# Patient Record
Sex: FEMALE | Race: WHITE | NOT HISPANIC OR LATINO | Employment: OTHER | URBAN - METROPOLITAN AREA
[De-identification: names, ages, dates, MRNs, and addresses within clinical notes are randomized per-mention and may not be internally consistent; named-entity substitution may affect disease eponyms.]

---

## 2017-01-13 ENCOUNTER — HOSPITAL ENCOUNTER (OUTPATIENT)
Dept: PHYSICAL THERAPY | Facility: HOSPITAL | Age: 67
Setting detail: RECURRING SERIES
Discharge: STILL ADMITTED OR ADMINISTRATIVE | End: 2017-01-31
Attending: ORTHOPAEDIC SURGERY | Admitting: ORTHOPAEDIC SURGERY

## 2017-02-01 ENCOUNTER — HOSPITAL ENCOUNTER (OUTPATIENT)
Dept: PHYSICAL THERAPY | Facility: HOSPITAL | Age: 67
Setting detail: RECURRING SERIES
Discharge: HOME | End: 2017-03-30
Attending: ORTHOPAEDIC SURGERY | Admitting: ORTHOPAEDIC SURGERY

## 2018-05-17 ENCOUNTER — OFFICE VISIT (OUTPATIENT)
Dept: CARDIOLOGY | Facility: CLINIC | Age: 68
End: 2018-05-17

## 2018-05-17 VITALS
HEART RATE: 77 BPM | SYSTOLIC BLOOD PRESSURE: 134 MMHG | BODY MASS INDEX: 26.12 KG/M2 | WEIGHT: 153 LBS | DIASTOLIC BLOOD PRESSURE: 81 MMHG | HEIGHT: 64 IN

## 2018-05-17 DIAGNOSIS — R94.31 ABNORMAL EKG: Primary | ICD-10-CM

## 2018-05-17 DIAGNOSIS — R06.02 SOB (SHORTNESS OF BREATH): ICD-10-CM

## 2018-05-17 PROCEDURE — 93000 ELECTROCARDIOGRAM COMPLETE: CPT | Performed by: INTERNAL MEDICINE

## 2018-05-17 PROCEDURE — 99203 OFFICE O/P NEW LOW 30 MIN: CPT | Performed by: INTERNAL MEDICINE

## 2018-05-17 RX ORDER — ROSUVASTATIN CALCIUM 10 MG/1
TABLET, COATED ORAL
COMMUNITY
Start: 2018-04-13

## 2018-05-17 NOTE — PROGRESS NOTES
" Subjective:        CC  Clearance for shoulder surg    Abnormal ekg    No, cp    Sob outside in heat     Yaneli Hernandez is a 68 y.o. female who Is here for the cardiac evaluation because of the abnormal EKG found as a preop for the clearance for the shoulder surgery has been complaining of shortness of breath went outside in the heat mild-to-moderate in intensity    Abnormal EKG was found as a preop evaluation by the primary-care physician recently for the clearance for the shoulder surgery     Cardiac risk factors: advanced age (older than 55 for men, 65 for women) and family history of premature cardiovascular disease.    Past Medical History:   Diagnosis Date   • Anxiety    • Depression    • Elevated cholesterol    • Headache     reports that she has never smoked. She has never used smokeless tobacco. She reports that she does not drink alcohol or use drugs.  Family History   Problem Relation Age of Onset   • Heart failure Mother         Review of Systems  Constitutional: No wt loss, fever   Gastrointestinal: No nausea , abdominal pain  Behavioral/Psych: No insomnia or anxiety   Cardiovascular ----positive for Shortness of breath no chest pain. All other systems reviewed and are negative    Objective:       Physical Exam           Physical Exam  /81 (BP Location: Left arm, Patient Position: Sitting)   Pulse 77   Ht 162.6 cm (64\")   Wt 69.4 kg (153 lb)   BMI 26.26 kg/m²     General appearance: NAD, conversant   Eyes: anicteric sclerae, moist conjunctivae; no lid-lag; PERRLA   HENT: Atraumatic; oropharynx clear with moist mucous membranes and no mucosal ulcerations;  normal hard and soft palate   Neck: Trachea midline; FROM, supple, no thyromegaly or lymphadenopathy   Lungs: CTA, with normal respiratory effort and no intercostal retractions   CV: S1-S2 regular, no murmurs, no rub, no gallop   Abdomen: Soft, non-tender; no masses or HSM   Extremities: No peripheral edema or extremity " lymphadenopathy  Skin: Normal temperature, turgor and texture; no rash, ulcers or subcutaneous nodules   Psych: Appropriate affect, alert and oriented to person, place and time           Cardiographics  @  ECG 12 Lead  Date/Time: 5/17/2018 1:01 PM  Performed by: JENNA NAM  Authorized by: JENNA NAM   Comparison: compared with previous ECG   Similar to previous ECG  Rhythm: sinus rhythm  ST Flattening: all  Clinical impression: non-specific ECG            Echocardiogram:     Imaging  Chest x-ray: not done     Lab Review   not applicable       Current Outpatient Prescriptions:   •  ALPRAZolam (XANAX) 2 MG tablet, Take 2 mg by mouth 3 (three) times a day as needed., Disp: , Rfl:   •  lidocaine (LIDODERM) 5 %, Place 1 patch on the skin every 12 (twelve) hours. Remove & Discard patch within 12 hours or as directed by MD, Disp: , Rfl:   •  rosuvastatin (CRESTOR) 10 MG tablet, , Disp: , Rfl:         Assessment:      1. Abnormal EKG    2. SOB (shortness of breath)        There is no problem list on file for this patient.        Plan:          1. Abnormal EKG  Considering the patient's symptoms as well as clinical situation and  EKG findings, along with cardiac risk factors, ischemic workup is necessary to rule out ischemic cardiomyopathy, stress induced arrhythmias, and functional capacity for diagnosis as well as prognostic consideration    - Treadmill Stress Test  - Adult Transthoracic Echo Complete W/ Cont if Necessary Per Protocol    2. SOB (shortness of breath)  Considering patient's medical condition as well as the risk factors, patient will require echocardiogram for further evaluation for the LV function, four-chamber evaluation, including the pressures, valvular function and  pericardial disease and pericardial effusion    - Treadmill Stress Test  - Adult Transthoracic Echo Complete W/ Cont if Necessary Per Protocol    Ett, echo for shoulder surgery          Counseling was given to Yaneli YU  Mary for the following topics:  risk factor reductions, prognosis and risks and benefits of treatment options .       SMOKING COUNSELING:    Counseling given: Not Answered      .  EMR Dragon/Transcription disclaimer:   Much of this encounter note is an electronic transcription/translation of spoken language to printed text. The electronic translation of spoken language may permit erroneous, or at times, nonsensical words or phrases to be inadvertently transcribed; Although I have reviewed the note for such errors, some may still exist.

## 2018-05-21 ENCOUNTER — HOSPITAL ENCOUNTER (OUTPATIENT)
Dept: CARDIOLOGY | Facility: HOSPITAL | Age: 68
Discharge: HOME | End: 2018-05-21
Attending: INTERNAL MEDICINE

## 2018-05-21 ENCOUNTER — HOSPITAL ENCOUNTER (OUTPATIENT)
Dept: CARDIOLOGY | Facility: HOSPITAL | Age: 68
Discharge: HOME | End: 2018-05-21
Attending: INTERNAL MEDICINE | Admitting: INTERNAL MEDICINE

## 2018-05-21 VITALS
DIASTOLIC BLOOD PRESSURE: 72 MMHG | HEART RATE: 80 BPM | HEIGHT: 64 IN | WEIGHT: 153 LBS | SYSTOLIC BLOOD PRESSURE: 142 MMHG | BODY MASS INDEX: 26.12 KG/M2

## 2018-05-21 VITALS
DIASTOLIC BLOOD PRESSURE: 68 MMHG | HEIGHT: 64 IN | SYSTOLIC BLOOD PRESSURE: 139 MMHG | BODY MASS INDEX: 26.12 KG/M2 | WEIGHT: 153 LBS

## 2018-05-21 LAB
BH CV STRESS BP STAGE 1: NORMAL
BH CV STRESS BP STAGE 2: NORMAL
BH CV STRESS DURATION MIN STAGE 1: 3
BH CV STRESS DURATION MIN STAGE 2: 1
BH CV STRESS DURATION SEC STAGE 1: 0
BH CV STRESS DURATION SEC STAGE 2: 0
BH CV STRESS GRADE STAGE 1: 10
BH CV STRESS GRADE STAGE 2: 12
BH CV STRESS HR STAGE 1: 128
BH CV STRESS HR STAGE 2: 140
BH CV STRESS METS STAGE 1: 4.6
BH CV STRESS METS STAGE 2: 5
BH CV STRESS PROTOCOL 1: NORMAL
BH CV STRESS RECOVERY BP: NORMAL MMHG
BH CV STRESS RECOVERY HR: 98 BPM
BH CV STRESS SPEED STAGE 1: 1.7
BH CV STRESS SPEED STAGE 2: 2.3
BH CV STRESS STAGE 1: 1
BH CV STRESS STAGE 2: 2
MAXIMAL PREDICTED HEART RATE: 153 BPM
PERCENT MAX PREDICTED HR: 91.5 %
STRESS BASELINE BP: NORMAL MMHG
STRESS BASELINE HR: 79 BPM
STRESS PERCENT HR: 108 %
STRESS POST ESTIMATED WORKLOAD: 5.1 METS
STRESS POST EXERCISE DUR MIN: 4 MIN
STRESS POST EXERCISE DUR SEC: 0 SEC
STRESS POST PEAK BP: NORMAL MMHG
STRESS POST PEAK HR: 140 BPM
STRESS TARGET HR: 130 BPM

## 2018-05-21 PROCEDURE — 93016 CV STRESS TEST SUPVJ ONLY: CPT | Performed by: INTERNAL MEDICINE

## 2018-05-21 PROCEDURE — 93306 TTE W/DOPPLER COMPLETE: CPT

## 2018-05-21 PROCEDURE — 93306 TTE W/DOPPLER COMPLETE: CPT | Performed by: INTERNAL MEDICINE

## 2018-05-21 PROCEDURE — 93017 CV STRESS TEST TRACING ONLY: CPT

## 2018-05-21 PROCEDURE — 93018 CV STRESS TEST I&R ONLY: CPT | Performed by: INTERNAL MEDICINE

## 2018-05-22 LAB
ASCENDING AORTA: 2.3 CM
BH CV ECHO MEAS - ACS: 1.7 CM
BH CV ECHO MEAS - AO MAX PG (FULL): 2.4 MMHG
BH CV ECHO MEAS - AO MAX PG: 6 MMHG
BH CV ECHO MEAS - AO MEAN PG (FULL): 1 MMHG
BH CV ECHO MEAS - AO MEAN PG: 3 MMHG
BH CV ECHO MEAS - AO ROOT AREA (BSA CORRECTED): 1.3
BH CV ECHO MEAS - AO ROOT AREA: 3.8 CM^2
BH CV ECHO MEAS - AO ROOT DIAM: 2.2 CM
BH CV ECHO MEAS - AO V2 MAX: 122 CM/SEC
BH CV ECHO MEAS - AO V2 MEAN: 76.1 CM/SEC
BH CV ECHO MEAS - AO V2 VTI: 23.7 CM
BH CV ECHO MEAS - ASC AORTA: 2.3 CM
BH CV ECHO MEAS - AVA(I,A): 2 CM^2
BH CV ECHO MEAS - AVA(I,D): 2 CM^2
BH CV ECHO MEAS - AVA(V,A): 2 CM^2
BH CV ECHO MEAS - AVA(V,D): 2 CM^2
BH CV ECHO MEAS - BSA(HAYCOCK): 1.8 M^2
BH CV ECHO MEAS - BSA: 1.7 M^2
BH CV ECHO MEAS - BZI_BMI: 26.3 KILOGRAMS/M^2
BH CV ECHO MEAS - BZI_METRIC_HEIGHT: 162.6 CM
BH CV ECHO MEAS - BZI_METRIC_WEIGHT: 69.4 KG
BH CV ECHO MEAS - CONTRAST EF (2CH): 61.2 ML/M^2
BH CV ECHO MEAS - CONTRAST EF 4CH: 67.7 ML/M^2
BH CV ECHO MEAS - EDV(CUBED): 74.1 ML
BH CV ECHO MEAS - EDV(MOD-SP2): 67 ML
BH CV ECHO MEAS - EDV(MOD-SP4): 65 ML
BH CV ECHO MEAS - EDV(TEICH): 78.6 ML
BH CV ECHO MEAS - EF(CUBED): 63.6 %
BH CV ECHO MEAS - EF(MOD-BP): 65 %
BH CV ECHO MEAS - EF(MOD-SP2): 61.2 %
BH CV ECHO MEAS - EF(MOD-SP4): 67.7 %
BH CV ECHO MEAS - EF(TEICH): 55.5 %
BH CV ECHO MEAS - ESV(CUBED): 27 ML
BH CV ECHO MEAS - ESV(MOD-SP2): 26 ML
BH CV ECHO MEAS - ESV(MOD-SP4): 21 ML
BH CV ECHO MEAS - ESV(TEICH): 35 ML
BH CV ECHO MEAS - FS: 28.6 %
BH CV ECHO MEAS - IVS/LVPW: 1
BH CV ECHO MEAS - IVSD: 0.8 CM
BH CV ECHO MEAS - LAT PEAK E' VEL: 7 CM/SEC
BH CV ECHO MEAS - LV DIASTOLIC VOL/BSA (35-75): 37.2 ML/M^2
BH CV ECHO MEAS - LV MASS(C)D: 101.3 GRAMS
BH CV ECHO MEAS - LV MASS(C)DI: 58 GRAMS/M^2
BH CV ECHO MEAS - LV MAX PG: 3.5 MMHG
BH CV ECHO MEAS - LV MEAN PG: 2 MMHG
BH CV ECHO MEAS - LV SYSTOLIC VOL/BSA (12-30): 12 ML/M^2
BH CV ECHO MEAS - LV V1 MAX: 94.2 CM/SEC
BH CV ECHO MEAS - LV V1 MEAN: 56.2 CM/SEC
BH CV ECHO MEAS - LV V1 VTI: 18.3 CM
BH CV ECHO MEAS - LVIDD: 4.2 CM
BH CV ECHO MEAS - LVIDS: 3 CM
BH CV ECHO MEAS - LVLD AP2: 7.2 CM
BH CV ECHO MEAS - LVLD AP4: 7.2 CM
BH CV ECHO MEAS - LVLS AP2: 6.3 CM
BH CV ECHO MEAS - LVLS AP4: 6.1 CM
BH CV ECHO MEAS - LVOT AREA (M): 2.5 CM^2
BH CV ECHO MEAS - LVOT AREA: 2.5 CM^2
BH CV ECHO MEAS - LVOT DIAM: 1.8 CM
BH CV ECHO MEAS - LVPWD: 0.8 CM
BH CV ECHO MEAS - MED PEAK E' VEL: 6 CM/SEC
BH CV ECHO MEAS - MV A DUR: 0.14 SEC
BH CV ECHO MEAS - MV A MAX VEL: 78.3 CM/SEC
BH CV ECHO MEAS - MV DEC SLOPE: 276 CM/SEC^2
BH CV ECHO MEAS - MV DEC TIME: 0.29 SEC
BH CV ECHO MEAS - MV E MAX VEL: 54.5 CM/SEC
BH CV ECHO MEAS - MV E/A: 0.7
BH CV ECHO MEAS - MV MAX PG: 2.8 MMHG
BH CV ECHO MEAS - MV MEAN PG: 1 MMHG
BH CV ECHO MEAS - MV P1/2T MAX VEL: 68.6 CM/SEC
BH CV ECHO MEAS - MV P1/2T: 72.8 MSEC
BH CV ECHO MEAS - MV V2 MAX: 83.6 CM/SEC
BH CV ECHO MEAS - MV V2 MEAN: 49.9 CM/SEC
BH CV ECHO MEAS - MV V2 VTI: 19 CM
BH CV ECHO MEAS - MVA P1/2T LCG: 3.2 CM^2
BH CV ECHO MEAS - MVA(P1/2T): 3 CM^2
BH CV ECHO MEAS - MVA(VTI): 2.5 CM^2
BH CV ECHO MEAS - PA ACC TIME: 0.1 SEC
BH CV ECHO MEAS - PA MAX PG (FULL): 1.8 MMHG
BH CV ECHO MEAS - PA MAX PG: 3.3 MMHG
BH CV ECHO MEAS - PA PR(ACCEL): 34 MMHG
BH CV ECHO MEAS - PA V2 MAX: 90.9 CM/SEC
BH CV ECHO MEAS - PULM A REVS DUR: 0.13 SEC
BH CV ECHO MEAS - PULM A REVS VEL: 37.1 CM/SEC
BH CV ECHO MEAS - PULM DIAS VEL: 38.8 CM/SEC
BH CV ECHO MEAS - PULM S/D: 1.6
BH CV ECHO MEAS - PULM SYS VEL: 61.1 CM/SEC
BH CV ECHO MEAS - PVA(V,A): 1.9 CM^2
BH CV ECHO MEAS - PVA(V,D): 1.9 CM^2
BH CV ECHO MEAS - QP/QS: 0.76
BH CV ECHO MEAS - RAP SYSTOLE: 3 MMHG
BH CV ECHO MEAS - RV MAX PG: 1.5 MMHG
BH CV ECHO MEAS - RV MEAN PG: 1 MMHG
BH CV ECHO MEAS - RV V1 MAX: 61.5 CM/SEC
BH CV ECHO MEAS - RV V1 MEAN: 39.4 CM/SEC
BH CV ECHO MEAS - RV V1 VTI: 12.5 CM
BH CV ECHO MEAS - RVOT AREA: 2.8 CM^2
BH CV ECHO MEAS - RVOT DIAM: 1.9 CM
BH CV ECHO MEAS - RVSP: 19.6 MMHG
BH CV ECHO MEAS - SI(AO): 51.6 ML/M^2
BH CV ECHO MEAS - SI(CUBED): 27 ML/M^2
BH CV ECHO MEAS - SI(LVOT): 26.7 ML/M^2
BH CV ECHO MEAS - SI(MOD-SP2): 23.5 ML/M^2
BH CV ECHO MEAS - SI(MOD-SP4): 25.2 ML/M^2
BH CV ECHO MEAS - SI(TEICH): 25 ML/M^2
BH CV ECHO MEAS - SUP REN AO DIAM: 1.9 CM
BH CV ECHO MEAS - SV(AO): 90.1 ML
BH CV ECHO MEAS - SV(CUBED): 47.1 ML
BH CV ECHO MEAS - SV(LVOT): 46.6 ML
BH CV ECHO MEAS - SV(MOD-SP2): 41 ML
BH CV ECHO MEAS - SV(MOD-SP4): 44 ML
BH CV ECHO MEAS - SV(RVOT): 35.4 ML
BH CV ECHO MEAS - SV(TEICH): 43.6 ML
BH CV ECHO MEAS - TAPSE (>1.6): 2 CM2
BH CV ECHO MEAS - TR MAX VEL: 204 CM/SEC
BH CV ECHO MEASUREMENTS AVERAGE E/E' RATIO: 8.38
BH CV VAS BP RIGHT ARM: NORMAL MMHG
BH CV XLRA - RV BASE: 2.6 CM
BH CV XLRA - TDI S': 10 CM/SEC
LEFT ATRIUM VOLUME INDEX: 15 ML/M2
MAXIMAL PREDICTED HEART RATE: 153 BPM
STRESS TARGET HR: 130 BPM

## 2021-02-24 ENCOUNTER — TRANSCRIBE ORDERS (OUTPATIENT)
Dept: ADMINISTRATIVE | Facility: HOSPITAL | Age: 71
End: 2021-02-24

## 2021-02-24 ENCOUNTER — LAB (OUTPATIENT)
Dept: LAB | Facility: HOSPITAL | Age: 71
End: 2021-02-24

## 2021-02-24 DIAGNOSIS — I13.10 MALIGNANT HYPERTENSIVE HEART AND CHRONIC KIDNEY DISEASE STAGE III (HCC): ICD-10-CM

## 2021-02-24 DIAGNOSIS — D63.1 ANEMIA IN END-STAGE RENAL DISEASE (HCC): Primary | ICD-10-CM

## 2021-02-24 DIAGNOSIS — I10 ESSENTIAL HYPERTENSION, MALIGNANT: ICD-10-CM

## 2021-02-24 DIAGNOSIS — N18.6 ANEMIA IN END-STAGE RENAL DISEASE (HCC): ICD-10-CM

## 2021-02-24 DIAGNOSIS — E55.9 VITAMIN D DEFICIENCY DISEASE: ICD-10-CM

## 2021-02-24 DIAGNOSIS — N18.30 MALIGNANT HYPERTENSIVE HEART AND CHRONIC KIDNEY DISEASE STAGE III (HCC): ICD-10-CM

## 2021-02-24 DIAGNOSIS — N18.6 ANEMIA IN END-STAGE RENAL DISEASE (HCC): Primary | ICD-10-CM

## 2021-02-24 DIAGNOSIS — N25.81 SECONDARY HYPERPARATHYROIDISM OF RENAL ORIGIN (HCC): ICD-10-CM

## 2021-02-24 DIAGNOSIS — D63.1 ANEMIA IN END-STAGE RENAL DISEASE (HCC): ICD-10-CM

## 2021-02-24 LAB
25(OH)D3 SERPL-MCNC: 82.6 NG/ML (ref 30–100)
ANION GAP SERPL CALCULATED.3IONS-SCNC: 12.9 MMOL/L (ref 5–15)
BACTERIA UR QL AUTO: ABNORMAL /HPF
BILIRUB UR QL STRIP: NEGATIVE
BUN SERPL-MCNC: 13 MG/DL (ref 8–23)
BUN/CREAT SERPL: 11.7 (ref 7–25)
CALCIUM SPEC-SCNC: 9.8 MG/DL (ref 8.6–10.5)
CHLORIDE SERPL-SCNC: 104 MMOL/L (ref 98–107)
CHOLEST SERPL-MCNC: 155 MG/DL (ref 0–200)
CLARITY UR: CLEAR
CO2 SERPL-SCNC: 28.1 MMOL/L (ref 22–29)
COD CRY URNS QL: ABNORMAL /HPF
COLOR UR: YELLOW
CREAT SERPL-MCNC: 1.11 MG/DL (ref 0.57–1)
CREAT UR-MCNC: 149.9 MG/DL
DEPRECATED RDW RBC AUTO: 42.2 FL (ref 37–54)
ERYTHROCYTE [DISTWIDTH] IN BLOOD BY AUTOMATED COUNT: 11.9 % (ref 12.3–15.4)
FERRITIN SERPL-MCNC: 219 NG/ML (ref 13–150)
GFR SERPL CREATININE-BSD FRML MDRD: 49 ML/MIN/1.73
GLUCOSE SERPL-MCNC: 106 MG/DL (ref 65–99)
GLUCOSE UR STRIP-MCNC: NEGATIVE MG/DL
HBA1C MFR BLD: 5.8 % (ref 3.5–5.6)
HCT VFR BLD AUTO: 46.4 % (ref 34–46.6)
HDLC SERPL-MCNC: 59 MG/DL (ref 40–60)
HGB BLD-MCNC: 15.8 G/DL (ref 12–15.9)
HGB UR QL STRIP.AUTO: NEGATIVE
HYALINE CASTS UR QL AUTO: ABNORMAL /LPF
IRON 24H UR-MRATE: 68 MCG/DL (ref 37–145)
IRON SATN MFR SERPL: 16 % (ref 20–50)
KETONES UR QL STRIP: NEGATIVE
LDLC SERPL CALC-MCNC: 76 MG/DL (ref 0–100)
LDLC/HDLC SERPL: 1.25 {RATIO}
LEUKOCYTE ESTERASE UR QL STRIP.AUTO: ABNORMAL
MCH RBC QN AUTO: 32.8 PG (ref 26.6–33)
MCHC RBC AUTO-ENTMCNC: 34.1 G/DL (ref 31.5–35.7)
MCV RBC AUTO: 96.3 FL (ref 79–97)
NITRITE UR QL STRIP: NEGATIVE
PH UR STRIP.AUTO: 5.5 [PH] (ref 5–8)
PHOSPHATE SERPL-MCNC: 4.2 MG/DL (ref 2.5–4.5)
PLATELET # BLD AUTO: 255 10*3/MM3 (ref 140–450)
PMV BLD AUTO: 12 FL (ref 6–12)
POTASSIUM SERPL-SCNC: 4 MMOL/L (ref 3.5–5.2)
PROT UR QL STRIP: NEGATIVE
PROT UR-MCNC: 9 MG/DL
PTH-INTACT SERPL-MCNC: 48.7 PG/ML (ref 15–65)
RBC # BLD AUTO: 4.82 10*6/MM3 (ref 3.77–5.28)
RBC # UR: ABNORMAL /HPF
REF LAB TEST METHOD: ABNORMAL
SODIUM SERPL-SCNC: 145 MMOL/L (ref 136–145)
SP GR UR STRIP: 1.02 (ref 1–1.03)
SQUAMOUS #/AREA URNS HPF: ABNORMAL /HPF
TIBC SERPL-MCNC: 431 MCG/DL (ref 298–536)
TRANSFERRIN SERPL-MCNC: 289 MG/DL (ref 200–360)
TRIGL SERPL-MCNC: 110 MG/DL (ref 0–150)
UROBILINOGEN UR QL STRIP: ABNORMAL
VLDLC SERPL-MCNC: 20 MG/DL (ref 5–40)
WBC # BLD AUTO: 8.6 10*3/MM3 (ref 3.4–10.8)
WBC UR QL AUTO: ABNORMAL /HPF

## 2021-02-24 PROCEDURE — 80048 BASIC METABOLIC PNL TOTAL CA: CPT

## 2021-02-24 PROCEDURE — 81001 URINALYSIS AUTO W/SCOPE: CPT

## 2021-02-24 PROCEDURE — 82306 VITAMIN D 25 HYDROXY: CPT

## 2021-02-24 PROCEDURE — 83883 ASSAY NEPHELOMETRY NOT SPEC: CPT

## 2021-02-24 PROCEDURE — 83036 HEMOGLOBIN GLYCOSYLATED A1C: CPT

## 2021-02-24 PROCEDURE — 36415 COLL VENOUS BLD VENIPUNCTURE: CPT

## 2021-02-24 PROCEDURE — 86335 IMMUNFIX E-PHORSIS/URINE/CSF: CPT

## 2021-02-24 PROCEDURE — 85027 COMPLETE CBC AUTOMATED: CPT

## 2021-02-24 PROCEDURE — 84100 ASSAY OF PHOSPHORUS: CPT

## 2021-02-24 PROCEDURE — 82570 ASSAY OF URINE CREATININE: CPT

## 2021-02-24 PROCEDURE — 82728 ASSAY OF FERRITIN: CPT

## 2021-02-24 PROCEDURE — 84466 ASSAY OF TRANSFERRIN: CPT

## 2021-02-24 PROCEDURE — 83540 ASSAY OF IRON: CPT

## 2021-02-24 PROCEDURE — 87086 URINE CULTURE/COLONY COUNT: CPT

## 2021-02-24 PROCEDURE — 83970 ASSAY OF PARATHORMONE: CPT

## 2021-02-24 PROCEDURE — 84155 ASSAY OF PROTEIN SERUM: CPT

## 2021-02-24 PROCEDURE — 80061 LIPID PANEL: CPT

## 2021-02-24 PROCEDURE — 84165 PROTEIN E-PHORESIS SERUM: CPT

## 2021-02-24 PROCEDURE — 84156 ASSAY OF PROTEIN URINE: CPT

## 2021-02-25 LAB
ALBUMIN SERPL ELPH-MCNC: 4.1 G/DL (ref 2.9–4.4)
ALBUMIN/GLOB SERPL: 1.4 {RATIO} (ref 0.7–1.7)
ALPHA1 GLOB SERPL ELPH-MCNC: 0.3 G/DL (ref 0–0.4)
ALPHA2 GLOB SERPL ELPH-MCNC: 0.8 G/DL (ref 0.4–1)
B-GLOBULIN SERPL ELPH-MCNC: 1.1 G/DL (ref 0.7–1.3)
BACTERIA SPEC AEROBE CULT: NO GROWTH
GAMMA GLOB SERPL ELPH-MCNC: 0.8 G/DL (ref 0.4–1.8)
GLOBULIN SER CALC-MCNC: 3 G/DL (ref 2.2–3.9)
KAPPA LC FREE SER-MCNC: 18.6 MG/L (ref 3.3–19.4)
KAPPA LC FREE/LAMBDA FREE SER: 1.81 {RATIO} (ref 0.26–1.65)
LABORATORY COMMENT REPORT: NORMAL
LAMBDA LC FREE SERPL-MCNC: 10.3 MG/L (ref 5.7–26.3)
M PROTEIN SERPL ELPH-MCNC: NORMAL G/DL
PROT PATTERN SERPL ELPH-IMP: NORMAL
PROT SERPL-MCNC: 7.1 G/DL (ref 6–8.5)

## 2021-02-26 LAB — INTERPRETATION UR IFE-IMP: NORMAL

## 2021-06-28 ENCOUNTER — LAB (OUTPATIENT)
Dept: LAB | Facility: HOSPITAL | Age: 71
End: 2021-06-28

## 2021-06-28 ENCOUNTER — TRANSCRIBE ORDERS (OUTPATIENT)
Dept: ADMINISTRATIVE | Facility: HOSPITAL | Age: 71
End: 2021-06-28

## 2021-06-28 DIAGNOSIS — I12.9 MALIGNANT HYPERTENSION WITH CHRONIC KIDNEY DISEASE STAGE III (HCC): ICD-10-CM

## 2021-06-28 DIAGNOSIS — N18.6 ANEMIA IN END-STAGE RENAL DISEASE (HCC): Primary | ICD-10-CM

## 2021-06-28 DIAGNOSIS — E55.9 VITAMIN D DEFICIENCY DISEASE: ICD-10-CM

## 2021-06-28 DIAGNOSIS — D63.1 ANEMIA IN END-STAGE RENAL DISEASE (HCC): Primary | ICD-10-CM

## 2021-06-28 DIAGNOSIS — N18.6 ANEMIA IN END-STAGE RENAL DISEASE (HCC): ICD-10-CM

## 2021-06-28 DIAGNOSIS — N25.81 SECONDARY HYPERPARATHYROIDISM OF RENAL ORIGIN (HCC): ICD-10-CM

## 2021-06-28 DIAGNOSIS — D63.1 ANEMIA IN END-STAGE RENAL DISEASE (HCC): ICD-10-CM

## 2021-06-28 DIAGNOSIS — N18.30 MALIGNANT HYPERTENSION WITH CHRONIC KIDNEY DISEASE STAGE III (HCC): ICD-10-CM

## 2021-06-28 DIAGNOSIS — I10 HYPERTENSION, UNSPECIFIED TYPE: ICD-10-CM

## 2021-06-28 LAB
25(OH)D3 SERPL-MCNC: 56.2 NG/ML
ANION GAP SERPL CALCULATED.3IONS-SCNC: 10.2 MMOL/L (ref 5–15)
BACTERIA UR QL AUTO: NORMAL /HPF
BILIRUB UR QL STRIP: NEGATIVE
BUN SERPL-MCNC: 19 MG/DL (ref 8–23)
BUN/CREAT SERPL: 19.6 (ref 7–25)
CALCIUM SPEC-SCNC: 10.8 MG/DL (ref 8.6–10.5)
CHLORIDE SERPL-SCNC: 105 MMOL/L (ref 98–107)
CLARITY UR: CLEAR
CO2 SERPL-SCNC: 27.8 MMOL/L (ref 22–29)
COLOR UR: YELLOW
CREAT SERPL-MCNC: 0.97 MG/DL (ref 0.57–1)
CREAT UR-MCNC: 17.9 MG/DL
DEPRECATED RDW RBC AUTO: 40.9 FL (ref 37–54)
ERYTHROCYTE [DISTWIDTH] IN BLOOD BY AUTOMATED COUNT: 12 % (ref 12.3–15.4)
FERRITIN SERPL-MCNC: 305 NG/ML (ref 13–150)
GFR SERPL CREATININE-BSD FRML MDRD: 57 ML/MIN/1.73
GLUCOSE SERPL-MCNC: 98 MG/DL (ref 65–99)
GLUCOSE UR STRIP-MCNC: NEGATIVE MG/DL
HCT VFR BLD AUTO: 44.3 % (ref 34–46.6)
HGB BLD-MCNC: 15.5 G/DL (ref 12–15.9)
HGB UR QL STRIP.AUTO: NEGATIVE
HYALINE CASTS UR QL AUTO: NORMAL /LPF
IRON 24H UR-MRATE: 63 MCG/DL (ref 37–145)
IRON SATN MFR SERPL: 15 % (ref 20–50)
KETONES UR QL STRIP: NEGATIVE
LEUKOCYTE ESTERASE UR QL STRIP.AUTO: ABNORMAL
MCH RBC QN AUTO: 32.9 PG (ref 26.6–33)
MCHC RBC AUTO-ENTMCNC: 35 G/DL (ref 31.5–35.7)
MCV RBC AUTO: 94.1 FL (ref 79–97)
NITRITE UR QL STRIP: NEGATIVE
PH UR STRIP.AUTO: 6.5 [PH] (ref 5–8)
PHOSPHATE SERPL-MCNC: 4 MG/DL (ref 2.5–4.5)
PLATELET # BLD AUTO: 269 10*3/MM3 (ref 140–450)
PMV BLD AUTO: 12.1 FL (ref 6–12)
POTASSIUM SERPL-SCNC: 4.9 MMOL/L (ref 3.5–5.2)
PROT UR QL STRIP: NEGATIVE
PROT UR-MCNC: <4 MG/DL
PROT/CREAT UR: NORMAL MG/G{CREAT}
PTH-INTACT SERPL-MCNC: 21.9 PG/ML (ref 15–65)
RBC # BLD AUTO: 4.71 10*6/MM3 (ref 3.77–5.28)
RBC # UR: NORMAL /HPF
REF LAB TEST METHOD: NORMAL
SODIUM SERPL-SCNC: 143 MMOL/L (ref 136–145)
SP GR UR STRIP: 1.01 (ref 1–1.03)
SQUAMOUS #/AREA URNS HPF: NORMAL /HPF
TIBC SERPL-MCNC: 423 MCG/DL (ref 298–536)
TRANSFERRIN SERPL-MCNC: 284 MG/DL (ref 200–360)
UROBILINOGEN UR QL STRIP: ABNORMAL
WBC # BLD AUTO: 7.46 10*3/MM3 (ref 3.4–10.8)
WBC UR QL AUTO: NORMAL /HPF

## 2021-06-28 PROCEDURE — 82570 ASSAY OF URINE CREATININE: CPT

## 2021-06-28 PROCEDURE — 81001 URINALYSIS AUTO W/SCOPE: CPT

## 2021-06-28 PROCEDURE — 82306 VITAMIN D 25 HYDROXY: CPT

## 2021-06-28 PROCEDURE — 84100 ASSAY OF PHOSPHORUS: CPT

## 2021-06-28 PROCEDURE — 84156 ASSAY OF PROTEIN URINE: CPT

## 2021-06-28 PROCEDURE — 83540 ASSAY OF IRON: CPT

## 2021-06-28 PROCEDURE — 36415 COLL VENOUS BLD VENIPUNCTURE: CPT

## 2021-06-28 PROCEDURE — 83970 ASSAY OF PARATHORMONE: CPT

## 2021-06-28 PROCEDURE — 85027 COMPLETE CBC AUTOMATED: CPT

## 2021-06-28 PROCEDURE — 80048 BASIC METABOLIC PNL TOTAL CA: CPT

## 2021-06-28 PROCEDURE — 82728 ASSAY OF FERRITIN: CPT

## 2021-06-28 PROCEDURE — 84466 ASSAY OF TRANSFERRIN: CPT

## 2021-09-21 ENCOUNTER — LAB (OUTPATIENT)
Dept: LAB | Facility: HOSPITAL | Age: 71
End: 2021-09-21

## 2021-09-21 ENCOUNTER — TRANSCRIBE ORDERS (OUTPATIENT)
Dept: ADMINISTRATIVE | Facility: HOSPITAL | Age: 71
End: 2021-09-21

## 2021-09-21 DIAGNOSIS — N18.9 ANEMIA DUE TO CHRONIC KIDNEY DISEASE, UNSPECIFIED CKD STAGE: ICD-10-CM

## 2021-09-21 DIAGNOSIS — N18.30: ICD-10-CM

## 2021-09-21 DIAGNOSIS — N25.81 SECONDARY HYPERPARATHYROIDISM (HCC): ICD-10-CM

## 2021-09-21 DIAGNOSIS — D63.1 ANEMIA DUE TO CHRONIC KIDNEY DISEASE, UNSPECIFIED CKD STAGE: ICD-10-CM

## 2021-09-21 DIAGNOSIS — E55.9 VITAMIN D DEFICIENCY: ICD-10-CM

## 2021-09-21 DIAGNOSIS — D63.1 ANEMIA DUE TO CHRONIC KIDNEY DISEASE, UNSPECIFIED CKD STAGE: Primary | ICD-10-CM

## 2021-09-21 DIAGNOSIS — N18.9 ANEMIA DUE TO CHRONIC KIDNEY DISEASE, UNSPECIFIED CKD STAGE: Primary | ICD-10-CM

## 2021-09-21 DIAGNOSIS — I12.9: ICD-10-CM

## 2021-09-21 LAB
25(OH)D3 SERPL-MCNC: 58.5 NG/ML
ANION GAP SERPL CALCULATED.3IONS-SCNC: 11 MMOL/L (ref 5–15)
BACTERIA UR QL AUTO: ABNORMAL /HPF
BILIRUB UR QL STRIP: NEGATIVE
BUN SERPL-MCNC: 9 MG/DL (ref 8–23)
BUN/CREAT SERPL: 6.3 (ref 7–25)
CALCIUM SPEC-SCNC: 10.6 MG/DL (ref 8.6–10.5)
CHLORIDE SERPL-SCNC: 105 MMOL/L (ref 98–107)
CLARITY UR: CLEAR
CO2 SERPL-SCNC: 27 MMOL/L (ref 22–29)
COLOR UR: YELLOW
CREAT SERPL-MCNC: 1.44 MG/DL (ref 0.57–1)
CREAT UR-MCNC: 78.1 MG/DL
DEPRECATED RDW RBC AUTO: 41.7 FL (ref 37–54)
ERYTHROCYTE [DISTWIDTH] IN BLOOD BY AUTOMATED COUNT: 11.9 % (ref 12.3–15.4)
FERRITIN SERPL-MCNC: 304 NG/ML (ref 13–150)
GFR SERPL CREATININE-BSD FRML MDRD: 36 ML/MIN/1.73
GLUCOSE SERPL-MCNC: 119 MG/DL (ref 65–99)
GLUCOSE UR STRIP-MCNC: NEGATIVE MG/DL
HCT VFR BLD AUTO: 47.9 % (ref 34–46.6)
HGB BLD-MCNC: 16.1 G/DL (ref 12–15.9)
HGB UR QL STRIP.AUTO: NEGATIVE
HYALINE CASTS UR QL AUTO: ABNORMAL /LPF
IRON 24H UR-MRATE: 73 MCG/DL (ref 37–145)
IRON SATN MFR SERPL: 17 % (ref 20–50)
KETONES UR QL STRIP: NEGATIVE
LEUKOCYTE ESTERASE UR QL STRIP.AUTO: ABNORMAL
MCH RBC QN AUTO: 32.1 PG (ref 26.6–33)
MCHC RBC AUTO-ENTMCNC: 33.6 G/DL (ref 31.5–35.7)
MCV RBC AUTO: 95.6 FL (ref 79–97)
NITRITE UR QL STRIP: NEGATIVE
PH UR STRIP.AUTO: 6 [PH] (ref 5–8)
PHOSPHATE SERPL-MCNC: 3.6 MG/DL (ref 2.5–4.5)
PLATELET # BLD AUTO: 267 10*3/MM3 (ref 140–450)
PMV BLD AUTO: 12.1 FL (ref 6–12)
POTASSIUM SERPL-SCNC: 5.4 MMOL/L (ref 3.5–5.2)
PROT UR QL STRIP: NEGATIVE
PROT UR-MCNC: 5 MG/DL
PROT/CREAT UR: 64 MG/G CREA (ref 0–200)
RBC # BLD AUTO: 5.01 10*6/MM3 (ref 3.77–5.28)
RBC # UR: ABNORMAL /HPF
REF LAB TEST METHOD: ABNORMAL
SODIUM SERPL-SCNC: 143 MMOL/L (ref 136–145)
SP GR UR STRIP: 1.01 (ref 1–1.03)
SQUAMOUS #/AREA URNS HPF: ABNORMAL /HPF
TIBC SERPL-MCNC: 426 MCG/DL (ref 298–536)
TRANSFERRIN SERPL-MCNC: 286 MG/DL (ref 200–360)
UROBILINOGEN UR QL STRIP: ABNORMAL
WBC # BLD AUTO: 7.78 10*3/MM3 (ref 3.4–10.8)
WBC UR QL AUTO: ABNORMAL /HPF

## 2021-09-21 PROCEDURE — 81001 URINALYSIS AUTO W/SCOPE: CPT

## 2021-09-21 PROCEDURE — 84100 ASSAY OF PHOSPHORUS: CPT

## 2021-09-21 PROCEDURE — 83540 ASSAY OF IRON: CPT

## 2021-09-21 PROCEDURE — 82728 ASSAY OF FERRITIN: CPT

## 2021-09-21 PROCEDURE — 85027 COMPLETE CBC AUTOMATED: CPT

## 2021-09-21 PROCEDURE — 36415 COLL VENOUS BLD VENIPUNCTURE: CPT

## 2021-09-21 PROCEDURE — 82306 VITAMIN D 25 HYDROXY: CPT

## 2021-09-21 PROCEDURE — 82570 ASSAY OF URINE CREATININE: CPT

## 2021-09-21 PROCEDURE — 80048 BASIC METABOLIC PNL TOTAL CA: CPT

## 2021-09-21 PROCEDURE — 84156 ASSAY OF PROTEIN URINE: CPT

## 2021-09-21 PROCEDURE — 84466 ASSAY OF TRANSFERRIN: CPT

## 2022-10-20 ENCOUNTER — OFFICE VISIT (OUTPATIENT)
Dept: CARDIOLOGY | Facility: CLINIC | Age: 72
End: 2022-10-20

## 2022-10-20 VITALS
RESPIRATION RATE: 18 BRPM | OXYGEN SATURATION: 97 % | HEART RATE: 74 BPM | DIASTOLIC BLOOD PRESSURE: 74 MMHG | SYSTOLIC BLOOD PRESSURE: 123 MMHG

## 2022-10-20 DIAGNOSIS — R07.9 CHEST PAIN, UNSPECIFIED TYPE: ICD-10-CM

## 2022-10-20 DIAGNOSIS — R55 SYNCOPE AND COLLAPSE: ICD-10-CM

## 2022-10-20 DIAGNOSIS — R00.2 PALPITATIONS: ICD-10-CM

## 2022-10-20 DIAGNOSIS — N18.31 CHRONIC KIDNEY DISEASE, STAGE 3A: Primary | ICD-10-CM

## 2022-10-20 DIAGNOSIS — E78.2 MIXED HYPERLIPIDEMIA: ICD-10-CM

## 2022-10-20 PROBLEM — E78.5 HYPERLIPIDEMIA: Status: ACTIVE | Noted: 2022-10-20

## 2022-10-20 PROCEDURE — 99203 OFFICE O/P NEW LOW 30 MIN: CPT | Performed by: INTERNAL MEDICINE

## 2022-10-20 PROCEDURE — 93000 ELECTROCARDIOGRAM COMPLETE: CPT | Performed by: INTERNAL MEDICINE

## 2022-10-20 RX ORDER — FUROSEMIDE 20 MG/1
TABLET ORAL
COMMUNITY
Start: 2022-10-07 | End: 2022-10-20

## 2022-10-20 RX ORDER — OXYBUTYNIN CHLORIDE 15 MG/1
TABLET, EXTENDED RELEASE ORAL
COMMUNITY
Start: 2022-10-05

## 2022-10-20 RX ORDER — ESCITALOPRAM OXALATE 10 MG/1
TABLET ORAL
COMMUNITY
Start: 2022-10-11

## 2022-10-20 NOTE — PROGRESS NOTES
Cardiology Office Visit      Encounter Date:  10/20/2022    Patient ID:   Yaneli Hernandez is a 72 y.o. female.    Reason For Followup:  Syncope presyncope    Brief Clinical History:  Dear Mina Florence PA    I had the pleasure of seeing Yaneli Hernandez today. As you are well aware, this is a 72 y.o. female past medical history of no prior coronary artery disease having multiple episodes of syncope presyncope loss of balance dizziness    Interval History:  No prior cardiac history  Complaining of multiple episodes of dizziness near loss of consciousness  Complaining of some palpitations  Intermittent nonspecific chest discomfort  No prior history of head injury  No orthopnea no PND no weight gain      Assessment & Plan    Impressions:  Syncope and presyncope  Palpitations  Chest discomfort  Chronic kidney disease  Hyperlipidemia    Recommendations:  Extended Holter monitor for 4 weeks to rule out any cardiac arrhythmia  Myocardial perfusion study for further risk stratification  Echocardiogram to rule out underlying structural heart disease or any significant valve pathology  Continue close monitoring  Prior work-up and evaluation reviewed and discussed patient  Follow-up in office in 2 months    Objective:    Vitals:  Vitals:    10/20/22 1423   BP: 123/74   BP Location: Left arm   Patient Position: Sitting   Cuff Size: Large Adult   Pulse: 74   Resp: 18   SpO2: 97%       Physical Exam:    General: Alert, cooperative, no distress, appears stated age  Head:  Normocephalic, atraumatic, mucous membranes moist  Eyes:  Conjunctiva/corneas clear, EOM's intact     Neck:  Supple,  no adenopathy;      Lungs: Clear to auscultation bilaterally, no wheezes rhonchi rales are noted  Chest wall: No tenderness  Heart::  Regular rate and rhythm, S1 and S2 normal, no murmur, rub or gallop  Abdomen: Soft, non-tender, nondistended bowel sounds active  Extremities: No cyanosis, clubbing, or edema  Pulses: 2+ and symmetric  all extremities  Skin:  No rashes or lesions  Neuro/psych: A&O x3. CN II through XII are grossly intact with appropriate affect      Allergies:  Allergies   Allergen Reactions   • Topamax [Topiramate] Hives and Other (See Comments)     CAUSED KIDNEY STONES   • Codeine Hives and Itching       Medication Review:     Current Outpatient Medications:   •  ALPRAZolam (XANAX) 2 MG tablet, Take 2 mg by mouth 3 (three) times a day as needed., Disp: , Rfl:   •  escitalopram (LEXAPRO) 10 MG tablet, , Disp: , Rfl:   •  oxybutynin XL (DITROPAN XL) 15 MG 24 hr tablet, , Disp: , Rfl:   •  rosuvastatin (CRESTOR) 10 MG tablet, , Disp: , Rfl:     Family History:  Family History   Problem Relation Age of Onset   • Heart failure Mother        Past Medical History:  Past Medical History:   Diagnosis Date   • Anxiety    • Depression    • Elevated cholesterol    • Headache        Past surgical History:  Past Surgical History:   Procedure Laterality Date   • SHOULDER ARTHROSCOPY Left     ROTATOR CUFF REPAIR   • SHOULDER ARTHROSCOPY W/ ROTATOR CUFF REPAIR Right 8/24/2016    Procedure: RT SHOULDER MANIPULATION, RT SHOULDER ARTHROSCOPY WITH ROTATOR CUFF REPAIR, BANKART REPAIR  AND ACROMIOPLASTY ;  Surgeon: Francesco Wall MD;  Location: [facility] OR [facility] [place];  Service:        Social History:  Social History     Socioeconomic History   • Marital status:    Tobacco Use   • Smoking status: Never   • Smokeless tobacco: Never   Vaping Use   • Vaping Use: Never used   Substance and Sexual Activity   • Alcohol use: No   • Drug use: No   • Sexual activity: Yes       Review of Systems:  The following systems were reviewed as they relate to the cardiovascular system: Constitutional, Eyes, ENT, Cardiovascular, Respiratory, Gastrointestinal, Integumentary, Neurological, Psychiatric, Hematologic, Endocrine, Musculoskeletal, and Genitourinary. The pertinent cardiovascular findings are reported above with all other cardiovascular points within those systems  being negative.    Diagnostic Study Review:     Current Electrocardiogram:    ECG 12 Lead    Date/Time: 10/20/2022 4:08 PM  Performed by: Hilaria Fajardo MD  Authorized by: Hilaria Fajardo MD   Comparison: compared with previous ECG   Similar to previous ECG  Rhythm: sinus rhythm  Rate: normal  BPM: 92  Conduction: conduction normal  QRS axis: normal  Other findings: non-specific ST-T wave changes    Clinical impression: abnormal EKG              NOTE: The following portions of the patient's history were reviewed and updated this visit as appropriate: allergies, current medications, past family history, past medical history, past social history, past surgical history and problem list.

## 2022-11-14 ENCOUNTER — APPOINTMENT (OUTPATIENT)
Dept: CARDIOLOGY | Facility: HOSPITAL | Age: 72
End: 2022-11-14

## 2022-11-14 ENCOUNTER — HOSPITAL ENCOUNTER (OUTPATIENT)
Dept: CARDIOLOGY | Facility: HOSPITAL | Age: 72
End: 2022-11-14

## 2022-12-14 ENCOUNTER — HOSPITAL ENCOUNTER (OUTPATIENT)
Dept: NUCLEAR MEDICINE | Facility: HOSPITAL | Age: 72
Discharge: HOME | End: 2022-12-14

## 2022-12-14 ENCOUNTER — APPOINTMENT (OUTPATIENT)
Dept: RESPIRATORY THERAPY | Facility: HOSPITAL | Age: 72
End: 2022-12-14

## 2022-12-14 ENCOUNTER — APPOINTMENT (OUTPATIENT)
Dept: CARDIOLOGY | Facility: HOSPITAL | Age: 72
End: 2022-12-14
Payer: MEDICARE

## 2022-12-14 ENCOUNTER — HOSPITAL ENCOUNTER (OUTPATIENT)
Dept: CARDIOLOGY | Facility: HOSPITAL | Age: 72
Discharge: HOME | End: 2022-12-14

## 2022-12-14 ENCOUNTER — APPOINTMENT (OUTPATIENT)
Dept: CARDIOLOGY | Facility: HOSPITAL | Age: 72
End: 2022-12-14

## 2022-12-14 ENCOUNTER — HOSPITAL ENCOUNTER (OUTPATIENT)
Dept: RESPIRATORY THERAPY | Facility: HOSPITAL | Age: 72
Discharge: HOME | End: 2022-12-14

## 2022-12-14 VITALS
BODY MASS INDEX: 26.12 KG/M2 | DIASTOLIC BLOOD PRESSURE: 55 MMHG | HEIGHT: 64 IN | SYSTOLIC BLOOD PRESSURE: 138 MMHG | HEART RATE: 70 BPM | WEIGHT: 153 LBS

## 2022-12-14 DIAGNOSIS — R00.2 PALPITATIONS: ICD-10-CM

## 2022-12-14 DIAGNOSIS — R07.9 CHEST PAIN, UNSPECIFIED TYPE: ICD-10-CM

## 2022-12-14 DIAGNOSIS — N18.31 CHRONIC KIDNEY DISEASE, STAGE 3A: ICD-10-CM

## 2022-12-14 DIAGNOSIS — R55 SYNCOPE AND COLLAPSE: ICD-10-CM

## 2022-12-14 LAB
ASCENDING AORTA: 2.6 CM
BH CV ECHO MEAS - ACS: 1.74 CM
BH CV ECHO MEAS - AI P1/2T: 1232 MSEC
BH CV ECHO MEAS - AO MAX PG: 8.1 MMHG
BH CV ECHO MEAS - AO MEAN PG: 4.3 MMHG
BH CV ECHO MEAS - AO ROOT DIAM: 2.6 CM
BH CV ECHO MEAS - AO V2 MAX: 142.1 CM/SEC
BH CV ECHO MEAS - AO V2 VTI: 33.1 CM
BH CV ECHO MEAS - AVA(I,D): 2.01 CM2
BH CV ECHO MEAS - EDV(CUBED): 114.1 ML
BH CV ECHO MEAS - EDV(MOD-SP2): 39.3 ML
BH CV ECHO MEAS - EDV(MOD-SP4): 40.5 ML
BH CV ECHO MEAS - EF(MOD-BP): 55 %
BH CV ECHO MEAS - EF(MOD-SP2): 66 %
BH CV ECHO MEAS - EF(MOD-SP4): 47.8 %
BH CV ECHO MEAS - ESV(CUBED): 48 ML
BH CV ECHO MEAS - ESV(MOD-SP2): 13.4 ML
BH CV ECHO MEAS - ESV(MOD-SP4): 21.1 ML
BH CV ECHO MEAS - FS: 25.1 %
BH CV ECHO MEAS - IVS/LVPW: 0.9 CM
BH CV ECHO MEAS - IVSD: 0.83 CM
BH CV ECHO MEAS - LA DIMENSION: 3.1 CM
BH CV ECHO MEAS - LV DIASTOLIC VOL/BSA (35-75): 23.2 CM2
BH CV ECHO MEAS - LV MASS(C)D: 144.5 GRAMS
BH CV ECHO MEAS - LV MAX PG: 3.4 MMHG
BH CV ECHO MEAS - LV MEAN PG: 2.13 MMHG
BH CV ECHO MEAS - LV SYSTOLIC VOL/BSA (12-30): 12.1 CM2
BH CV ECHO MEAS - LV V1 MAX: 91.5 CM/SEC
BH CV ECHO MEAS - LV V1 VTI: 21.7 CM
BH CV ECHO MEAS - LVIDD: 4.8 CM
BH CV ECHO MEAS - LVIDS: 3.6 CM
BH CV ECHO MEAS - LVOT AREA: 3.1 CM2
BH CV ECHO MEAS - LVOT DIAM: 1.97 CM
BH CV ECHO MEAS - LVPWD: 0.92 CM
BH CV ECHO MEAS - MR MAX PG: 79.8 MMHG
BH CV ECHO MEAS - MR MAX VEL: 446.6 CM/SEC
BH CV ECHO MEAS - MV A DUR: 0.15 SEC
BH CV ECHO MEAS - MV A MAX VEL: 89.6 CM/SEC
BH CV ECHO MEAS - MV DEC TIME: 0.18 MSEC
BH CV ECHO MEAS - MV E MAX VEL: 86.4 CM/SEC
BH CV ECHO MEAS - MV E/A: 0.96
BH CV ECHO MEAS - MV MAX PG: 3.5 MMHG
BH CV ECHO MEAS - MV MEAN PG: 1.61 MMHG
BH CV ECHO MEAS - MV V2 VTI: 29 CM
BH CV ECHO MEAS - MVA(VTI): 2.29 CM2
BH CV ECHO MEAS - PA V2 MAX: 89.1 CM/SEC
BH CV ECHO MEAS - PULM A REVS DUR: 0.13 SEC
BH CV ECHO MEAS - PULM A REVS VEL: 21.9 CM/SEC
BH CV ECHO MEAS - PULM DIAS VEL: 36.5 CM/SEC
BH CV ECHO MEAS - PULM S/D: 1.39
BH CV ECHO MEAS - PULM SYS VEL: 50.5 CM/SEC
BH CV ECHO MEAS - RAP SYSTOLE: 3 MMHG
BH CV ECHO MEAS - RV MAX PG: 1.4 MMHG
BH CV ECHO MEAS - RV V1 MAX: 59.2 CM/SEC
BH CV ECHO MEAS - RV V1 VTI: 11.9 CM
BH CV ECHO MEAS - RVSP: 24.9 MMHG
BH CV ECHO MEAS - SI(MOD-SP2): 14.9 ML/M2
BH CV ECHO MEAS - SI(MOD-SP4): 11.1 ML/M2
BH CV ECHO MEAS - SV(LVOT): 66.4 ML
BH CV ECHO MEAS - SV(MOD-SP2): 25.9 ML
BH CV ECHO MEAS - SV(MOD-SP4): 19.3 ML
BH CV ECHO MEAS - TAPSE (>1.6): 1.6 CM
BH CV ECHO MEAS - TR MAX PG: 21.9 MMHG
BH CV ECHO MEAS - TR MAX VEL: 233.6 CM/SEC
BH CV NUCLEAR PRIOR STUDY: 3
BH CV REST NUCLEAR ISOTOPE DOSE: 8.6 MCI
BH CV STRESS BP STAGE 1: NORMAL
BH CV STRESS BP STAGE 2: NORMAL
BH CV STRESS DURATION MIN STAGE 1: 3
BH CV STRESS DURATION MIN STAGE 2: 3
BH CV STRESS DURATION SEC STAGE 1: 0
BH CV STRESS DURATION SEC STAGE 2: 0
BH CV STRESS GRADE STAGE 1: 10
BH CV STRESS GRADE STAGE 2: 12
BH CV STRESS HR STAGE 1: 115
BH CV STRESS HR STAGE 2: 169
BH CV STRESS METS STAGE 1: 5
BH CV STRESS METS STAGE 2: 7.5
BH CV STRESS NUCLEAR ISOTOPE DOSE: 27 MCI
BH CV STRESS PROTOCOL 1: NORMAL
BH CV STRESS RECOVERY BP: NORMAL MMHG
BH CV STRESS RECOVERY HR: 87 BPM
BH CV STRESS SPEED STAGE 1: 1.7
BH CV STRESS SPEED STAGE 2: 2.5
BH CV STRESS STAGE 1: 1
BH CV STRESS STAGE 2: 2
LV EF 2D ECHO EST: 55 %
LV EF NUC BP: 65 %
MAXIMAL PREDICTED HEART RATE: 149 BPM
MAXIMAL PREDICTED HEART RATE: 149 BPM
PERCENT MAX PREDICTED HR: 113.42 %
SINUS: 2.2 CM
STJ: 1.9 CM
STRESS BASELINE BP: NORMAL MMHG
STRESS BASELINE HR: 73 BPM
STRESS PERCENT HR: 133 %
STRESS POST ESTIMATED WORKLOAD: 7 METS
STRESS POST EXERCISE DUR MIN: 4 MIN
STRESS POST EXERCISE DUR SEC: 37 SEC
STRESS POST PEAK BP: NORMAL MMHG
STRESS POST PEAK HR: 169 BPM
STRESS TARGET HR: 127 BPM
STRESS TARGET HR: 127 BPM

## 2022-12-14 PROCEDURE — 93018 CV STRESS TEST I&R ONLY: CPT | Performed by: INTERNAL MEDICINE

## 2022-12-14 PROCEDURE — 93306 TTE W/DOPPLER COMPLETE: CPT | Performed by: INTERNAL MEDICINE

## 2022-12-14 PROCEDURE — 0 TECHNETIUM TETROFOSMIN KIT: Performed by: INTERNAL MEDICINE

## 2022-12-14 PROCEDURE — A9502 TC99M TETROFOSMIN: HCPCS | Performed by: INTERNAL MEDICINE

## 2022-12-14 PROCEDURE — 93017 CV STRESS TEST TRACING ONLY: CPT

## 2022-12-14 PROCEDURE — 93306 TTE W/DOPPLER COMPLETE: CPT

## 2022-12-14 PROCEDURE — 78452 HT MUSCLE IMAGE SPECT MULT: CPT | Performed by: INTERNAL MEDICINE

## 2022-12-14 PROCEDURE — 78452 HT MUSCLE IMAGE SPECT MULT: CPT

## 2022-12-14 PROCEDURE — 93016 CV STRESS TEST SUPVJ ONLY: CPT | Performed by: NURSE PRACTITIONER

## 2022-12-14 RX ADMIN — TETROFOSMIN 1 DOSE: 1.38 INJECTION, POWDER, LYOPHILIZED, FOR SOLUTION INTRAVENOUS at 11:26

## 2022-12-14 RX ADMIN — TETROFOSMIN 1 DOSE: 1.38 INJECTION, POWDER, LYOPHILIZED, FOR SOLUTION INTRAVENOUS at 09:55

## 2022-12-15 ENCOUNTER — TELEPHONE (OUTPATIENT)
Dept: CARDIOLOGY | Facility: CLINIC | Age: 72
End: 2022-12-15

## 2022-12-15 NOTE — TELEPHONE ENCOUNTER
OK FOR THE HUB TO RELEASE    -----------------------------    Hilaria Fajardo MD Melissa, MA      Normal echocardiogram  Normal stress test

## 2023-01-12 ENCOUNTER — OFFICE VISIT (OUTPATIENT)
Dept: CARDIOLOGY | Facility: CLINIC | Age: 73
End: 2023-01-12
Payer: MEDICARE

## 2023-01-12 VITALS
RESPIRATION RATE: 18 BRPM | SYSTOLIC BLOOD PRESSURE: 148 MMHG | DIASTOLIC BLOOD PRESSURE: 82 MMHG | BODY MASS INDEX: 24.59 KG/M2 | WEIGHT: 144 LBS | OXYGEN SATURATION: 97 % | HEART RATE: 73 BPM | HEIGHT: 64 IN

## 2023-01-12 DIAGNOSIS — E78.2 MIXED HYPERLIPIDEMIA: ICD-10-CM

## 2023-01-12 DIAGNOSIS — N18.31 CHRONIC KIDNEY DISEASE, STAGE 3A: ICD-10-CM

## 2023-01-12 DIAGNOSIS — R55 SYNCOPE AND COLLAPSE: Primary | ICD-10-CM

## 2023-01-12 DIAGNOSIS — R00.2 PALPITATIONS: ICD-10-CM

## 2023-01-12 PROCEDURE — 93000 ELECTROCARDIOGRAM COMPLETE: CPT | Performed by: INTERNAL MEDICINE

## 2023-01-12 PROCEDURE — 99214 OFFICE O/P EST MOD 30 MIN: CPT | Performed by: INTERNAL MEDICINE

## 2023-01-12 NOTE — PROGRESS NOTES
"Cardiology Office Visit      Encounter Date:  01/12/2023    Patient ID:   Yaneli Hernandez is a 72 y.o. female.    Reason For Followup:  Syncope presyncope    Brief Clinical History:  Dear Mina Florence PA    I had the pleasure of seeing Yaneli Hernandez today. As you are well aware, this is a 72 y.o. female past medical history of no prior coronary artery disease having multiple episodes of syncope presyncope loss of balance dizziness    Interval History:  No prior cardiac history  Complaining of multiple episodes of dizziness near loss of consciousness  Complaining of some palpitations  No prior history of head injury  No orthopnea no PND no weight gain      Assessment & Plan    Impressions:  Syncope and presyncope  Palpitations  Chest discomfort  Chronic kidney disease  Hyperlipidemia  Normal echocardiogram with normal LV systolic function  Myocardial perfusion study with no significant reversible ischemia    Recommendations:  Extended Holter monitor for 4 weeks to rule out any cardiac arrhythmia  Results of the echocardiogram and stress test reviewed and discussed with patient  Obtain a copy of the labs from the primary care physician office  Continue close monitoring  Prior work-up and evaluation reviewed and discussed patient  Follow-up in office in 3 months  Objective:    Vitals:  Vitals:    01/12/23 1529   BP: 148/82   BP Location: Left arm   Patient Position: Sitting   Cuff Size: Large Adult   Pulse: 73   Resp: 18   SpO2: 97%   Weight: 65.3 kg (144 lb)   Height: 162.6 cm (64\")       Physical Exam:    General: Alert, cooperative, no distress, appears stated age  Head:  Normocephalic, atraumatic, mucous membranes moist  Eyes:  Conjunctiva/corneas clear, EOM's intact     Neck:  Supple,  no adenopathy;      Lungs: Clear to auscultation bilaterally, no wheezes rhonchi rales are noted  Chest wall: No tenderness  Heart::  Regular rate and rhythm, S1 and S2 normal, no murmur, rub or " gallop  Abdomen: Soft, non-tender, nondistended bowel sounds active  Extremities: No cyanosis, clubbing, or edema  Pulses: 2+ and symmetric all extremities  Skin:  No rashes or lesions  Neuro/psych: A&O x3. CN II through XII are grossly intact with appropriate affect      Allergies:  Allergies   Allergen Reactions   • Topamax [Topiramate] Hives and Other (See Comments)     CAUSED KIDNEY STONES   • Codeine Hives and Itching       Medication Review:     Current Outpatient Medications:   •  ALPRAZolam (XANAX) 2 MG tablet, Take 2 mg by mouth 3 (three) times a day as needed., Disp: , Rfl:   •  escitalopram (LEXAPRO) 10 MG tablet, , Disp: , Rfl:   •  oxybutynin XL (DITROPAN XL) 15 MG 24 hr tablet, , Disp: , Rfl:   •  rosuvastatin (CRESTOR) 10 MG tablet, , Disp: , Rfl:     Family History:  Family History   Problem Relation Age of Onset   • Heart failure Mother        Past Medical History:  Past Medical History:   Diagnosis Date   • Anxiety    • Depression    • Elevated cholesterol    • Headache        Past surgical History:  Past Surgical History:   Procedure Laterality Date   • SHOULDER ARTHROSCOPY Left     ROTATOR CUFF REPAIR   • SHOULDER ARTHROSCOPY W/ ROTATOR CUFF REPAIR Right 8/24/2016    Procedure: RT SHOULDER MANIPULATION, RT SHOULDER ARTHROSCOPY WITH ROTATOR CUFF REPAIR, BANKART REPAIR  AND ACROMIOPLASTY ;  Surgeon: Francesco Wall MD;  Location: [facility] OR [facility] [place];  Service:        Social History:  Social History     Socioeconomic History   • Marital status:    Tobacco Use   • Smoking status: Never   • Smokeless tobacco: Never   Vaping Use   • Vaping Use: Never used   Substance and Sexual Activity   • Alcohol use: No   • Drug use: No   • Sexual activity: Yes       Review of Systems:  The following systems were reviewed as they relate to the cardiovascular system: Constitutional, Eyes, ENT, Cardiovascular, Respiratory, Gastrointestinal, Integumentary, Neurological, Psychiatric, Hematologic, Endocrine,  Musculoskeletal, and Genitourinary. The pertinent cardiovascular findings are reported above with all other cardiovascular points within those systems being negative.    Diagnostic Study Review:     Current Electrocardiogram:    ECG 12 Lead    Date/Time: 1/12/2023 4:02 PM  Performed by: Hilaria Fajardo MD  Authorized by: Hilaria Fajardo MD   Comparison: compared with previous ECG   Similar to previous ECG  Rhythm: sinus rhythm  Rate: normal  BPM: 73  Conduction: conduction normal  QRS axis: normal  Other findings: non-specific ST-T wave changes    Clinical impression: abnormal EKG              NOTE: The following portions of the patient's history were reviewed and updated this visit as appropriate: allergies, current medications, past family history, past medical history, past social history, past surgical history and problem list.

## 2023-01-16 ENCOUNTER — HOSPITAL ENCOUNTER (OUTPATIENT)
Dept: CARDIOLOGY | Facility: HOSPITAL | Age: 73
Discharge: HOME | End: 2023-01-16
Payer: MEDICARE

## 2023-01-16 DIAGNOSIS — R55 SYNCOPE AND COLLAPSE: ICD-10-CM

## 2023-02-22 LAB
MAXIMAL PREDICTED HEART RATE: 149 BPM
STRESS TARGET HR: 127 BPM

## 2023-02-22 PROCEDURE — 93228 REMOTE 30 DAY ECG REV/REPORT: CPT | Performed by: INTERNAL MEDICINE

## 2023-02-24 ENCOUNTER — TELEPHONE (OUTPATIENT)
Dept: CARDIOLOGY | Facility: CLINIC | Age: 73
End: 2023-02-24
Payer: COMMERCIAL

## 2023-05-08 ENCOUNTER — HOSPITAL ENCOUNTER (EMERGENCY)
Facility: HOSPITAL | Age: 73
Discharge: HOME | End: 2023-05-08
Attending: EMERGENCY MEDICINE | Admitting: EMERGENCY MEDICINE
Payer: MEDICARE

## 2023-05-08 ENCOUNTER — APPOINTMENT (OUTPATIENT)
Dept: GENERAL RADIOLOGY | Facility: HOSPITAL | Age: 73
End: 2023-05-08
Payer: MEDICARE

## 2023-05-08 VITALS
RESPIRATION RATE: 18 BRPM | WEIGHT: 146.61 LBS | HEART RATE: 72 BPM | HEIGHT: 64 IN | OXYGEN SATURATION: 98 % | TEMPERATURE: 98.2 F | DIASTOLIC BLOOD PRESSURE: 82 MMHG | SYSTOLIC BLOOD PRESSURE: 138 MMHG | BODY MASS INDEX: 25.03 KG/M2

## 2023-05-08 DIAGNOSIS — M25.532 LEFT WRIST PAIN: Primary | ICD-10-CM

## 2023-05-08 PROCEDURE — 73110 X-RAY EXAM OF WRIST: CPT

## 2023-05-08 PROCEDURE — 99283 EMERGENCY DEPT VISIT LOW MDM: CPT

## 2023-05-08 NOTE — ED PROVIDER NOTES
Subjective   History of Present Illness  Patient is a 73-year-old female presents to the ED with complaints of left wrist pain for several weeks no known injury. Patient states the pain is all the way across to her wrist worse with movements better with rest.  Denies any numbness or weakness.  She states pain is nonradiating from her wrist.  No redness swelling or warmth.  No fever bodyaches or chills.  No prior surgeries to the area.  Rates her pain as moderate in severity.  Patient states she has taken Aleve at home with minimal improvement of her pain.    History provided by:  Patient      Review of Systems   Constitutional: Negative.    Respiratory: Negative.    Cardiovascular: Negative.    Musculoskeletal: Positive for arthralgias. Negative for joint swelling, neck pain and neck stiffness.   Skin: Negative.    Neurological: Negative.        Past Medical History:   Diagnosis Date   • Anxiety    • Depression    • Elevated cholesterol    • Headache        Allergies   Allergen Reactions   • Topamax [Topiramate] Hives and Other (See Comments)     CAUSED KIDNEY STONES   • Codeine Hives and Itching       Past Surgical History:   Procedure Laterality Date   • SHOULDER ARTHROSCOPY Left     ROTATOR CUFF REPAIR   • SHOULDER ARTHROSCOPY W/ ROTATOR CUFF REPAIR Right 8/24/2016    Procedure: RT SHOULDER MANIPULATION, RT SHOULDER ARTHROSCOPY WITH ROTATOR CUFF REPAIR, BANKART REPAIR  AND ACROMIOPLASTY ;  Surgeon: Francesco Wall MD;  Location: [facility] OR [facility] [place];  Service:        Family History   Problem Relation Age of Onset   • Heart failure Mother        Social History     Socioeconomic History   • Marital status:    Tobacco Use   • Smoking status: Never   • Smokeless tobacco: Never   Vaping Use   • Vaping Use: Never used   Substance and Sexual Activity   • Alcohol use: No   • Drug use: No   • Sexual activity: Yes           Objective   Physical Exam  Vitals and nursing note reviewed.   Constitutional:       General: She  is not in acute distress.     Appearance: She is well-developed. She is not ill-appearing, toxic-appearing or diaphoretic.   HENT:      Head: Normocephalic and atraumatic.      Mouth/Throat:      Mouth: Mucous membranes are moist.      Pharynx: Oropharynx is clear.   Eyes:      General: No scleral icterus.     Extraocular Movements: Extraocular movements intact.      Pupils: Pupils are equal, round, and reactive to light.   Cardiovascular:      Rate and Rhythm: Normal rate and regular rhythm.      Pulses: Normal pulses.      Heart sounds: No murmur heard.    No friction rub. No gallop.   Pulmonary:      Effort: Pulmonary effort is normal. No respiratory distress.      Breath sounds: Normal breath sounds. No stridor. No wheezing, rhonchi or rales.   Chest:      Chest wall: No tenderness.   Musculoskeletal:      Left elbow: No swelling. Normal range of motion. No tenderness.      Left forearm: Normal.      Right wrist: Normal.      Left wrist: Tenderness present. No swelling, deformity, effusion, lacerations or snuff box tenderness. Decreased range of motion. Normal pulse.      Left hand: Normal.      Comments: Peripheral pulses are intact compartments are soft of bilateral upper extremities.  Slight decreased range of motion with flexion-extension supination pronation of left wrist secondary to pain.  Tenderness along both the radial and ulnar aspect of the wrist without snuffbox tenderness.  Radial median ulnar nerve intact.  Normal sensation throughout.   strength equal bilaterally   Skin:     General: Skin is warm.      Capillary Refill: Capillary refill takes less than 2 seconds.      Coloration: Skin is not cyanotic, jaundiced or pale.      Findings: No rash.   Neurological:      General: No focal deficit present.      Mental Status: She is alert and oriented to person, place, and time.   Psychiatric:         Mood and Affect: Mood normal.         Behavior: Behavior normal.         Procedures           ED  "Course    /82   Pulse 72   Temp 98.2 °F (36.8 °C)   Resp 18   Ht 162.6 cm (64\")   Wt 66.5 kg (146 lb 9.7 oz)   SpO2 98%   BMI 25.16 kg/m²   Medications - No data to display  Labs Reviewed - No data to display  XR Wrist 3+ View Left    Result Date: 5/8/2023  Impression: Mild to moderate osteoarthritic change of the left wrist. No acute left wrist findings. Electronically Signed: Shala Martines  5/8/2023 4:08 PM EDT  Workstation ID: [id number]                                           Medical Decision Making      Comorbidity: As per past medical history  Differentials: Fracture, dislocation, contusion, sprain, strain     ;this list is not all inclusive and does not constitute the entirety of considered causes  Radiology: My interpretation of right wrist x-ray shows no obvious fracture correlated with radiologist interpretation as below  XR Wrist 3+ View Left   Final Result    Impression:    Mild to moderate osteoarthritic change of the left wrist. No acute left wrist findings.            Electronically Signed: Shala Martines      5/8/2023 4:08 PM EDT      Workstation ID: [id number]     Disposition/Treatment:  Appropriate PPE was worn during exam and throughout all encounters with the patient.  When the ED patient was afebrile and appeared nontoxic there are no signs of infection of the joint including cellulitis or septic arthritis.  There is no swelling redness or warmth.  Patient declined pain medicine while in the ED.  X-ray was obtained which showed no acute osseous abnormality.  Did show some arthritis.    Upon reassessment patient is resting quietly findings were discussed with the patient at bedside she was placed in a wrist splint and was distally neurovascular intact after placement with good capillary refill.  She will be is advised to follow-up with orthopedist if her pain continues.  Voiced understanding and discharge along with signs and symptoms to return.    This document is intended for medical " expert use only. Reading of this document by patients and/or patient's family without participating medical staff guidance may result in misinterpretation and unintended morbidity.  Any interpretation of such data is the responsibility of the patient and/or family member responsible for the patient in concert with their primary or specialist providers, not to be left for sources of online searches such as [employer], [employer] or similar queries. Relying on these approaches to knowledge may result in misinterpretation, misguided goals of care and even death should patients or family members try recommendations outside of the realm of professional medical care in a supervised inpatient environment.       Amount and/or Complexity of Data Reviewed  Radiology: ordered. Decision-making details documented in ED Course.          Final diagnoses:   Left wrist pain       ED Disposition  ED Disposition     ED Disposition   Discharge    Condition   Stable    Comment   --             Mina Padilla PA  [address] [facility] [address] [phone]    Schedule an appointment as soon as possible for a visit in 3 days      [facility] EMERGENCY DEPARTMENT  [facility] [facility] [address] [phone]  Go to   If symptoms worsen    Sreekanth Salazar MD  [facility] [place] [address] [phone]    Schedule an appointment as soon as possible for a visit            Medication List      No changes were made to your prescriptions during this visit.          Rekha Bear PA  05/08/23 3710

## 2023-05-08 NOTE — DISCHARGE INSTRUCTIONS
Wear wrist splint for the next 3 to 5 days as needed for pain.  You may apply ice and elevate your left wrist for 20 minutes at a time to help with pain.    Follow-up with your primary care provider in 3-5 days.  If you do not have a primary care provider call [phone] for help in finding one, or you may follow up with [facility] at [phone].    Return to ED for any any new or worsening symptoms    Over-the-counter pain medicine as needed

## 2023-09-04 ENCOUNTER — HOSPITAL ENCOUNTER (EMERGENCY)
Facility: HOSPITAL | Age: 73
Discharge: HOME | End: 2023-09-04
Attending: EMERGENCY MEDICINE | Admitting: EMERGENCY MEDICINE
Payer: MEDICARE

## 2023-09-04 VITALS
BODY MASS INDEX: 24.05 KG/M2 | TEMPERATURE: 97.4 F | DIASTOLIC BLOOD PRESSURE: 81 MMHG | WEIGHT: 140.87 LBS | SYSTOLIC BLOOD PRESSURE: 165 MMHG | HEIGHT: 64 IN | OXYGEN SATURATION: 95 % | RESPIRATION RATE: 18 BRPM | HEART RATE: 69 BPM
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
## 2023-09-04 PROCEDURE — 63710000001 PREDNISONE PER 5 MG: Performed by: NURSE PRACTITIONER

## 2023-09-04 PROCEDURE — 99283 EMERGENCY DEPT VISIT LOW MDM: CPT

## 2023-09-04 RX ORDER — HYDROXYZINE HYDROCHLORIDE 25 MG/1
50 TABLET, FILM COATED ORAL ONCE
Status: COMPLETED | OUTPATIENT
Start: 2023-09-04 | End: 2023-09-04

## 2023-09-04 RX ORDER — HYDROXYZINE HYDROCHLORIDE 25 MG/1
25 TABLET, FILM COATED ORAL 3 TIMES DAILY PRN
Qty: 12 TABLET | Refills: 0 | Status: SHIPPED | OUTPATIENT
Start: 2023-09-04

## 2023-09-04 RX ORDER — PREDNISONE 20 MG/1
20 TABLET ORAL DAILY
Qty: 5 TABLET | Refills: 0 | Status: SHIPPED | OUTPATIENT
Start: 2023-09-04

## 2023-09-04 RX ADMIN — HYDROXYZINE HYDROCHLORIDE 50 MG: 25 TABLET, FILM COATED ORAL at 20:47

## 2023-09-04 RX ADMIN — PREDNISONE 60 MG: 10 TABLET ORAL at 20:47

## 2023-09-05 NOTE — ED PROVIDER NOTES
Subjective   History of Present Illness  Patient is a 73-year-old male who states she recently went to a [place] party in the park and then 2 days later developed all these bumps with severe itching on her lower extremities and on her arms that she is fearful that she has measles though she has had no measles exposure.    Patient is alert oriented very anxious and states that she cannot find anything over the counter that would help her itching so she came for evaluation    Review of Systems   Constitutional:  Negative for chills, fatigue and fever.   HENT:  Negative for congestion, tinnitus and trouble swallowing.    Eyes:  Negative for photophobia, discharge and redness.   Respiratory:  Negative for cough and shortness of breath.    Cardiovascular:  Negative for chest pain and palpitations.   Gastrointestinal:  Negative for abdominal pain, diarrhea, nausea and vomiting.   Genitourinary:  Negative for dysuria, frequency and urgency.   Musculoskeletal:  Negative for back pain, joint swelling and myalgias.   Skin:  Positive for rash.   Neurological:  Negative for dizziness and headaches.   Psychiatric/Behavioral:  Negative for confusion. The patient is nervous/anxious.    All other systems reviewed and are negative.    Past Medical History:   Diagnosis Date    Anxiety     Depression     Elevated cholesterol     Headache        Allergies   Allergen Reactions    Topamax [Topiramate] Hives and Other (See Comments)     CAUSED KIDNEY STONES    Codeine Hives and Itching       Past Surgical History:   Procedure Laterality Date    SHOULDER ARTHROSCOPY Left     ROTATOR CUFF REPAIR    SHOULDER ARTHROSCOPY W/ ROTATOR CUFF REPAIR Right 8/24/2016    Procedure: RT SHOULDER MANIPULATION, RT SHOULDER ARTHROSCOPY WITH ROTATOR CUFF REPAIR, BANKART REPAIR  AND ACROMIOPLASTY ;  Surgeon: Francesco Wall MD;  [place] [facility] [place] [facility] [place]  Service:        Family History   Problem Relation Age of Onset    Heart failure Mother        Social  "History     Socioeconomic History    Marital status:    Tobacco Use    Smoking status: Never    Smokeless tobacco: Never   Vaping Use    Vaping Use: Never used   Substance and Sexual Activity    Alcohol use: No    Drug use: No    Sexual activity: Yes           Objective   Physical Exam  Vitals reviewed.   Constitutional:       Appearance: She is well-developed.   HENT:      Head: Normocephalic and atraumatic.   Eyes:      Conjunctiva/sclera: Conjunctivae normal.      Pupils: Pupils are equal, round, and reactive to light.   Cardiovascular:      Rate and Rhythm: Normal rate and regular rhythm.      Heart sounds: Normal heart sounds.   Pulmonary:      Effort: Pulmonary effort is normal. No respiratory distress.      Breath sounds: Normal breath sounds. No wheezing.   Abdominal:      General: Bowel sounds are normal. There is no distension.      Palpations: Abdomen is soft. There is no mass.      Tenderness: There is no abdominal tenderness. There is no guarding or rebound.   Musculoskeletal:         General: No deformity. Normal range of motion.      Cervical back: Normal range of motion and neck supple.   Skin:     General: Skin is warm and dry.      Capillary Refill: Capillary refill takes less than 2 seconds.      Findings: Rash present.      Comments: Multiple insect bites on bilateral lower legs and arms   Neurological:      General: No focal deficit present.      Mental Status: She is alert and oriented to person, place, and time.      GCS: GCS eye subscore is 4. GCS verbal subscore is 5. GCS motor subscore is 6.      Cranial Nerves: No cranial nerve deficit.      Sensory: No sensory deficit.      Deep Tendon Reflexes: Reflexes normal.   Psychiatric:         Mood and Affect: Mood normal.         Behavior: Behavior normal.       Procedures           ED Course      /81 (BP Location: Left arm, Patient Position: Sitting)   Pulse 69   Temp 97.4 °F (36.3 °C) (Oral)   Resp 18   Ht 162.6 cm (64\")   Wt " 63.9 kg (140 lb 14 oz)   SpO2 95%   BMI 24.18 kg/m²   Labs Reviewed - No data to display  Medications   predniSONE (DELTASONE) tablet 60 mg (60 mg Oral Given 9/4/23 2047)   hydrOXYzine (ATARAX) tablet 50 mg (50 mg Oral Given 9/4/23 2047)     No radiology results for the last day                                       Medical Decision Making  Patient is a 73-year-old presents with insect bites after having a birthday party for her grandchild in the park.  She states they are very itchy she is concerned for measles but there are no signs or symptoms of measles no fever no chills no lymphadenopathy.  The rash is clearly multiple insect bites will be treated with some Atarax for itching she was given some prednisone to help decrease the inflammation she was advised use of Benadryl or hydrocortisone cream over-the-counter and to see her primary care provider for recheck in 3 days if not improving or return if worse    Problems Addressed:  Insect bite, unspecified site, initial encounter: complicated acute illness or injury    Risk  Prescription drug management.        Final diagnoses:   Insect bite, unspecified site, initial encounter       ED Disposition  ED Disposition       ED Disposition   Discharge    Condition   Stable    Comment   --               Mina Padilla, PA  [address] [address]  [phone]    In 3 days  If symptoms worsen, As needed         Medication List        New Prescriptions      hydrOXYzine 25 MG tablet  Commonly known as: ATARAX  Take 1 tablet by mouth 3 (Three) Times a Day As Needed for Itching.     predniSONE 20 MG tablet  Commonly known as: DELTASONE  Take 1 tablet by mouth Daily.               Where to Get Your Medications        These medications were sent to [id number] - [place], [place] - [address] - [phone]  - [phone] FX  [address] [place] [address]      Phone: [phone]   hydrOXYzine 25 MG  tablet  predniSONE 20 MG tablet            Jessica Garcia, APRN  09/04/23 8013

## 2023-09-05 NOTE — DISCHARGE INSTRUCTIONS
Use hydrocortisone cream or Benadryl cream.    Use prednisone daily    Atarax as needed for itching.     Follow up with Primary care as needed-     Return if worse.

## 2024-11-26 ENCOUNTER — TELEPHONE (OUTPATIENT)
Dept: ONCOLOGY | Facility: CLINIC | Age: 74
End: 2024-11-26
Payer: COMMERCIAL

## 2024-12-05 ENCOUNTER — TELEPHONE (OUTPATIENT)
Dept: ONCOLOGY | Facility: CLINIC | Age: 74
End: 2024-12-05
Payer: COMMERCIAL

## 2025-06-21 ENCOUNTER — HOSPITAL ENCOUNTER (INPATIENT)
Facility: HOSPITAL | Age: 75
LOS: 1 days | Discharge: NURSING, REHAB OR LONG-TERM CARE | End: 2025-06-24
Attending: EMERGENCY MEDICINE | Admitting: INTERNAL MEDICINE
Payer: MEDICARE

## 2025-06-21 ENCOUNTER — APPOINTMENT (OUTPATIENT)
Dept: GENERAL RADIOLOGY | Facility: HOSPITAL | Age: 75
End: 2025-06-21
Payer: MEDICARE

## 2025-06-21 ENCOUNTER — APPOINTMENT (OUTPATIENT)
Dept: CT IMAGING | Facility: HOSPITAL | Age: 75
End: 2025-06-21
Payer: MEDICARE

## 2025-06-21 DIAGNOSIS — M54.50 LOW BACK PAIN RADIATING TO RIGHT LOWER EXTREMITY: ICD-10-CM

## 2025-06-21 DIAGNOSIS — M25.551 PAIN OF RIGHT HIP: ICD-10-CM
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
## 2025-06-21 DIAGNOSIS — M79.604 LOW BACK PAIN RADIATING TO RIGHT LOWER EXTREMITY: ICD-10-CM

## 2025-06-21 DIAGNOSIS — R10.2 PELVIC PAIN: ICD-10-CM

## 2025-06-21 LAB
ALBUMIN SERPL-MCNC: 3.9 G/DL (ref 3.5–5.2)
ALBUMIN/GLOB SERPL: 1.8 G/DL
ALP SERPL-CCNC: 67 U/L (ref 39–117)
ALT SERPL W P-5'-P-CCNC: 6 U/L (ref 1–33)
ANION GAP SERPL CALCULATED.3IONS-SCNC: 11.9 MMOL/L (ref 5–15)
ANISOCYTOSIS BLD QL: NORMAL
AST SERPL-CCNC: 19 U/L (ref 1–32)
BASOPHILS # BLD AUTO: 0.06 10*3/MM3 (ref 0–0.2)
BASOPHILS NFR BLD AUTO: 0.5 % (ref 0–1.5)
BILIRUB SERPL-MCNC: 0.7 MG/DL (ref 0–1.2)
BUN SERPL-MCNC: 14.8 MG/DL (ref 8–23)
BUN/CREAT SERPL: 17.2 (ref 7–25)
CALCIUM SPEC-SCNC: 8.8 MG/DL (ref 8.6–10.5)
CHLORIDE SERPL-SCNC: 109 MMOL/L (ref 98–107)
CO2 SERPL-SCNC: 24.1 MMOL/L (ref 22–29)
CREAT SERPL-MCNC: 0.86 MG/DL (ref 0.57–1)
DEPRECATED RDW RBC AUTO: 49.3 FL (ref 37–54)
EGFRCR SERPLBLD CKD-EPI 2021: 71 ML/MIN/1.73
EOSINOPHIL # BLD AUTO: 0.03 10*3/MM3 (ref 0–0.4)
EOSINOPHIL NFR BLD AUTO: 0.2 % (ref 0.3–6.2)
ERYTHROCYTE [DISTWIDTH] IN BLOOD BY AUTOMATED COUNT: 13.6 % (ref 12.3–15.4)
GIANT PLATELETS: NORMAL
GLOBULIN UR ELPH-MCNC: 2.2 GM/DL
GLUCOSE SERPL-MCNC: 98 MG/DL (ref 65–99)
HCT VFR BLD AUTO: 37 % (ref 34–46.6)
HGB BLD-MCNC: 11.6 G/DL (ref 12–15.9)
IMM GRANULOCYTES # BLD AUTO: 0.08 10*3/MM3 (ref 0–0.05)
IMM GRANULOCYTES NFR BLD AUTO: 0.6 % (ref 0–0.5)
LARGE PLATELETS: NORMAL
LYMPHOCYTES # BLD AUTO: 2.22 10*3/MM3 (ref 0.7–3.1)
LYMPHOCYTES NFR BLD AUTO: 17.3 % (ref 19.6–45.3)
MCH RBC QN AUTO: 31.3 PG (ref 26.6–33)
MCHC RBC AUTO-ENTMCNC: 31.4 G/DL (ref 31.5–35.7)
MCV RBC AUTO: 99.7 FL (ref 79–97)
MONOCYTES # BLD AUTO: 1.09 10*3/MM3 (ref 0.1–0.9)
MONOCYTES NFR BLD AUTO: 8.5 % (ref 5–12)
NEUTROPHILS NFR BLD AUTO: 72.9 % (ref 42.7–76)
NEUTROPHILS NFR BLD AUTO: 9.36 10*3/MM3 (ref 1.7–7)
NRBC BLD AUTO-RTO: 0 /100 WBC (ref 0–0.2)
PLATELET # BLD AUTO: 154 10*3/MM3 (ref 140–450)
PMV BLD AUTO: 11.5 FL (ref 6–12)
POLYCHROMASIA BLD QL SMEAR: NORMAL
POTASSIUM SERPL-SCNC: 3.4 MMOL/L (ref 3.5–5.2)
PROT SERPL-MCNC: 6.1 G/DL (ref 6–8.5)
RBC # BLD AUTO: 3.71 10*6/MM3 (ref 3.77–5.28)
SODIUM SERPL-SCNC: 145 MMOL/L (ref 136–145)
WBC MORPH BLD: NORMAL
WBC NRBC COR # BLD AUTO: 12.84 10*3/MM3 (ref 3.4–10.8)

## 2025-06-21 PROCEDURE — 80053 COMPREHEN METABOLIC PANEL: CPT

## 2025-06-21 PROCEDURE — 72125 CT NECK SPINE W/O DYE: CPT

## 2025-06-21 PROCEDURE — 36415 COLL VENOUS BLD VENIPUNCTURE: CPT

## 2025-06-21 PROCEDURE — 99285 EMERGENCY DEPT VISIT HI MDM: CPT

## 2025-06-21 PROCEDURE — 72170 X-RAY EXAM OF PELVIS: CPT

## 2025-06-21 PROCEDURE — 70450 CT HEAD/BRAIN W/O DYE: CPT

## 2025-06-21 PROCEDURE — 85007 BL SMEAR W/DIFF WBC COUNT: CPT

## 2025-06-21 PROCEDURE — 25810000003 SODIUM CHLORIDE 0.9 % SOLUTION

## 2025-06-21 PROCEDURE — 85025 COMPLETE CBC W/AUTO DIFF WBC: CPT

## 2025-06-21 PROCEDURE — 72192 CT PELVIS W/O DYE: CPT

## 2025-06-21 PROCEDURE — 25010000002 MORPHINE PER 10 MG

## 2025-06-21 PROCEDURE — 25010000002 ONDANSETRON PER 1 MG

## 2025-06-21 RX ORDER — BISACODYL 10 MG
10 SUPPOSITORY, RECTAL RECTAL DAILY PRN
Status: DISCONTINUED | OUTPATIENT
Start: 2025-06-21 | End: 2025-06-22

## 2025-06-21 RX ORDER — BISACODYL 5 MG/1
5 TABLET, DELAYED RELEASE ORAL DAILY PRN
Status: DISCONTINUED | OUTPATIENT
Start: 2025-06-21 | End: 2025-06-22

## 2025-06-21 RX ORDER — MORPHINE SULFATE 2 MG/ML
2 INJECTION, SOLUTION INTRAMUSCULAR; INTRAVENOUS EVERY 4 HOURS PRN
Status: DISPENSED | OUTPATIENT
Start: 2025-06-21 | End: 2025-06-22

## 2025-06-21 RX ORDER — ONDANSETRON 2 MG/ML
4 INJECTION INTRAMUSCULAR; INTRAVENOUS EVERY 6 HOURS PRN
Status: DISPENSED | OUTPATIENT
Start: 2025-06-21 | End: 2025-06-22

## 2025-06-21 RX ORDER — SODIUM CHLORIDE 0.9 % (FLUSH) 0.9 %
10 SYRINGE (ML) INJECTION EVERY 12 HOURS SCHEDULED
Status: DISCONTINUED | OUTPATIENT
Start: 2025-06-21 | End: 2025-06-24 | Stop reason: HOSPADM

## 2025-06-21 RX ORDER — ACETAMINOPHEN 325 MG/1
650 TABLET ORAL EVERY 4 HOURS PRN
Status: DISCONTINUED | OUTPATIENT
Start: 2025-06-21 | End: 2025-06-24 | Stop reason: HOSPADM

## 2025-06-21 RX ORDER — SODIUM CHLORIDE 9 MG/ML
40 INJECTION, SOLUTION INTRAVENOUS AS NEEDED
Status: DISCONTINUED | OUTPATIENT
Start: 2025-06-21 | End: 2025-06-24 | Stop reason: HOSPADM

## 2025-06-21 RX ORDER — ALUMINA, MAGNESIA, AND SIMETHICONE 2400; 2400; 240 MG/30ML; MG/30ML; MG/30ML
15 SUSPENSION ORAL EVERY 6 HOURS PRN
Status: DISCONTINUED | OUTPATIENT
Start: 2025-06-21 | End: 2025-06-24 | Stop reason: HOSPADM

## 2025-06-21 RX ORDER — SODIUM CHLORIDE 0.9 % (FLUSH) 0.9 %
10 SYRINGE (ML) INJECTION AS NEEDED
Status: DISCONTINUED | OUTPATIENT
Start: 2025-06-21 | End: 2025-06-24 | Stop reason: HOSPADM

## 2025-06-21 RX ORDER — ONDANSETRON 2 MG/ML
4 INJECTION INTRAMUSCULAR; INTRAVENOUS ONCE
Status: DISCONTINUED | OUTPATIENT
Start: 2025-06-21 | End: 2025-06-24 | Stop reason: HOSPADM

## 2025-06-21 RX ORDER — AMOXICILLIN 250 MG
2 CAPSULE ORAL 2 TIMES DAILY PRN
Status: DISCONTINUED | OUTPATIENT
Start: 2025-06-21 | End: 2025-06-22

## 2025-06-21 RX ORDER — POLYETHYLENE GLYCOL 3350 17 G/17G
17 POWDER, FOR SOLUTION ORAL DAILY PRN
Status: DISCONTINUED | OUTPATIENT
Start: 2025-06-21 | End: 2025-06-22

## 2025-06-21 RX ORDER — ACETAMINOPHEN 160 MG/5ML
650 SOLUTION ORAL EVERY 4 HOURS PRN
Status: DISCONTINUED | OUTPATIENT
Start: 2025-06-21 | End: 2025-06-24 | Stop reason: HOSPADM

## 2025-06-21 RX ORDER — ACETAMINOPHEN 650 MG/1
650 SUPPOSITORY RECTAL EVERY 4 HOURS PRN
Status: DISCONTINUED | OUTPATIENT
Start: 2025-06-21 | End: 2025-06-24 | Stop reason: HOSPADM

## 2025-06-21 RX ORDER — ONDANSETRON 2 MG/ML
4 INJECTION INTRAMUSCULAR; INTRAVENOUS ONCE
Status: COMPLETED | OUTPATIENT
Start: 2025-06-21 | End: 2025-06-21

## 2025-06-21 RX ADMIN — ONDANSETRON 4 MG: 2 INJECTION, SOLUTION INTRAMUSCULAR; INTRAVENOUS at 16:11

## 2025-06-21 RX ADMIN — MORPHINE SULFATE 4 MG: 4 INJECTION, SOLUTION INTRAMUSCULAR; INTRAVENOUS at 16:12

## 2025-06-21 RX ADMIN — MORPHINE SULFATE 2 MG: 2 INJECTION, SOLUTION INTRAMUSCULAR; INTRAVENOUS at 22:30

## 2025-06-21 RX ADMIN — SODIUM CHLORIDE 500 ML: 9 INJECTION, SOLUTION INTRAVENOUS at 19:56

## 2025-06-21 NOTE — ED PROVIDER NOTES
Subjective     Chief Complaint   Patient presents with    Fall     History of Present Illness  Chief complaint: Fall      Context: Patient is 75-year-old female with a history of hyperlipidemia and anxiety depression who presents the emergency department complaining of pain after a fall 3 to 4 days ago.  Patient states that she rolled down a hill, she does not know how long the heel was or how far she rolled.  Patient states likely LOC and positive head impact.  Patient currently complains of pain in her right gluteal area.  Patient denies any other new pain from fall and denies chest pain or shortness of breath at this time.        PCP: Mina Padilla PA    LNMP: Postmenopausal               Review of Systems   Musculoskeletal:  Positive for arthralgias.       Past Medical History:   Diagnosis Date    Anxiety     Depression     Elevated cholesterol     Headache        Allergies   Allergen Reactions    Topamax [Topiramate] Hives and Other (See Comments)     CAUSED KIDNEY STONES    Codeine Hives and Itching       Past Surgical History:   Procedure Laterality Date    SHOULDER ARTHROSCOPY Left     ROTATOR CUFF REPAIR    SHOULDER ARTHROSCOPY W/ ROTATOR CUFF REPAIR Right 8/24/2016    Procedure: RT SHOULDER MANIPULATION, RT SHOULDER ARTHROSCOPY WITH ROTATOR CUFF REPAIR, BANKART REPAIR  AND ACROMIOPLASTY ;  Surgeon: Francesco Wall MD;  [place] [facility] [place] [facility] [place]:        Family History   Problem Relation Age of Onset    Heart failure Mother        Social History     Socioeconomic History    Marital status:    Tobacco Use    Smoking status: Never    Smokeless tobacco: Never   Vaping Use    Vaping status: Never Used   Substance and Sexual Activity    Alcohol use: No    Drug use: No    Sexual activity: Yes           Objective   Physical Exam  Vitals and nursing note reviewed.   Constitutional:       General: She is not in acute distress.     Appearance: Normal appearance. She is normal weight. She is not  ill-appearing or toxic-appearing.   HENT:      Head: Normocephalic and atraumatic.      Nose: No congestion or rhinorrhea.      Mouth/Throat:      Mouth: Mucous membranes are moist.      Pharynx: Oropharynx is clear.   Eyes:      Extraocular Movements: Extraocular movements intact.      Pupils: Pupils are equal, round, and reactive to light.   Cardiovascular:      Rate and Rhythm: Normal rate and regular rhythm.      Pulses: Normal pulses.      Heart sounds: Normal heart sounds. No murmur heard.     No friction rub. No gallop.   Pulmonary:      Effort: Pulmonary effort is normal. No respiratory distress.      Breath sounds: No stridor. No wheezing, rhonchi or rales.   Abdominal:      General: Bowel sounds are normal.      Palpations: Abdomen is soft.      Tenderness: There is no abdominal tenderness.   Musculoskeletal:         General: Tenderness and signs of injury present. No swelling or deformity.      Cervical back: Rigidity and tenderness present.      Right hip: Tenderness present. Decreased range of motion.      Right lower leg: No edema.      Left lower leg: No edema.   Skin:     General: Skin is warm and dry.      Capillary Refill: Capillary refill takes less than 2 seconds.      Coloration: Skin is not jaundiced or pale.   Neurological:      General: No focal deficit present.      Mental Status: She is alert. Mental status is at baseline.         Procedures           ED Course  ED Course as of 06/21/25 2109   Sat Jun 21, 2025   1706 CT final results reviewed and discussed with patient.  C-collar was removed at this time. [RS]   1717 X-ray results reviewed and discussed with patient. [RS]   1804 Patient needs to successfully ambulate before dispo decision. [RS]   1851 Patient is not able to ambulate.  She will have to be admitted.  Will consult hospitalist after lab work and pelvic CT have final results. [RS]   2002 Waiting for CT pelvis final read. [RS]      ED Course User Index  [RS] Lamonte Bowman,  "PA-C      /82   Pulse 66   Temp 97.7 °F (36.5 °C)   Resp 19   Ht 162.6 cm (64\")   Wt 64 kg (141 lb 1.5 oz)   SpO2 95%   BMI 24.22 kg/m²   Labs Reviewed   COMPREHENSIVE METABOLIC PANEL - Abnormal; Notable for the following components:       Result Value    Potassium 3.4 (*)     Chloride 109 (*)     All other components within normal limits    Narrative:     GFR Categories in Chronic Kidney Disease (CKD)              GFR Category          GFR (mL/min/1.73)    Interpretation  G1                    90 or greater        Normal or high (1)  G2                    60-89                Mild decrease (1)  G3a                   45-59                Mild to moderate decrease  G3b                   30-44                Moderate to severe decrease  G4                    15-29                Severe decrease  G5                    14 or less           Kidney failure    (1)In the absence of evidence of kidney disease, neither GFR category G1 or G2 fulfill the criteria for CKD.    eGFR calculation 2021 CKD-EPI creatinine equation, which does not include race as a factor   CBC WITH AUTO DIFFERENTIAL - Abnormal; Notable for the following components:    WBC 12.84 (*)     RBC 3.71 (*)     Hemoglobin 11.6 (*)     MCV 99.7 (*)     MCHC 31.4 (*)     Lymphocyte % 17.3 (*)     Eosinophil % 0.2 (*)     Immature Grans % 0.6 (*)     Neutrophils, Absolute 9.36 (*)     Monocytes, Absolute 1.09 (*)     Immature Grans, Absolute 0.08 (*)     All other components within normal limits   SCAN SLIDE   URINALYSIS W/ CULTURE IF INDICATED   CBC AND DIFFERENTIAL    Narrative:     The following orders were created for panel order CBC & Differential.  Procedure                               Abnormality         Status                     ---------                               -----------         ------                     CBC Auto Differential[[id number]]        Abnormal            Final result               Scan Slide[[id number]]                    " "                   Final result                 Please view results for these tests on the individual orders.     Medications   ondansetron (ZOFRAN) injection 4 mg (4 mg Intravenous Not Given 6/21/25 1956)   morphine injection 4 mg (4 mg Intravenous Given 6/21/25 1612)   ondansetron (ZOFRAN) injection 4 mg (4 mg Intravenous Given 6/21/25 1611)   sodium chloride 0.9 % bolus 500 mL (0 mL Intravenous Stopped 6/21/25 2032)     CT Pelvis Without Contrast  Result Date: 6/21/2025  Impression: 1.No acute osseous abnormality. 2.Mild bilateral hip arthritis. 3.Mild right SI joint arthritis. 4.Partially visualized lumbar spondylosis. Electronically Signed: Shai Andrews MD  6/21/2025 8:24 PM EDT  Workstation ID: [id number]    XR Pelvis 1 or 2 View  Result Date: 6/21/2025  Impression: 1.No evidence of acute fracture. 2.Mild bilateral hip arthritis. Electronically Signed: Shai Andrews MD  6/21/2025 5:08 PM EDT  Workstation ID: [id number]    CT Head Without Contrast  Result Date: 6/21/2025  Impression: 1.No acute intracranial abnormality is identified. 2.Sphenoid sinus disease. 3.No acute cervical spine fracture is identified. 4.Minimal grade 1 anterolisthesis of C4 on C5. 5.Multilevel mild to moderate degenerative changes. Electronically Signed: Rebecca Zamora MD  6/21/2025 4:56 PM EDT  Workstation ID: [id number]    CT Cervical Spine Without Contrast  Result Date: 6/21/2025  Impression: 1.No acute intracranial abnormality is identified. 2.Sphenoid sinus disease. 3.No acute cervical spine fracture is identified. 4.Minimal grade 1 anterolisthesis of C4 on C5. 5.Multilevel mild to moderate degenerative changes. Electronically Signed: Rebecca Zamora MD  6/21/2025 4:56 PM EDT  Workstation ID: [id number]                                                     Medical Decision Making  /61   Pulse 70   Temp 97.7 °F (36.5 °C)   Resp 19   Ht 162.6 cm (64\")   Wt 64 kg (141 lb 1.5 oz)   SpO2 91%   BMI 24.22 kg/m²      Chart review: Patient " has no ED/UC visits in recent years for review.    Patient is 75-year-old female who presents emergency department via EMS after fall and rolled down a hill.  See full HPI with primary assessment and exam above.    Patient is placed into ED bed for assessment.  C-collar applied by EMS remains on patient until clearance is complete with imaging.    Comorbidities:  has a past medical history of Anxiety, Depression, Elevated cholesterol, and Headache.    Discussion with provider: Dr. Onel Donaldson    Radiology interpretation:  Imaging reviewed by ED Attending physician and myself and interpreted by radiologist.  CT Pelvis Without Contrast  Result Date: 6/21/2025  Impression: 1.No acute osseous abnormality. 2.Mild bilateral hip arthritis. 3.Mild right SI joint arthritis. 4.Partially visualized lumbar spondylosis. Electronically Signed: Shai Andrews MD  6/21/2025 8:24 PM EDT  Workstation ID: [id number]    XR Pelvis 1 or 2 View  Result Date: 6/21/2025  Impression: 1.No evidence of acute fracture. 2.Mild bilateral hip arthritis. Electronically Signed: Shai Andrews MD  6/21/2025 5:08 PM EDT  Workstation ID: [id number]    CT Head Without Contrast  Result Date: 6/21/2025  Impression: 1.No acute intracranial abnormality is identified. 2.Sphenoid sinus disease. 3.No acute cervical spine fracture is identified. 4.Minimal grade 1 anterolisthesis of C4 on C5. 5.Multilevel mild to moderate degenerative changes. Electronically Signed: Rebecca Zamora MD  6/21/2025 4:56 PM EDT  Workstation ID: [id number]    CT Cervical Spine Without Contrast  Result Date: 6/21/2025  Impression: 1.No acute intracranial abnormality is identified. 2.Sphenoid sinus disease. 3.No acute cervical spine fracture is identified. 4.Minimal grade 1 anterolisthesis of C4 on C5. 5.Multilevel mild to moderate degenerative changes. Electronically Signed: Rebecca Zamora MD  6/21/2025 4:56 PM EDT  Workstation ID: [id number]    Lab interpretation:  Labs viewed by me significant  for leukocytosis of 12.8 and minor anemia with minor electrolyte derangement on CMP.  No kidney injury noted on CMP.    Medications given in ED:  ondansetron (ZOFRAN) injection 4 mg (4 mg Intravenous Not Given 6/21/25 1956)  morphine injection 4 mg (4 mg Intravenous Given 6/21/25 1612)  ondansetron (ZOFRAN) injection 4 mg (4 mg Intravenous Given 6/21/25 1611)  sodium chloride 0.9 % bolus 500 mL (0 mL Intravenous Stopped 6/21/25 2032)    All results reviewed with patient and plan of care options discussed.  Repeat physical exam completed as well.  Exam is unchanged from previous; patient remains alert and oriented, nontoxic in appearance GCS 15.  Patient is ambulated to assess for abnormal gait, dizziness, and falls risk.  Patient is not able to ambulate independently and requires assistance x 2.  Due to this patient will need to be admitted to the hospital and evaluated for physical therapy and possible rehab placement.  Patient verbalizes understanding of this plan and is amenable to same.    Appropriate PPE worn during exam.    I discussed findings with patient who voiced understanding of discharge instructions, signs and symptoms requiring return to ED; discharged improved and in stable condition with follow up for re-evaluation.  This document is intended for medical expert use only. Reading of this document by patients and/or patient's family without participating medical staff guidance may result in misinterpretation and unintended morbidity.  Any interpretation of such data is the responsibility of the patient and/or family member responsible for the patient in concert with their primary or specialist providers, not to be left for sources of online searches such as [employer], [employer] or similar queries. Relying on these approaches to knowledge may result in misinterpretation, misguided goals of care and even death should patients or family members try recommendations outside of the realm of professional medical  care in a supervised inpatient environment.         Amount and/or Complexity of Data Reviewed  Labs: ordered.  Radiology: ordered.    Risk  Prescription drug management.        Final diagnoses:   Fall, initial encounter   Pain of right hip   Pelvic pain       ED Disposition  ED Disposition       ED Disposition   Intended Admit    Condition   --    Comment   --               Mina Padilla PA  [address] [address]  [phone]               Medication List      No changes were made to your prescriptions during this visit.            Lamonte Bowman, PRERNA  06/21/25 2109       Lamonte Bowman PA-C  06/21/25 2114

## 2025-06-21 NOTE — ED NOTES
Ambulated patient per verbal order from provider. Patient reports increased pain in right hip on movement, was able to shuffle a couple steps but required 2 person assist to do so. Provider notified of patient's mobility.

## 2025-06-21 NOTE — ED NOTES
Fall 4 days ago, states she rolled down a hill. Patient says she's been in bed and has not moved since. Patient reports right hip/lower back pain. Patient denies numbness or tingling in extremities. Patient denies loss of bowel or bladder. Patient demonstrated movement in bilateral lower extremities. Patient placed in C-collar by EMS. EMS reports giving 5mg IV morphine PTA.

## 2025-06-22 PROBLEM — M25.551 PAIN OF RIGHT HIP: Status: ACTIVE | Noted: 2025-06-22

## 2025-06-22 PROBLEM — Z74.09 IMPAIRED MOBILITY: Status: ACTIVE | Noted: 2025-06-22

## 2025-06-22 PROBLEM — M54.50 LOW BACK PAIN RADIATING TO RIGHT LOWER EXTREMITY: Status: ACTIVE | Noted: 2025-06-22

## 2025-06-22 PROBLEM — M79.604 LOW BACK PAIN RADIATING TO RIGHT LOWER EXTREMITY: Status: ACTIVE | Noted: 2025-06-22
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
## 2025-06-22 LAB
ANION GAP SERPL CALCULATED.3IONS-SCNC: 10.2 MMOL/L (ref 5–15)
BACTERIA UR QL AUTO: ABNORMAL /HPF
BASOPHILS # BLD AUTO: 0.05 10*3/MM3 (ref 0–0.2)
BASOPHILS NFR BLD AUTO: 0.5 % (ref 0–1.5)
BILIRUB UR QL STRIP: NEGATIVE
BUN SERPL-MCNC: 16.6 MG/DL (ref 8–23)
BUN/CREAT SERPL: 18 (ref 7–25)
CALCIUM SPEC-SCNC: 9.4 MG/DL (ref 8.6–10.5)
CHLORIDE SERPL-SCNC: 107 MMOL/L (ref 98–107)
CLARITY UR: CLEAR
CO2 SERPL-SCNC: 26.8 MMOL/L (ref 22–29)
COLOR UR: YELLOW
CREAT SERPL-MCNC: 0.92 MG/DL (ref 0.57–1)
DEPRECATED RDW RBC AUTO: 51 FL (ref 37–54)
EGFRCR SERPLBLD CKD-EPI 2021: 65.5 ML/MIN/1.73
EOSINOPHIL # BLD AUTO: 0.03 10*3/MM3 (ref 0–0.4)
EOSINOPHIL NFR BLD AUTO: 0.3 % (ref 0.3–6.2)
ERYTHROCYTE [DISTWIDTH] IN BLOOD BY AUTOMATED COUNT: 13.7 % (ref 12.3–15.4)
GLUCOSE SERPL-MCNC: 126 MG/DL (ref 65–99)
GLUCOSE UR STRIP-MCNC: NEGATIVE MG/DL
HCT VFR BLD AUTO: 42.6 % (ref 34–46.6)
HGB BLD-MCNC: 13.3 G/DL (ref 12–15.9)
HGB UR QL STRIP.AUTO: NEGATIVE
HYALINE CASTS UR QL AUTO: ABNORMAL /LPF
IMM GRANULOCYTES # BLD AUTO: 0.05 10*3/MM3 (ref 0–0.05)
IMM GRANULOCYTES NFR BLD AUTO: 0.5 % (ref 0–0.5)
KETONES UR QL STRIP: ABNORMAL
LEUKOCYTE ESTERASE UR QL STRIP.AUTO: ABNORMAL
LYMPHOCYTES # BLD AUTO: 1.17 10*3/MM3 (ref 0.7–3.1)
LYMPHOCYTES NFR BLD AUTO: 10.6 % (ref 19.6–45.3)
MCH RBC QN AUTO: 31.3 PG (ref 26.6–33)
MCHC RBC AUTO-ENTMCNC: 31.2 G/DL (ref 31.5–35.7)
MCV RBC AUTO: 100.2 FL (ref 79–97)
MONOCYTES # BLD AUTO: 0.86 10*3/MM3 (ref 0.1–0.9)
MONOCYTES NFR BLD AUTO: 7.8 % (ref 5–12)
NEUTROPHILS NFR BLD AUTO: 8.88 10*3/MM3 (ref 1.7–7)
NEUTROPHILS NFR BLD AUTO: 80.3 % (ref 42.7–76)
NITRITE UR QL STRIP: NEGATIVE
NRBC BLD AUTO-RTO: 0 /100 WBC (ref 0–0.2)
PH UR STRIP.AUTO: <=5 [PH] (ref 5–8)
PLATELET # BLD AUTO: 225 10*3/MM3 (ref 140–450)
PMV BLD AUTO: 11.7 FL (ref 6–12)
POTASSIUM SERPL-SCNC: 4.6 MMOL/L (ref 3.5–5.2)
PROT UR QL STRIP: NEGATIVE
RBC # BLD AUTO: 4.25 10*6/MM3 (ref 3.77–5.28)
RBC # UR STRIP: ABNORMAL /HPF
REF LAB TEST METHOD: ABNORMAL
SODIUM SERPL-SCNC: 144 MMOL/L (ref 136–145)
SP GR UR STRIP: 1.02 (ref 1–1.03)
SQUAMOUS #/AREA URNS HPF: ABNORMAL /HPF
UROBILINOGEN UR QL STRIP: ABNORMAL
WBC # UR STRIP: ABNORMAL /HPF
WBC NRBC COR # BLD AUTO: 11.04 10*3/MM3 (ref 3.4–10.8)

## 2025-06-22 PROCEDURE — 97166 OT EVAL MOD COMPLEX 45 MIN: CPT

## 2025-06-22 PROCEDURE — 80048 BASIC METABOLIC PNL TOTAL CA: CPT | Performed by: NURSE PRACTITIONER

## 2025-06-22 PROCEDURE — 87086 URINE CULTURE/COLONY COUNT: CPT

## 2025-06-22 PROCEDURE — 85025 COMPLETE CBC W/AUTO DIFF WBC: CPT | Performed by: NURSE PRACTITIONER

## 2025-06-22 PROCEDURE — G0378 HOSPITAL OBSERVATION PER HR: HCPCS

## 2025-06-22 PROCEDURE — 97162 PT EVAL MOD COMPLEX 30 MIN: CPT

## 2025-06-22 PROCEDURE — 25010000002 ONDANSETRON PER 1 MG

## 2025-06-22 PROCEDURE — 81001 URINALYSIS AUTO W/SCOPE: CPT

## 2025-06-22 RX ORDER — HYDROCODONE BITARTRATE AND ACETAMINOPHEN 5; 325 MG/1; MG/1
1 TABLET ORAL ONCE AS NEEDED
Refills: 0 | Status: COMPLETED | OUTPATIENT
Start: 2025-06-22 | End: 2025-06-22

## 2025-06-22 RX ORDER — ALPRAZOLAM 1 MG/1
2 TABLET ORAL EVERY 6 HOURS PRN
Status: DISCONTINUED | OUTPATIENT
Start: 2025-06-22 | End: 2025-06-24 | Stop reason: HOSPADM

## 2025-06-22 RX ORDER — BISACODYL 5 MG/1
5 TABLET, DELAYED RELEASE ORAL DAILY PRN
Status: DISCONTINUED | OUTPATIENT
Start: 2025-06-22 | End: 2025-06-24 | Stop reason: HOSPADM

## 2025-06-22 RX ORDER — ROSUVASTATIN CALCIUM 10 MG/1
20 TABLET, COATED ORAL DAILY
Status: DISCONTINUED | OUTPATIENT
Start: 2025-06-22 | End: 2025-06-24 | Stop reason: HOSPADM

## 2025-06-22 RX ORDER — BISACODYL 10 MG
10 SUPPOSITORY, RECTAL RECTAL DAILY PRN
Status: DISCONTINUED | OUTPATIENT
Start: 2025-06-22 | End: 2025-06-24 | Stop reason: HOSPADM

## 2025-06-22 RX ORDER — AMOXICILLIN 250 MG
2 CAPSULE ORAL 2 TIMES DAILY
Status: DISCONTINUED | OUTPATIENT
Start: 2025-06-22 | End: 2025-06-24 | Stop reason: HOSPADM

## 2025-06-22 RX ORDER — ESCITALOPRAM OXALATE 10 MG/1
20 TABLET ORAL DAILY
Status: DISCONTINUED | OUTPATIENT
Start: 2025-06-22 | End: 2025-06-24 | Stop reason: HOSPADM

## 2025-06-22 RX ORDER — POLYETHYLENE GLYCOL 3350 17 G/17G
17 POWDER, FOR SOLUTION ORAL DAILY PRN
Status: DISCONTINUED | OUTPATIENT
Start: 2025-06-22 | End: 2025-06-24 | Stop reason: HOSPADM

## 2025-06-22 RX ADMIN — Medication 10 ML: at 21:25

## 2025-06-22 RX ADMIN — SENNOSIDES AND DOCUSATE SODIUM 2 TABLET: 50; 8.6 TABLET ORAL at 08:36

## 2025-06-22 RX ADMIN — Medication 10 ML: at 01:13

## 2025-06-22 RX ADMIN — ACETAMINOPHEN 650 MG: 325 TABLET, FILM COATED ORAL at 05:19

## 2025-06-22 RX ADMIN — ROSUVASTATIN CALCIUM 20 MG: 10 TABLET, FILM COATED ORAL at 08:36

## 2025-06-22 RX ADMIN — ONDANSETRON 4 MG: 2 INJECTION INTRAMUSCULAR; INTRAVENOUS at 15:52

## 2025-06-22 RX ADMIN — ALPRAZOLAM 2 MG: 1 TABLET ORAL at 21:35

## 2025-06-22 RX ADMIN — OXYBUTYNIN CHLORIDE 15 MG: 5 TABLET, EXTENDED RELEASE ORAL at 08:36

## 2025-06-22 RX ADMIN — AMOXICILLIN AND CLAVULANATE POTASSIUM 1 TABLET: 875; 125 TABLET, COATED ORAL at 11:37

## 2025-06-22 RX ADMIN — ONDANSETRON 4 MG: 2 INJECTION INTRAMUSCULAR; INTRAVENOUS at 08:42

## 2025-06-22 RX ADMIN — HYDROCODONE BITARTRATE AND ACETAMINOPHEN 1 TABLET: 5; 325 TABLET ORAL at 01:12

## 2025-06-22 RX ADMIN — ESCITALOPRAM 20 MG: 10 TABLET, FILM COATED ORAL at 08:36

## 2025-06-22 NOTE — PLAN OF CARE
Goal Outcome Evaluation:      New admit from ED, fell at home approx. 3 days ago-has not been walking since, Norco given when arrived to floor, morphine given in ED, PT/OT eval in am

## 2025-06-22 NOTE — PLAN OF CARE
Goal Outcome Evaluation:  Plan of Care Reviewed With: patient        Progress: no change  Outcome Evaluation: Pt is a 76 y/o F who presented to [facility]/21/25 after mechanical fall, LBP and R hip pain. At baseline, pt resides with significant other in [facility] with 4 EFRA. Pt typically (I) with ADLs, (I) with mobility without AD and drives. Pt cleared for OT by nursing, oriented x4 on 2L O2 sitting up in bed upon arrival. Pt c/o back and R hip pain and nausea, unable to have medications at time of arrival. Pt titrated to RA, O2 >92% throughout, reapplied end of session. Pt requires mod A x2 for bed mobility, limited d/t pain, requiring use of repositioning sheet, verbal cues and task segmentation. Pt comes to standing with charmaine HHA with min A, demo difficulty weight shifting and advancing RLE. Pt will require significant assist with ADL routine and mobility. Once pain is controlled, anticipate will be able to mobilize further with less assist. OT recommend acute IP rehab when medically appropriate for dc to increase (I) to max potential. OT will follow within acute setting and progress as appropriate.    Anticipated Discharge Disposition (OT): inpatient rehabilitation facility

## 2025-06-22 NOTE — THERAPY EVALUATION
Patient Name: Yaneli Hernandez  : 1950    MRN: [id number]                              Today's Date: 2025       Admit Date: 2025    Visit Dx:     ICD-10-CM ICD-9-CM   1. Fall, initial encounter  W19.XXXA E888.9   2. Pain of right hip  M25.551 719.45   3. Pelvic pain  R10.2 [id number]     Patient Active Problem List   Diagnosis    Chronic kidney disease, stage 3a    Hyperlipidemia    Palpitations    Syncope and collapse    Low back pain radiating to right lower extremity    Fall    Pain of right hip    Impaired mobility     Past Medical History:   Diagnosis Date    Anxiety     Depression     Elevated cholesterol     Headache      Past Surgical History:   Procedure Laterality Date    SHOULDER ARTHROSCOPY Left     ROTATOR CUFF REPAIR    SHOULDER ARTHROSCOPY W/ ROTATOR CUFF REPAIR Right 2016    Procedure: RT SHOULDER MANIPULATION, RT SHOULDER ARTHROSCOPY WITH ROTATOR CUFF REPAIR, BANKART REPAIR  AND ACROMIOPLASTY ;  Surgeon: Francesco Wall MD;  Location: [facility] OR [facility];  Service:       General Information       Row Name 25 1505          OT Time and Intention    Subjective Information complains of;pain;nausea/vomiting  -MS     Document Type evaluation  -MS     Mode of Treatment occupational therapy  -MS     Patient Effort adequate  -MS       Row Name 25 1505          General Information    Patient Profile Reviewed yes  -MS     Prior Level of Function independent:;ADL's;all household mobility;driving;community mobility  -MS     Existing Precautions/Restrictions fall;oxygen therapy device and L/min  on/off O2  -MS     Barriers to Rehab none identified  -MS       Row Name 25 1505          Occupational Profile    Reason for Services/Referral (Occupational Profile) Pt is a 74 y/o F who presented [facility] after mechanical fall, LBP and R hip pain. XR pelvis (-) acute. CT head/cervical spine (-) acute. CT pelvis (-) acute. At baseline, pt resides with significant other in [facility] with 4  EFRA. Pt typically (I) with ADLs, (I) with mobility without AD and drives.  -MS       Row Name 06/22/25 1505          Living Environment    Current Living Arrangements home  -MS     People in Home significant other  -MS       Row Name 06/22/25 1505          Home Main Entrance    Number of Stairs, Main Entrance four  -MS       Row Name 06/22/25 1505          Stairs Within Home, Primary    Number of Stairs, Within Home, Primary none  -MS       Row Name 06/22/25 1505          Cognition    Orientation Status (Cognition) oriented x 4  -MS       Row Name 06/22/25 1505          Safety Issues/Impairments Affecting Functional Mobility    Impairments Affecting Function (Mobility) balance;endurance/activity tolerance;pain;strength  -MS               User Key  (r) = Recorded By, (t) = Taken By, (c) = Cosigned By      Initials Name Provider Type    Tiffani Contreras, OT Occupational Therapist                     Mobility/ADL's       Row Name 06/22/25 1506          Bed Mobility    Bed Mobility supine-sit;sit-supine  -MS     Supine-Sit [place] (Bed Mobility) moderate assist (50% patient effort);2 person assist  -MS     Sit-Supine [place] (Bed Mobility) moderate assist (50% patient effort);2 person assist  -MS     Bed Mobility, Safety Issues decreased use of legs for bridging/pushing  -MS     Assistive Device (Bed Mobility) head of bed elevated;bed rails;repositioning sheet  -MS     Comment, (Bed Mobility) verbal cues, task segmentation  -MS       Row Name 06/22/25 1506          Transfers    Transfers sit-stand transfer  -MS       Row Name 06/22/25 1506          Sit-Stand Transfer    Sit-Stand [place] (Transfers) minimum assist (75% patient effort);2 person assist  -MS     Comment, (Sit-Stand Transfer) charmaine HHA  -MS       Row Name 06/22/25 1506          Activities of Daily Living    BADL Assessment/Intervention upper body dressing  -MS       Row Name 06/22/25 1506          Upper Body Dressing Assessment/Training     [place] Level (Upper Body Dressing) don  -MS     Position (Upper Body Dressing) sitting up in bed  -MS     Comment, (Upper Body Dressing) vamsi martinwderrick, max A  -MS               User Key  (r) = Recorded By, (t) = Taken By, (c) = Cosigned By      Initials Name Provider Type    Tiffani Contreras OT Occupational Therapist                   Obj/Interventions       Row Name 06/22/25 1507          Sensory Assessment (Somatosensory)    Sensory Assessment (Somatosensory) UE sensation intact  -MS       Row Name 06/22/25 1507          Vision Assessment/Intervention    Visual Impairment/Limitations WFL  -MS       Row Name 06/22/25 1507          Range of Motion Comprehensive    General Range of Motion bilateral upper extremity ROM WFL  -MS       Row Name 06/22/25 1507          Strength Comprehensive (MMT)    Comment, General Manual Muscle Testing (MMT) Assessment BUE grossly 3+/5  -MS       Row Name 06/22/25 1507          Balance    Balance Assessment sitting static balance;sitting dynamic balance;standing static balance;standing dynamic balance  -MS     Static Sitting Balance contact guard  -MS     Dynamic Sitting Balance contact guard;minimal assist  -MS     Position, Sitting Balance unsupported;sitting edge of bed  -MS     Static Standing Balance minimal assist  -MS     Dynamic Standing Balance minimal assist;moderate assist  -MS     Position/Device Used, Standing Balance unsupported  HHA  -MS               User Key  (r) = Recorded By, (t) = Taken By, (c) = Cosigned By      Initials Name Provider Type    Tiffani Contreras OT Occupational Therapist                   Goals/Plan       Row Name 06/22/25 1512          Bed Mobility Goal 1 (OT)    Activity/Assistive Device (Bed Mobility Goal 1, OT) bed mobility activities, all  -MS     [place] Level/Cues Needed (Bed Mobility Goal 1, OT) modified independence  -MS     Time Frame (Bed Mobility Goal 1, OT) long term goal (LTG);2 weeks  -MS     Progress/Outcomes (Bed  Mobility Goal 1, OT) new goal  -MS       Row Name 06/22/25 1512          Transfer Goal 1 (OT)    Activity/Assistive Device (Transfer Goal 1, OT) transfers, all  -MS     [place] Level/Cues Needed (Transfer Goal 1, OT) modified independence  -MS     Time Frame (Transfer Goal 1, OT) long term goal (LTG);2 weeks  -MS     Progress/Outcome (Transfer Goal 1, OT) new goal  -MS       Row Name 06/22/25 1512          Dressing Goal 1 (OT)    Activity/Device (Dressing Goal 1, OT) dressing skills, all  -MS     [place]/Cues Needed (Dressing Goal 1, OT) modified independence  -MS     Time Frame (Dressing Goal 1, OT) long term goal (LTG);2 weeks  -MS     Progress/Outcome (Dressing Goal 1, OT) new goal  -MS       Row Name 06/22/25 1512          Toileting Goal 1 (OT)    Activity/Device (Toileting Goal 1, OT) toileting skills, all  -MS     [place] Level/Cues Needed (Toileting Goal 1, OT) modified independence  -MS     Time Frame (Toileting Goal 1, OT) long term goal (LTG);2 weeks  -MS     Progress/Outcome (Toileting Goal 1, OT) new goal  -MS       Row Name 06/22/25 1512          Problem Specific Goal 1 (OT)    Problem Specific Goal 1 (OT) increase activity tolerance needed for ADL routine >5 mintues without rest break  -MS     Time Frame (Problem Specific Goal 1, OT) long term goal (LTG);2 weeks  -MS     Progress/Outcome (Problem Specific Goal 1, OT) new goal  -MS       Row Name 06/22/25 1512          Therapy Assessment/Plan (OT)    Planned Therapy Interventions (OT) activity tolerance training;adaptive equipment training;BADL retraining;IADL retraining;functional balance retraining;occupation/activity based interventions;passive ROM/stretching;patient/caregiver education/training;transfer/mobility retraining;strengthening exercise;ROM/therapeutic exercise  -MS               User Key  (r) = Recorded By, (t) = Taken By, (c) = Cosigned By      Initials Name Provider Type    Tiffani Contreras, OT Occupational Therapist                    Clinical Impression       Row Name 06/22/25 1508          Pain Assessment    Pain Location back;hip  -MS     Pain Side/Orientation right  -MS     Pain Management Interventions nursing notified;positioning techniques utilized  -MS     Additional Documentation Pain Scale: FACES Pre/Post-Treatment (Group)  -MS       Row Name 06/22/25 1508          Pain Scale: FACES Pre/Post-Treatment    Pain: FACES Scale, Pretreatment 4-->hurts little more  -MS     Posttreatment Pain Rating 6-->hurts even more  -MS       Row Name 06/22/25 1508          Plan of Care Review    Plan of Care Reviewed With patient  -MS     Progress no change  -MS     Outcome Evaluation Pt is a 76 y/o F who presented [facility] after mechanical fall, LBP and R hip pain. At baseline, pt resides with significant other in [facility] with 4 EFRA. Pt typically (I) with ADLs, (I) with mobility without AD and drives. Pt cleared for OT by nursing, oriented x4 on 2L O2 sitting up in bed upon arrival. Pt c/o back and R hip pain and nausea, unable to have medications at time of arrival. Pt titrated to RA, O2 >92% throughout, reapplied end of session. Pt requires mod A x2 for bed mobility, limited d/t pain, requiring use of repositioning sheet, verbal cues and task segmentation. Pt comes to standing with charmaine HHA with min A, demo difficulty weight shifting and advancing RLE. Pt will require significant assist with ADL routine and mobility. Once pain is controlled, anticipate will be able to mobilize further with less assist. OT recommend acute IP rehab when medically appropriate for dc to increase (I) to max potential. OT will follow within acute setting and progress as appropriate.  -MS       Row Name 06/22/25 1508          Therapy Assessment/Plan (OT)    Rehab Potential (OT) good  -MS     Criteria for Skilled Therapeutic Interventions Met (OT) yes;meets criteria;skilled treatment is necessary  -MS     Therapy Frequency (OT) 5 times/wk  -MS     Predicted  Duration of Therapy Intervention (OT) until d/c  -MS       Row Name 06/22/25 1508          Therapy Plan Review/Discharge Plan (OT)    Anticipated Discharge Disposition (OT) inpatient rehabilitation facility  -MS       Row Name 06/22/25 1508          Vital Signs    Pre SpO2 (%) 96  -MS     O2 Delivery Pre Treatment nasal cannula  -MS     Intra SpO2 (%) 93  -MS     O2 Delivery Intra Treatment room air  -MS     Post SpO2 (%) 91  -MS     O2 Delivery Post Treatment room air  -MS     Pre Patient Position Supine  -MS     Intra Patient Position Standing  -MS     Post Patient Position Supine  -MS       Row Name 06/22/25 1508          Positioning and Restraints    Pre-Treatment Position in bed  -MS     Post Treatment Position bed  -MS     In Bed notified nsg;supine;call light within reach;encouraged to call for assist;exit alarm on;side rails up x3  -MS               User Key  (r) = Recorded By, (t) = Taken By, (c) = Cosigned By      Initials Name Provider Type    Tiffani Contreras OT Occupational Therapist                   Outcome Measures       Row Name 06/22/25 1512          How much help from another is currently needed...    Putting on and taking off regular lower body clothing? 2  -MS     Bathing (including washing, rinsing, and drying) 2  -MS     Toileting (which includes using toilet bed pan or urinal) 2  -MS     Putting on and taking off regular upper body clothing 2  -MS     Taking care of personal grooming (such as brushing teeth) 3  -MS     Eating meals 4  -MS     AM-PAC 6 Clicks Score (OT) 15  -MS       Row Name 06/22/25 1512          Functional Assessment    Outcome Measure Options AM-PAC 6 Clicks Daily Activity (OT)  -MS               User Key  (r) = Recorded By, (t) = Taken By, (c) = Cosigned By      Initials Name Provider Type    Tiffani Contreras OT Occupational Therapist                    Occupational Therapy Education       Title: PT OT SLP Therapies (Done)       Topic: Occupational Therapy (Done)        Point: ADL training (Done)       Learning Progress Summary            Patient Acceptance, E,TB, VU by MS at 6/22/2025 1512                      Point: Precautions (Done)       Learning Progress Summary            Patient Acceptance, E,TB, VU by MS at 6/22/2025 1512                      Point: Body mechanics (Done)       Learning Progress Summary            Patient Acceptance, E,TB, VU by MS at 6/22/2025 1512                                      User Key       Initials Effective Dates Name Provider Type Discipline    MS 07/13/22 -  Tiffani Castro OT Occupational Therapist OT                  OT Recommendation and Plan  Planned Therapy Interventions (OT): activity tolerance training, adaptive equipment training, BADL retraining, IADL retraining, functional balance retraining, occupation/activity based interventions, passive ROM/stretching, patient/caregiver education/training, transfer/mobility retraining, strengthening exercise, ROM/therapeutic exercise  Therapy Frequency (OT): 5 times/wk  Plan of Care Review  Plan of Care Reviewed With: patient  Progress: no change  Outcome Evaluation: Pt is a 74 y/o F who presented [facility] 6/21/25 after mechanical fall, LBP and R hip pain. At baseline, pt resides with significant other in [facility] with 4 EFRA. Pt typically (I) with ADLs, (I) with mobility without AD and drives. Pt cleared for OT by nursing, oriented x4 on 2L O2 sitting up in bed upon arrival. Pt c/o back and R hip pain and nausea, unable to have medications at time of arrival. Pt titrated to RA, O2 >92% throughout, reapplied end of session. Pt requires mod A x2 for bed mobility, limited d/t pain, requiring use of repositioning sheet, verbal cues and task segmentation. Pt comes to standing with charmaine HHA with min A, demo difficulty weight shifting and advancing RLE. Pt will require significant assist with ADL routine and mobility. Once pain is controlled, anticipate will be able to mobilize further with less assist. OT  recommend acute IP rehab when medically appropriate for dc to increase (I) to max potential. OT will follow within acute setting and progress as appropriate.     Time Calculation:         Time Calculation- OT       Row Name 06/22/25 1512             Time Calculation- OT    OT Start Time 1336  -MS      OT Stop Time 1356  -MS      OT Time Calculation (min) 20 min  -MS      Total Timed Code Minutes- OT 0 minute(s)  -MS      OT Received On 06/22/25  -MS      OT - Next Appointment 06/23/25  -MS      OT Goal Re-Cert Due Date 07/06/25  -MS                User Key  (r) = Recorded By, (t) = Taken By, (c) = Cosigned By      Initials Name Provider Type    MS Tiffani Castro OT Occupational Therapist                  Therapy Charges for Today       Code Description Service Date Service Provider Modifiers Qty    [id number] HC OT EVAL MOD COMPLEXITY 4 6/22/2025 Tiffani Castro OT GO 1                 Tiffani Castro OT  6/22/2025

## 2025-06-22 NOTE — PLAN OF CARE
Goal Outcome Evaluation:  Plan of Care Reviewed With: patient           Outcome Evaluation: Pt is a 74 y/o F who presented to [facility] 6/21/25 after mechanical fall, LBP and R hip pain. At baseline, pt resides with significant other in [facility] with 4 EFRA. Imaging (-) for any acute frx. [facility] with ADLs and ambulates without AD. Pt this date experiencing significant pain in R side and nauseated throughout. Pt requiring MOD A x2 to come to sitting EOB and MIN A x2 to stand and ambulate short distance with Johnny HHA. Pt will benefit from RWx next treatment. Pt is well below independetn baseline and reocmmendation is IP rehab at d/c.    Anticipated Discharge Disposition (PT): inpatient rehabilitation facility

## 2025-06-22 NOTE — H&P
[facility] Medicine Services    Hospitalist History and Physical     Yaneli Hernandez : 1950 MRN:[id number] LOS:1 ROOM: 380/1     Reason for admission: Low back pain radiating to right lower extremity     Assessment / Plan     Right lower back pain  Right posterior hip pain  Fall down hill 4 days prior to admission   Impaired mobility/weakness secondary to all above   - CT pelvis: No acute osseous abnormality. Mild bilateral hip arthritis. Mild right SI joint arthritis. Partially visualized lumbar spondylosis.   - CT head: No acute intracranial abnormality is identified. Sphenoid sinus disease. No acute cervical spine fracture is identified. Minimal grade 1 anterolisthesis of C4 on C5. Multilevel mild to moderate degenerative changes.   - CT cervical spine: no acute intracranial abnormality is identified. Sphenoid sinus disease. No acute cervical spine fracture is identified. Minimal grade 1 anterolisthesis of C4 on C5. Multilevel mild to moderate degenerative changes  - WBC 12.84, hgb 11.6, K 3.4  - UA pending  - given 500cc IVFs and IV morphine 4mg in the ER  - difficulty ambulating in the ER with 2 person assist  - PT/OT eval, pain control    COPD  - not in exacerbation    Bradycardia  - HR 50s-60s, baseline    HLD  - cont home statin    Anxiety/depression  - cont home xanax, lexapro      Nutrition:   Diet: Regular/House; Fluid Consistency: Thin (IDDSI 0)     VTE Prophylaxis:  Mechanical VTE prophylaxis orders are present.         History of Present illness     Yaneli Hernandez is a 75 y.o. female with PMH of COPD, bradycardia, HLD, anxiety/depression, headaches presented to [facility] ED 2025 with complaints of right posterior hip pain and right lower back pain since falling 4 days ago. She stated she was walking in her yard trying to show someone where the water meter was and she ended up falling and rolling down her hills. She was able to get up and walk to the house but she has had right  posterior hip pain and right lower back pain since that time so she has stayed in bed for the last 4 days. Her  has been bringing her meals in bed. She normally walks independently and often walks on the track at the [facility]. EMS was called.     In the ED CT pelvis showed No acute osseous abnormality. Mild bilateral hip arthritis. Mild right SI joint arthritis. Partially visualized lumbar spondylosis. CT head showed No acute intracranial abnormality is identified. Sphenoid sinus disease. No acute cervical spine fracture is identified. Minimal grade 1 anterolisthesis of C4 on C5. Multilevel mild to moderate degenerative changes.   CT cervical spine showed no acute intracranial abnormality is identified. Sphenoid sinus disease. No acute cervical spine fracture is identified. Minimal grade 1 anterolisthesis of C4 on C5. Multilevel mild to moderate degenerative changes. WBC 12.84, hgb 11.6, K 3.4. She was given 500cc IVFs and IV morphine 4mg. She had received morphine via EMS. She was ambulated in the ER and was a 2 person assist and was in significant pain. She was admitted for further treatment of right hip/lower back pain.    Subjective / Review of systems     Review of Systems   HENT: Negative.     Eyes: Negative.    Respiratory: Negative.     Cardiovascular: Negative.    Gastrointestinal: Negative.    Genitourinary: Negative.    Musculoskeletal:         Right posterior hip pain   Skin: Negative.    Neurological:  Positive for weakness.        Fall 4 days ago down a hill  Right posterior hip pain  Has not been able to get out of bed for 4 days due to pain   Psychiatric/Behavioral: Negative.          Past Medical/Surgical/Social/Family History & Allergies     Past Medical History:   Diagnosis Date    Anxiety     Depression     Elevated cholesterol     Headache       Past Surgical History:   Procedure Laterality Date    SHOULDER ARTHROSCOPY Left     ROTATOR CUFF REPAIR    SHOULDER ARTHROSCOPY W/ ROTATOR CUFF  REPAIR Right 8/24/2016    Procedure: RT SHOULDER MANIPULATION, RT SHOULDER ARTHROSCOPY WITH ROTATOR CUFF REPAIR, BANKART REPAIR  AND ACROMIOPLASTY ;  Surgeon: Francesco Wall MD;  [place] [facility] [place] [facility] [place]:       Social History     Socioeconomic History    Marital status:    Tobacco Use    Smoking status: Never    Smokeless tobacco: Never   Vaping Use    Vaping status: Never Used   Substance and Sexual Activity    Alcohol use: No    Drug use: No    Sexual activity: Not Currently      Family History   Problem Relation Age of Onset    Heart failure Mother       Allergies   Allergen Reactions    Topamax [Topiramate] Hives and Other (See Comments)     CAUSED KIDNEY STONES    Codeine Hives and Itching      Social Drivers of Health     Tobacco Use: Low Risk  (6/22/2025)    Patient History     Smoking Tobacco Use: Never     Smokeless Tobacco Use: Never     Passive Exposure: Not on file   Alcohol Use: Not At Risk (6/22/2025)    AUDIT-C     Frequency of Alcohol Consumption: Never     Average Number of Drinks: Patient does not drink     Frequency of Binge Drinking: Never   Financial Resource Strain: Not on file   Food Insecurity: Not on file   Transportation Needs: Not on file   Physical Activity: Not on file   Stress: Not on file   Social Connections: Not on file   Interpersonal Safety: Not At Risk (6/22/2025)    Abuse Screen     Unsafe at Home or Work/School: no     Feels Threatened by Someone?: no     Does Anyone Keep You from Contacting Others or Doint Things Outside the Home?: no     Physical Sign of Abuse Present: no   Depression: Not on file   Housing Stability: Unknown (6/22/2025)    Housing Stability     Current Living Arrangements: home     Potentially Unsafe Housing Conditions: Not on file   Utilities: Not on file   Health Literacy: Not on file   Employment: Not on file   Disabilities: Not At Risk (6/22/2025)    Disabilities     Concentrating, Remembering, or Making Decisions Difficulty: no      Doing Errands Independently Difficulty: no        Home Medications     Prior to Admission medications    Medication Sig Start Date End Date Taking? Authorizing Provider   ALPRAZolam (XANAX) 2 MG tablet Take 2 mg by mouth 3 (three) times a day as needed. 6/24/16   Tawanda Rivera MD   escitalopram (LEXAPRO) 10 MG tablet  10/11/22   Tawanda Rivera MD   hydrOXYzine (ATARAX) 25 MG tablet Take 1 tablet by mouth 3 (Three) Times a Day As Needed for Itching. 9/4/23   Jessica Garcia APRN   oxybutynin XL (DITROPAN XL) 15 MG 24 hr tablet  10/5/22   Tawanda Rivera MD   predniSONE (DELTASONE) 20 MG tablet Take 1 tablet by mouth Daily. 9/4/23   Jessica Garcia APRN   rosuvastatin (CRESTOR) 10 MG tablet  4/13/18   Tawanda Rivera MD        Objective / Physical Exam     Vital signs:  Temp: 97.7 °F (36.5 °C)  BP: 122/56  Heart Rate: 63  Resp: 19  SpO2: 96 %  Weight: 64 kg (141 lb 1.5 oz)    Admission Weight: Weight: 64 kg (141 lb 1.5 oz)    Physical Exam  Vitals and nursing note reviewed.   HENT:      Head: Normocephalic and atraumatic.   Eyes:      Extraocular Movements: Extraocular movements intact.      Pupils: Pupils are equal, round, and reactive to light.   Cardiovascular:      Rate and Rhythm: Normal rate and regular rhythm.      Pulses: Normal pulses.      Heart sounds: Normal heart sounds.   Pulmonary:      Effort: Pulmonary effort is normal.      Breath sounds: Normal breath sounds.   Abdominal:      General: Bowel sounds are normal.      Palpations: Abdomen is soft.      Tenderness: There is no abdominal tenderness.   Musculoskeletal:      Comments: Patient can move all extremities in the bed  Pain to right SI region and lower back with movement of right leg   Skin:     General: Skin is warm and dry.   Neurological:      Mental Status: She is alert and oriented to person, place, and time.   Psychiatric:         Mood and Affect: Mood normal.         Behavior: Behavior normal.           Labs     Results from last 7 days   Lab Units 06/21/25 2032   WBC 10*3/mm3 12.84*   HEMOGLOBIN g/dL 11.6*   HEMATOCRIT % 37.0   PLATELETS 10*3/mm3 154      Results from last 7 days   Lab Units 06/21/25  1848   ALK PHOS U/L 67   AST (SGOT) U/L 19   ALT (SGPT) U/L 6           Results from last 7 days   Lab Units 06/21/25  1848   SODIUM mmol/L 145   POTASSIUM mmol/L 3.4*   CHLORIDE mmol/L 109*   CO2 mmol/L 24.1   BUN mg/dL 14.8   CREATININE mg/dL 0.86   GLUCOSE mg/dL 98        Imaging     CT Pelvis Without Contrast  Result Date: 6/21/2025  CT PELVIS WO CONTRAST Date of Exam: 6/21/2025 7:45 PM EDT Indication: Evaluate for pelvic and hip injury after fall; pt cannot ambulate. Comparison: Same day radiographs Technique: Axial CT images were obtained of the pelvis without contrast administration.  Sagittal and coronal reconstructions were performed.  Automated exposure control and iterative reconstruction methods were used. Findings: No evidence of acute fracture. Normal pelvic bone alignment. There is mild bilateral hip arthritis. Degenerative changes of the pubic symphysis. Mild right SI joint arthritis. Partially visualized lumbar spondylosis. No acute osseous abnormality. Pelvic wall soft tissues and included intrapelvic structures show no acute abnormality.     Impression: 1.No acute osseous abnormality. 2.Mild bilateral hip arthritis. 3.Mild right SI joint arthritis. 4.Partially visualized lumbar spondylosis. Electronically Signed: Shai Andrews MD  6/21/2025 8:24 PM EDT  Workstation ID: [id number]    XR Pelvis 1 or 2 View  Result Date: 6/21/2025  XR PELVIS 1 OR 2 VW Date of Exam: 6/21/2025 4:34 PM EDT Indication: fall; pain in R gluteal area Comparison: 2016 CT abdomen/pelvis Findings: No evidence of acute fracture. Normal pelvic bone alignment. Mild bilateral hip arthritis. Partially visualized lumbar spondylosis. Bilateral SI joint and pubic symphysis degenerative change. Soft tissues are unremarkable.      Impression: 1.No evidence of acute fracture. 2.Mild bilateral hip arthritis. Electronically Signed: Shai Andrews MD  6/21/2025 5:08 PM EDT  Workstation ID: [id number]    CT Head Without Contrast  Result Date: 6/21/2025  CT HEAD WO CONTRAST, CT CERVICAL SPINE WO CONTRAST Date of Exam: 6/21/2025 4:35 PM EDT Indication: fall with possible head impact and LOC Comparison: CT head 11/22/2024 Technique: Axial CT images were obtained of the head, cervical spine without contrast administration.  Coronal reconstructions were performed.  Automated exposure control and iterative reconstruction methods were used. Findings: Head: No acute intracranial hemorrhage, mass, or mass effect is seen. Ventricular size is normal. Gray-white matter differentiation appears maintained. Partial opacification of bilateral mastoid air cells appear stable. There is mucosal thickening in the sphenoid sinus. No acute osseous abnormality is identified. Cervical spine: No acute fracture is identified. Minimal grade 1 anterolisthesis of C4 on C5 measures 2 mm. There is multilevel disc space narrowing and facet arthropathy. No abnormal prevertebral soft tissue swelling is seen. Included lung apices are clear.     Impression: 1.No acute intracranial abnormality is identified. 2.Sphenoid sinus disease. 3.No acute cervical spine fracture is identified. 4.Minimal grade 1 anterolisthesis of C4 on C5. 5.Multilevel mild to moderate degenerative changes. Electronically Signed: Rebecca Zamora MD  6/21/2025 4:56 PM EDT  Workstation ID: [id number]    CT Cervical Spine Without Contrast  Result Date: 6/21/2025  CT HEAD WO CONTRAST, CT CERVICAL SPINE WO CONTRAST Date of Exam: 6/21/2025 4:35 PM EDT Indication: fall with possible head impact and LOC Comparison: CT head 11/22/2024 Technique: Axial CT images were obtained of the head, cervical spine without contrast administration.  Coronal reconstructions were performed.  Automated exposure control and iterative  reconstruction methods were used. Findings: Head: No acute intracranial hemorrhage, mass, or mass effect is seen. Ventricular size is normal. Gray-white matter differentiation appears maintained. Partial opacification of bilateral mastoid air cells appear stable. There is mucosal thickening in the sphenoid sinus. No acute osseous abnormality is identified. Cervical spine: No acute fracture is identified. Minimal grade 1 anterolisthesis of C4 on C5 measures 2 mm. There is multilevel disc space narrowing and facet arthropathy. No abnormal prevertebral soft tissue swelling is seen. Included lung apices are clear.     Impression: 1.No acute intracranial abnormality is identified. 2.Sphenoid sinus disease. 3.No acute cervical spine fracture is identified. 4.Minimal grade 1 anterolisthesis of C4 on C5. 5.Multilevel mild to moderate degenerative changes. Electronically Signed: Rebecca Zamora MD  6/21/2025 4:56 PM EDT  Workstation ID: [id number]         Current Medications     Scheduled Meds:  ondansetron, 4 mg, Intravenous, Once  sodium chloride, 10 mL, Intravenous, Q12H           [facility]  06/22/25   00:31 EDT

## 2025-06-22 NOTE — THERAPY EVALUATION
Patient Name: Yaneli Hernandez  : 1950    MRN: [id number]                              Today's Date: 2025       Admit Date: 2025    Visit Dx:     ICD-10-CM ICD-9-CM   1. Fall, initial encounter  W19.XXXA E888.9   2. Pain of right hip  M25.551 719.45   3. Pelvic pain  R10.2 [id number]     Patient Active Problem List   Diagnosis    Chronic kidney disease, stage 3a    Hyperlipidemia    Palpitations    Syncope and collapse    Low back pain radiating to right lower extremity    Fall    Pain of right hip    Impaired mobility     Past Medical History:   Diagnosis Date    Anxiety     Depression     Elevated cholesterol     Headache      Past Surgical History:   Procedure Laterality Date    SHOULDER ARTHROSCOPY Left     ROTATOR CUFF REPAIR    SHOULDER ARTHROSCOPY W/ ROTATOR CUFF REPAIR Right 2016    Procedure: RT SHOULDER MANIPULATION, RT SHOULDER ARTHROSCOPY WITH ROTATOR CUFF REPAIR, BANKART REPAIR  AND ACROMIOPLASTY ;  Surgeon: Francesco Wall MD;  Location: [facility];  Service:       General Information       Row Name 25          Physical Therapy Time and Intention    Document Type evaluation  -EL     Mode of Treatment physical therapy  -EL       Row Name 25          General Information    Prior Level of Function independent:;all household mobility;ADL's  -EL     Existing Precautions/Restrictions fall;oxygen therapy device and L/min  -EL       Row Name 25          Living Environment    Current Living Arrangements home  -EL     People in Home significant other  -EL       Row Name 25 183          Home Main Entrance    Number of Stairs, Main Entrance four  -EL       Row Name 25          Cognition    Orientation Status (Cognition) oriented x 4  -EL       Row Name 25          Safety Issues/Impairments Affecting Functional Mobility    Impairments Affecting Function (Mobility) balance;endurance/activity tolerance;pain;strength  -EL                User Key  (r) = Recorded By, (t) = Taken By, (c) = Cosigned By      Initials Name Provider Type    Tyrone Mack PT Physical Therapist                   Mobility       Row Name 06/22/25 1837          Bed Mobility    Bed Mobility supine-sit;sit-supine  -EL     Supine-Sit [place] (Bed Mobility) moderate assist (50% patient effort);2 person assist  -EL     Sit-Supine [place] (Bed Mobility) moderate assist (50% patient effort);2 person assist  -EL     Assistive Device (Bed Mobility) head of bed elevated;bed rails;repositioning sheet  -EL       Row Name 06/22/25 1837          Sit-Stand Transfer    Sit-Stand [place] (Transfers) minimum assist (75% patient effort);2 person assist  -EL     Comment, (Sit-Stand Transfer) Johnny HHA  -EL       Row Name 06/22/25 1837          Gait/Stairs (Locomotion)    [place] Level (Gait) minimum assist (75% patient effort);2 person assist  -EL     Patient was able to Ambulate yes  -EL     Distance in Feet (Gait) 3  -EL     Comment, (Gait/Stairs) difficulty weight shifting to R side to advance LLE  -EL               User Key  (r) = Recorded By, (t) = Taken By, (c) = Cosigned By      Initials Name Provider Type    Tyrone Mack PT Physical Therapist                   Obj/Interventions       Row Name 06/22/25 1839          Range of Motion Comprehensive    General Range of Motion bilateral lower extremity ROM WFL  -EL       Row Name 06/22/25 1839          Strength Comprehensive (MMT)    General Manual Muscle Testing (MMT) Assessment lower extremity strength deficits identified  -EL     Comment, General Manual Muscle Testing (MMT) Assessment RLE 3/5 gross functionally, LLE 4-/5 gross  -EL       Row Name 06/22/25 1839          Sensory Assessment (Somatosensory)    Sensory Assessment (Somatosensory) sensation intact  -EL               User Key  (r) = Recorded By, (t) = Taken By, (c) = Cosigned By      Initials Name Provider Type    Tyrone Mack PT Physical Therapist                    Goals/Plan       Row Name 06/22/25 1850          Bed Mobility Goal 1 (PT)    Activity/Assistive Device (Bed Mobility Goal 1, PT) bed mobility activities, all  -EL     [place] Level/Cues Needed (Bed Mobility Goal 1, PT) modified independence  -EL     Time Frame (Bed Mobility Goal 1, PT) long term goal (LTG);2 weeks  -EL       Row Name 06/22/25 1850          Transfer Goal 1 (PT)    Activity/Assistive Device (Transfer Goal 1, PT) transfers, all;walker, rolling  -EL     [place] Level/Cues Needed (Transfer Goal 1, PT) modified independence  -EL     Time Frame (Transfer Goal 1, PT) long term goal (LTG);2 weeks  -EL       Row Name 06/22/25 1850          Gait Training Goal 1 (PT)    Activity/Assistive Device (Gait Training Goal 1, PT) gait (walking locomotion);walker, rolling  -EL     [place] Level (Gait Training Goal 1, PT) modified independence  -EL     Distance (Gait Training Goal 1, PT) 150  -EL     Time Frame (Gait Training Goal 1, PT) long term goal (LTG);2 weeks  -EL       Row Name 06/22/25 1850          Therapy Assessment/Plan (PT)    Planned Therapy Interventions (PT) balance training;neuromuscular re-education;bed mobility training;transfer training;gait training;patient/family education;strengthening  -EL               User Key  (r) = Recorded By, (t) = Taken By, (c) = Cosigned By      Initials Name Provider Type    Tyrone Mack, PT Physical Therapist                   Clinical Impression       Row Name 06/22/25 1840          Pain    Pain Location back;hip  -EL     Pain Side/Orientation right  -EL       Row Name 06/22/25 1840          Pain Scale: FACES Pre/Post-Treatment    Pain: FACES Scale, Pretreatment 4-->hurts little more  -EL     Posttreatment Pain Rating 6-->hurts even more  -EL       Row Name 06/22/25 1840          Plan of Care Review    Plan of Care Reviewed With patient  -EL     Outcome Evaluation Pt is a 74 y/o F who presented to [facility] 6/21/25 after mechanical fall, LBP and R  hip pain. At baseline, pt resides with significant other in [facility] with 4 EFRA. Imaging (-) for any acute frx. Pt reports living at home wiht SO, where she is generally independent with ADLs and ambulates without AD. Pt this date experiencing significant pain in R side and nauseated throughout. Pt requiring MOD A x2 to come to sitting EOB and MIN A x2 to stand and ambulate short distance with Johnny HHA. Pt will benefit from RWx next treatment. Pt is well below independetn baseline and reocmmendation is IP rehab at d/c.  -EL       Row Name 06/22/25 1840          Therapy Assessment/Plan (PT)    Rehab Potential (PT) good  -EL     Criteria for Skilled Interventions Met (PT) yes  -EL     Therapy Frequency (PT) 5 times/wk  -EL     Predicted Duration of Therapy Intervention (PT) until d/c  -EL       Row Name 06/22/25 1840          Vital Signs    O2 Delivery Pre Treatment supplemental O2  -EL     O2 Delivery Intra Treatment room air  -EL     O2 Delivery Post Treatment supplemental O2  -EL     Pre Patient Position Supine  -EL     Intra Patient Position Standing  -EL     Post Patient Position Sitting  -EL       Row Name 06/22/25 1840          Positioning and Restraints    Pre-Treatment Position in bed  -EL     Post Treatment Position bed  -EL     In Bed notified nsg;supine;call light within reach;encouraged to call for assist;exit alarm on  -EL               User Key  (r) = Recorded By, (t) = Taken By, (c) = Cosigned By      Initials Name Provider Type    Tyrone Mack, PT Physical Therapist                   Outcome Measures       Row Name 06/22/25 1850          How much help from another person do you currently need...    Turning from your back to your side while in flat bed without using bedrails? 3  -EL     Moving from lying on back to sitting on the side of a flat bed without bedrails? 2  -EL     Moving to and from a bed to a chair (including a wheelchair)? 2  -EL     Standing up from a chair using your arms (e.g., wheelchair,  bedside chair)? 2  -EL     Climbing 3-5 steps with a railing? 1  -EL     To walk in hospital room? 1  -EL     AM-PAC 6 Clicks Score (PT) 11  -EL     Highest Level of Mobility Goal Move to Chair/Commode-4  -EL       Row Name 06/22/25 1850 06/22/25 1512       Functional Assessment    Outcome Measure Options AM-PAC 6 Clicks Basic Mobility (PT)  -EL AM-PAC 6 Clicks Daily Activity (OT)  -MS              User Key  (r) = Recorded By, (t) = Taken By, (c) = Cosigned By      Initials Name Provider Type    Tyrone Mack, PT Physical Therapist    MS Tiffani Castro, OT Occupational Therapist                                 Physical Therapy Education       Title: PT OT SLP Therapies (Done)       Topic: Physical Therapy (Done)       Point: Mobility training (Done)       Learning Progress Summary            Patient Acceptance, E,TB, VU by  at 6/22/2025 1850                      Point: Precautions (Done)       Learning Progress Summary            Patient Acceptance, E,TB, VU by  at 6/22/2025 1850                                      User Key       Initials Effective Dates Name Provider Type Discipline     06/23/20 -  Tyrone Hart, PT Physical Therapist PT                  PT Recommendation and Plan  Planned Therapy Interventions (PT): balance training, neuromuscular re-education, bed mobility training, transfer training, gait training, patient/family education, strengthening  Outcome Evaluation: Pt is a 76 y/o F who presented to [facility]/21[facility]25 after mechanical fall, LBP and R hip pain. At baseline, pt resides with significant other in [facility] with 4 EFRA. Imaging (-) for any acute frx. Pt reports living at home [facility], where she is generally independent with ADLs and ambulates without AD. Pt this date experiencing significant pain in R side and nauseated throughout. Pt requiring MOD A x2 to come to sitting EOB and MIN A x2 to stand and ambulate short distance with Johnny HHA. Pt will benefit from RWx next treatment. Pt is well below  independetn baseline and reocmmendation is IP rehab at d/c.     Time Calculation:   PT Evaluation Complexity  History, PT Evaluation Complexity: 1-2 personal factors and/or comorbidities  Examination of Body Systems (PT Eval Complexity): total of 3 or more elements  Clinical Presentation (PT Evaluation Complexity): evolving  Clinical Decision Making (PT Evaluation Complexity): moderate complexity  Overall Complexity (PT Evaluation Complexity): moderate complexity     PT Charges       Row Name 06/22/25 1851             Time Calculation    Start Time 1335  -EL      Stop Time 1355  -EL      Time Calculation (min) 20 min  -EL      PT Received On 06/22/25  -EL      PT - Next Appointment 06/23/25  -EL      PT Goal Re-Cert Due Date 07/06/25  -EL                User Key  (r) = Recorded By, (t) = Taken By, (c) = Cosigned By      Initials Name Provider Type    Tyrone Mack PT Physical Therapist                  Therapy Charges for Today       Code Description Service Date Service Provider Modifiers Qty    61136263299 HC PT EVAL MOD COMPLEXITY 4 6/22/2025 Tyrone Hart PT GP 1            PT G-Codes  Outcome Measure Options: AM-PAC 6 Clicks Basic Mobility (PT)  AM-PAC 6 Clicks Score (PT): 11  AM-PAC 6 Clicks Score (OT): 15  PT Discharge Summary  Anticipated Discharge Disposition (PT): inpatient rehabilitation facility    Tyrone Hart PT  6/22/2025

## 2025-06-22 NOTE — PROGRESS NOTES
[facility] MEDICINE SERVICE  DAILY PROGRESS NOTE    NAME: Yaneli Hernandez  : 1950  MRN: [id number]      LOS: 1 day     PROVIDER OF SERVICE: Nasra Weaver MD    Chief Complaint: Low back pain radiating to right lower extremity    Subjective:   HNP, labs and imaging reviewed.    Interval History:    Patient seen and evaluated at bedside.   C/o HA, 10/10 and rt back pain  Treatment plan discussed with patient. All questions addressed.     Review of Systems:   All 21 ROS were negative except mentioned above.    Objective:     Vital Signs  Temp:  [97.6 °F (36.4 °C)-97.7 °F (36.5 °C)] 97.7 °F (36.5 °C)  Heart Rate:  [50-73] 55  Resp:  [16-20] 16  BP: (114-145)/(47-82) 130/62  Flow (L/min) (Oxygen Therapy):  [2-3] 2   Body mass index is 24.22 kg/m².    Physical Exam   General: No acute distress, appears stated age  Neuro: Awake and alert, oriented x3, no focal deficits appreciated  Head: Atraumatic, normocephalic  HEENT: EOMI, anicteric, normal sclerae and conjunctivae, moist mucus membranes  Neck: supple, no lymphadenopathy  CV: RRR, soft heart sounds, no murmurs appreciated, no peripheral edema  Pulm: Decreased breath sounds, no increased work of breathing, no adventitious sounds  Abd: Soft, nontender, nondistended, right CVA angle tenderness  Skin: Warm, dry and intact  Psych: Appropriate mood and affect    Scheduled Meds   escitalopram, 20 mg, Oral, Daily  ondansetron, 4 mg, Intravenous, Once  oxybutynin XL, 15 mg, Oral, Daily  rosuvastatin, 20 mg, Oral, Daily  senna-docusate sodium, 2 tablet, Oral, BID  sodium chloride, 10 mL, Intravenous, Q12H       PRN Meds     acetaminophen **OR** acetaminophen **OR** acetaminophen    ALPRAZolam    aluminum-magnesium hydroxide-simethicone    senna-docusate sodium **AND** polyethylene glycol **AND** bisacodyl **AND** bisacodyl    melatonin    Morphine    ondansetron    sodium chloride    sodium chloride   Infusions         Diagnostic Data    Results from last 7 days    Lab Units 06/22/25  0512 06/21/25 2032 06/21/25  1848   WBC 10*3/mm3 11.04*   < >  --    HEMOGLOBIN g/dL 13.3   < >  --    HEMATOCRIT % 42.6   < >  --    PLATELETS 10*3/mm3 225   < >  --    GLUCOSE mg/dL 126*  --  98   CREATININE mg/dL 0.92  --  0.86   BUN mg/dL 16.6  --  14.8   SODIUM mmol/L 144  --  145   POTASSIUM mmol/L 4.6  --  3.4*   AST (SGOT) U/L  --   --  19   ALT (SGPT) U/L  --   --  6   ALK PHOS U/L  --   --  67   BILIRUBIN mg/dL  --   --  0.7   ANION GAP mmol/L 10.2  --  11.9    < > = values in this interval not displayed.       CT Pelvis Without Contrast  Result Date: 6/21/2025  Impression: 1.No acute osseous abnormality. 2.Mild bilateral hip arthritis. 3.Mild right SI joint arthritis. 4.Partially visualized lumbar spondylosis. Electronically Signed: Shai Andrews MD  6/21/2025 8:24 PM EDT  Workstation ID: [id number]    XR Pelvis 1 or 2 View  Result Date: 6/21/2025  Impression: 1.No evidence of acute fracture. 2.Mild bilateral hip arthritis. Electronically Signed: Shai Andrews MD  6/21/2025 5:08 PM EDT  Workstation ID: [id number]    CT Head Without Contrast  Result Date: 6/21/2025  Impression: 1.No acute intracranial abnormality is identified. 2.Sphenoid sinus disease. 3.No acute cervical spine fracture is identified. 4.Minimal grade 1 anterolisthesis of C4 on C5. 5.Multilevel mild to moderate degenerative changes. Electronically Signed: Rebecca Zamora MD  6/21/2025 4:56 PM EDT  Workstation ID: [id number]    CT Cervical Spine Without Contrast  Result Date: 6/21/2025  Impression: 1.No acute intracranial abnormality is identified. 2.Sphenoid sinus disease. 3.No acute cervical spine fracture is identified. 4.Minimal grade 1 anterolisthesis of C4 on C5. 5.Multilevel mild to moderate degenerative changes. Electronically Signed: Rebecca Zamora MD  6/21/2025 4:56 PM EDT  Workstation ID: [id number]      Interval results reviewed.    Assessment/Plan:   Right lower back pain  Acute sphenoid sinusitis  DJD  Status post  fall  Impaired mobility  COPD-not in exacerbation  Bradycardia  Hyperlipidemia  Major depression and anxiety  Headache    Urine culture is pending white blood count trending down.    X-rays of pelvis and CT cervical spine and pelvis with no acute fractures, bilateral hip arthritis, grade 1 anterolisthesis of C4 on C5 and moderate DJD of cervical spine, mild right SI joint arthritis and lumbar spondylosis.    Likely due to sphenoid sinusitis start on Augmentin 875 twice daily for 10 days    PT OT    Patient will benefit from 30 days or less of SNF    Treatment plan discussed with RN.     VTE Prophylaxis:  Mechanical VTE prophylaxis orders are present.         Code status is   Code Status and Medical Interventions: CPR (Attempt to Resuscitate); Full Support   Ordered at: 06/21/25 7194     Code Status (Patient has no pulse and is not breathing):    CPR (Attempt to Resuscitate)     Medical Interventions (Patient has pulse or is breathing):    Full Support     Level Of Support Discussed With:    Patient       Plan for disposition:     Barriers to Discharge: PT OT evaluation, pending urine culture  Anticipated Date of Discharge: 6/23/25  Place of Discharge: SNF      Time: 40 minutes     Signature: Electronically signed by Nasra Weaver MD, 06/22/25, 08:41 EDT.  [facility] Hospitalist Team

## 2025-06-23 LAB
ALBUMIN SERPL-MCNC: 3.6 G/DL (ref 3.5–5.2)
ALBUMIN/GLOB SERPL: 1.4 G/DL
ALP SERPL-CCNC: 66 U/L (ref 39–117)
ALT SERPL W P-5'-P-CCNC: <5 U/L (ref 1–33)
ANION GAP SERPL CALCULATED.3IONS-SCNC: 9.8 MMOL/L (ref 5–15)
AST SERPL-CCNC: 12 U/L (ref 1–32)
BACTERIA SPEC AEROBE CULT: NORMAL
BASOPHILS # BLD AUTO: 0.07 10*3/MM3 (ref 0–0.2)
BASOPHILS NFR BLD AUTO: 0.6 % (ref 0–1.5)
BILIRUB SERPL-MCNC: 0.6 MG/DL (ref 0–1.2)
BUN SERPL-MCNC: 13.1 MG/DL (ref 8–23)
BUN/CREAT SERPL: 16.2 (ref 7–25)
CALCIUM SPEC-SCNC: 9.2 MG/DL (ref 8.6–10.5)
CHLORIDE SERPL-SCNC: 99 MMOL/L (ref 98–107)
CO2 SERPL-SCNC: 25.2 MMOL/L (ref 22–29)
CREAT SERPL-MCNC: 0.81 MG/DL (ref 0.57–1)
DEPRECATED RDW RBC AUTO: 44.9 FL (ref 37–54)
EGFRCR SERPLBLD CKD-EPI 2021: 76.3 ML/MIN/1.73
EOSINOPHIL # BLD AUTO: 0.03 10*3/MM3 (ref 0–0.4)
EOSINOPHIL NFR BLD AUTO: 0.2 % (ref 0.3–6.2)
ERYTHROCYTE [DISTWIDTH] IN BLOOD BY AUTOMATED COUNT: 12.6 % (ref 12.3–15.4)
GLOBULIN UR ELPH-MCNC: 2.6 GM/DL
GLUCOSE SERPL-MCNC: 108 MG/DL (ref 65–99)
HCT VFR BLD AUTO: 42.6 % (ref 34–46.6)
HGB BLD-MCNC: 13.7 G/DL (ref 12–15.9)
IMM GRANULOCYTES # BLD AUTO: 0.08 10*3/MM3 (ref 0–0.05)
IMM GRANULOCYTES NFR BLD AUTO: 0.7 % (ref 0–0.5)
LYMPHOCYTES # BLD AUTO: 2.1 10*3/MM3 (ref 0.7–3.1)
LYMPHOCYTES NFR BLD AUTO: 17.1 % (ref 19.6–45.3)
MCH RBC QN AUTO: 31 PG (ref 26.6–33)
MCHC RBC AUTO-ENTMCNC: 32.2 G/DL (ref 31.5–35.7)
MCV RBC AUTO: 96.4 FL (ref 79–97)
MONOCYTES # BLD AUTO: 0.89 10*3/MM3 (ref 0.1–0.9)
MONOCYTES NFR BLD AUTO: 7.3 % (ref 5–12)
NEUTROPHILS NFR BLD AUTO: 74.1 % (ref 42.7–76)
NEUTROPHILS NFR BLD AUTO: 9.1 10*3/MM3 (ref 1.7–7)
NRBC BLD AUTO-RTO: 0 /100 WBC (ref 0–0.2)
PLATELET # BLD AUTO: 241 10*3/MM3 (ref 140–450)
PMV BLD AUTO: 10.7 FL (ref 6–12)
POTASSIUM SERPL-SCNC: 3.9 MMOL/L (ref 3.5–5.2)
PROT SERPL-MCNC: 6.2 G/DL (ref 6–8.5)
RBC # BLD AUTO: 4.42 10*6/MM3 (ref 3.77–5.28)
SODIUM SERPL-SCNC: 134 MMOL/L (ref 136–145)
WBC NRBC COR # BLD AUTO: 12.27 10*3/MM3 (ref 3.4–10.8)

## 2025-06-23 PROCEDURE — 97116 GAIT TRAINING THERAPY: CPT

## 2025-06-23 PROCEDURE — 97530 THERAPEUTIC ACTIVITIES: CPT

## 2025-06-23 PROCEDURE — 25010000002 CEFTRIAXONE PER 250 MG: Performed by: INTERNAL MEDICINE

## 2025-06-23 PROCEDURE — 80053 COMPREHEN METABOLIC PANEL: CPT | Performed by: INTERNAL MEDICINE

## 2025-06-23 PROCEDURE — 85025 COMPLETE CBC W/AUTO DIFF WBC: CPT | Performed by: INTERNAL MEDICINE

## 2025-06-23 PROCEDURE — 25010000002 MORPHINE PER 10 MG: Performed by: INTERNAL MEDICINE

## 2025-06-23 RX ORDER — ONDANSETRON 2 MG/ML
4 INJECTION INTRAMUSCULAR; INTRAVENOUS EVERY 6 HOURS PRN
Status: DISCONTINUED | OUTPATIENT
Start: 2025-06-23 | End: 2025-06-24 | Stop reason: HOSPADM

## 2025-06-23 RX ORDER — HYDROCODONE BITARTRATE AND ACETAMINOPHEN 5; 325 MG/1; MG/1
1 TABLET ORAL EVERY 6 HOURS PRN
Status: DISCONTINUED | OUTPATIENT
Start: 2025-06-23 | End: 2025-06-24 | Stop reason: HOSPADM

## 2025-06-23 RX ORDER — MORPHINE SULFATE 2 MG/ML
2 INJECTION, SOLUTION INTRAMUSCULAR; INTRAVENOUS EVERY 6 HOURS PRN
Status: DISCONTINUED | OUTPATIENT
Start: 2025-06-23 | End: 2025-06-24 | Stop reason: HOSPADM

## 2025-06-23 RX ADMIN — CEFTRIAXONE SODIUM 1000 MG: 1 INJECTION, POWDER, FOR SOLUTION INTRAMUSCULAR; INTRAVENOUS at 09:50

## 2025-06-23 RX ADMIN — Medication 10 ML: at 20:16

## 2025-06-23 RX ADMIN — Medication 10 ML: at 08:44

## 2025-06-23 RX ADMIN — ROSUVASTATIN CALCIUM 20 MG: 10 TABLET, FILM COATED ORAL at 08:43

## 2025-06-23 RX ADMIN — OXYBUTYNIN CHLORIDE 15 MG: 5 TABLET, EXTENDED RELEASE ORAL at 08:43

## 2025-06-23 RX ADMIN — MORPHINE SULFATE 2 MG: 2 INJECTION, SOLUTION INTRAMUSCULAR; INTRAVENOUS at 10:22

## 2025-06-23 RX ADMIN — AMOXICILLIN AND CLAVULANATE POTASSIUM 1 TABLET: 875; 125 TABLET, COATED ORAL at 08:43

## 2025-06-23 RX ADMIN — ESCITALOPRAM 20 MG: 10 TABLET, FILM COATED ORAL at 08:43

## 2025-06-23 RX ADMIN — SENNOSIDES AND DOCUSATE SODIUM 2 TABLET: 50; 8.6 TABLET ORAL at 08:43

## 2025-06-23 RX ADMIN — SENNOSIDES AND DOCUSATE SODIUM 2 TABLET: 50; 8.6 TABLET ORAL at 20:16

## 2025-06-23 RX ADMIN — HYDROCODONE BITARTRATE AND ACETAMINOPHEN 1 TABLET: 5; 325 TABLET ORAL at 12:34

## 2025-06-23 NOTE — THERAPY TREATMENT NOTE
"Subjective: Pt agreeable to therapeutic plan of care.    Objective:     Bed mobility - SBA  Transfers - SBA and with rolling walker  Ambulation - 50 feet SBA, CGA, and with rolling walker    Vitals: WNL    Pain: 5 [place]-Baker Location: back  Intervention for pain: Repositioned and Increased Activity    Education: Provided education on the importance of mobility in the acute care setting, Verbal/Tactile Cues, and Gait Training    Assessment: Yaneli Hernandez presents with functional mobility impairments which indicate the need for skilled intervention. Patient much improved. Only needed SBA for supine to sit and transfers using rw.Able to ambulate 50 ft using rw with CG/SBA, no inc pain reported with activity. Patient should be safe to return home at this time. Tolerating session today without incident. Will continue to follow and progress as tolerated.     Plan/Recommendations:   If medically appropriate, Low Intensity Therapy recommended post-acute care - This is recommended as therapy feels this patient would require 2-3 visits per week. OP or HH would be the best option depending on patient's home bound status. Consider, if the patient has other  \"skilled\" needs such as wounds, IV antibiotics, etc. Combined with \"low intensity\" could also equate to a SNF. If patient is medically complex, consider LTAC. Pt requires no DME at discharge.     Pt desires Home with family assist at discharge. Pt cooperative; agreeable to therapeutic recommendations and plan of care.         Basic Mobility 6-click:  Rollin = Total, A lot = 2, A little = 3; 4 = None  Supine>Sit:   1 = Total, A lot = 2, A little = 3; 4 = None   Sit>Stand with arms:  1 = Total, A lot = 2, A little = 3; 4 = None  Bed>Chair:   1 = Total, A lot = 2, A little = 3; 4 = None  Ambulate in room:  1 = Total, A lot = 2, A little = 3; 4 = None  3-5 Steps with railin = Total, A lot = 2, A little = 3; 4 = None  Score: 18    Post-Tx Position: Up in " Chair, Alarms activated, and Call light and personal items within reach  PPE: gloves and surgical mask    Therapy Charges for Today       Code Description Service Date Service Provider Modifiers Qty    57790010349 HC GAIT TRAINING EA 15 MIN 6/23/2025 Reyes, Carmela, PT GP 1    [id number]  PT THERAPEUTIC ACT EA 15 MIN 6/23/2025 Reyes, Carmela, PT GP 1           PT Charges       Row Name 06/23/25 1448             Time Calculation    Start Time 1405  -CR      Stop Time 1421  -CR      Time Calculation (min) 16 min  -CR      PT Received On 06/23/25  -CR      PT - Next Appointment 06/24/25  -CR         Time Calculation- PT    Total Timed Code Minutes- PT 16 minute(s)  -CR                User Key  (r) = Recorded By, (t) = Taken By, (c) = Cosigned By      Initials Name Provider Type    CR Reyes, Carmela, PT Physical Therapist

## 2025-06-23 NOTE — PLAN OF CARE
Assessment: Yaneli Hernandez presents with functional mobility impairments which indicate the need for skilled intervention. Patient much improved. Only needed SBA for supine to sit and transfers using rw.Able to ambulate 50 ft using rw with CG/SBA, no inc pain reported with activity. Patient should be safe to return home at this time. Tolerating session today without incident. Will continue to follow and progress as tolerated.     Anticipated Discharge Disposition (PT): home with assist, home with home health

## 2025-06-23 NOTE — PROGRESS NOTES
[facility] MEDICINE SERVICE  DAILY PROGRESS NOTE    NAME: Yaneli Hernandez  : 1950  MRN: [id number]      LOS: 1 day     PROVIDER OF SERVICE: Nasra Weaver MD    Chief Complaint: Low back pain radiating to right lower extremity    Subjective:   Patient lying in bed complaining of of right CVA angle tenderness  Interval History:    Patient seen and evaluated at bedside.   She denies chest pain nausea vomiting diarrhea abdominal pain  Treatment plan discussed with patient. All questions addressed.     Review of Systems:   All 21 ROS were negative except mentioned above.    Objective:     Vital Signs  Temp:  [97.7 °F (36.5 °C)-98.6 °F (37 °C)] 97.7 °F (36.5 °C)  Heart Rate:  [57-73] 73  Resp:  [13-23] 15  BP: (152-188)/(71-88) 159/75  Flow (L/min) (Oxygen Therapy):  [2] 2   Body mass index is 24.22 kg/m².    Physical Exam   General: No acute distress, appears stated age  Neuro: Awake and alert, oriented x3, no focal deficits appreciated  Head: Atraumatic, normocephalic  HEENT: EOMI, anicteric, normal sclerae and conjunctivae, moist mucus membranes  Neck: supple, no lymphadenopathy  CV: RRR, soft heart sounds, no murmurs appreciated, no peripheral edema  Pulm: Decreased breath sounds, no increased work of breathing, no adventitious sounds  Abd: Soft, nontender, nondistended, positive right CVA tenderness  Skin: Warm, dry and intact  Psych: Appropriate mood and affect    Scheduled Meds   amoxicillin-clavulanate, 1 tablet, Oral, Q12H  escitalopram, 20 mg, Oral, Daily  ondansetron, 4 mg, Intravenous, Once  oxybutynin XL, 15 mg, Oral, Daily  rosuvastatin, 20 mg, Oral, Daily  senna-docusate sodium, 2 tablet, Oral, BID  sodium chloride, 10 mL, Intravenous, Q12H       PRN Meds     acetaminophen **OR** acetaminophen **OR** acetaminophen    ALPRAZolam    aluminum-magnesium hydroxide-simethicone    senna-docusate sodium **AND** polyethylene glycol **AND** bisacodyl **AND** bisacodyl    melatonin    sodium chloride     sodium chloride   Infusions         Diagnostic Data    Results from last 7 days   Lab Units 06/23/25  0508   WBC 10*3/mm3 12.27*   HEMOGLOBIN g/dL 13.7   HEMATOCRIT % 42.6   PLATELETS 10*3/mm3 241   GLUCOSE mg/dL 108*   CREATININE mg/dL 0.81   BUN mg/dL 13.1   SODIUM mmol/L 134*   POTASSIUM mmol/L 3.9   AST (SGOT) U/L 12   ALT (SGPT) U/L <5   ALK PHOS U/L 66   BILIRUBIN mg/dL 0.6   ANION GAP mmol/L 9.8       CT Pelvis Without Contrast  Result Date: 6/21/2025  Impression: 1.No acute osseous abnormality. 2.Mild bilateral hip arthritis. 3.Mild right SI joint arthritis. 4.Partially visualized lumbar spondylosis. Electronically Signed: Shai Andrews MD  6/21/2025 8:24 PM EDT  Workstation ID: [id number]    XR Pelvis 1 or 2 View  Result Date: 6/21/2025  Impression: 1.No evidence of acute fracture. 2.Mild bilateral hip arthritis. Electronically Signed: Shai Andrews MD  6/21/2025 5:08 PM EDT  Workstation ID: [id number]    CT Head Without Contrast  Result Date: 6/21/2025  Impression: 1.No acute intracranial abnormality is identified. 2.Sphenoid sinus disease. 3.No acute cervical spine fracture is identified. 4.Minimal grade 1 anterolisthesis of C4 on C5. 5.Multilevel mild to moderate degenerative changes. Electronically Signed: Rebecca Zamora MD  6/21/2025 4:56 PM EDT  Workstation ID: [id number]    CT Cervical Spine Without Contrast  Result Date: 6/21/2025  Impression: 1.No acute intracranial abnormality is identified. 2.Sphenoid sinus disease. 3.No acute cervical spine fracture is identified. 4.Minimal grade 1 anterolisthesis of C4 on C5. 5.Multilevel mild to moderate degenerative changes. Electronically Signed: Rebecca Zamora MD  6/21/2025 4:56 PM EDT  Workstation ID: [id number]      Interval results reviewed.    Assessment/Plan:   UTI   right lower back pain  Acute sphenoid sinusitis  DJD  Status post fall  Impaired mobility  COPD-not in exacerbation  Bradycardia  Hyperlipidemia  Major depression and anxiety  Headache     Urine  culture is pending white blood count trending up.  Will start patient on ceftriaxone     X-rays of pelvis and CT cervical spine and pelvis with no acute fractures, bilateral hip arthritis, grade 1 anterolisthesis of C4 on C5 and moderate DJD of cervical spine, mild right SI joint arthritis and lumbar spondylosis.     Take likely due to sphenoid sinusitis, DC Augmentin and start ceftriaxone    PT OT     Patient will benefit from 30 days or less of SNF           Treatment plan discussed with RN.     VTE Prophylaxis:  Mechanical VTE prophylaxis orders are present.         Code status is   Code Status and Medical Interventions: CPR (Attempt to Resuscitate); Full Support   Ordered at: 06/21/25 2324     Code Status (Patient has no pulse and is not breathing):    CPR (Attempt to Resuscitate)     Medical Interventions (Patient has pulse or is breathing):    Full Support     Level Of Support Discussed With:    Patient       Plan for disposition:     Barriers to Discharge: PT OT evaluation, pending urine culture  Anticipated Date of Discharge: 6/24/25  Place of Discharge: SNF    Time: 40 minutes     Signature: Electronically signed by Nasra Weaver MD, 06/23/25, 09:08 EDT.  [facility], [place], operated by [facility] Hospitalist Team

## 2025-06-23 NOTE — PLAN OF CARE
Goal Outcome Evaluation:              Outcome Evaluation: Patient has been sleeping throughout this shift with no complaints. Patient is able to make needs  known. Call bell in reach. Bed alarm ON. Ongoing plan of care.

## 2025-06-23 NOTE — CASE MANAGEMENT/SOCIAL WORK
Discharge Planning Assessment   Hi     Patient Name: Yaneli Hernandez  MRN: [id number]  Today's Date: 6/23/2025    Admit Date: 6/21/2025    Plan: Referral to [facility], Pending. No PASRR. No Precert. From home w/ Significant Other.   Discharge Needs Assessment       Row Name 06/23/25 0548       Living Environment    People in Home significant other    Name(s) of People in Home Significant other Bright    Current Living Arrangements home    Potentially Unsafe Housing Conditions none    In the past 12 months has the electric, gas, oil, or water company threatened to shut off services in your home? No    Primary Care Provided by self    Provides Primary Care For no one    Family Caregiver if Needed significant other    Family Caregiver Names S.O. Bright    Quality of Family Relationships helpful;involved;supportive    Able to Return to Prior Arrangements yes       Resource/Environmental Concerns    Resource/Environmental Concerns none    Transportation Concerns none       Transportation Needs    In the past 12 months, has lack of transportation kept you from medical appointments or from getting medications? no    In the past 12 months, has lack of transportation kept you from meetings, work, or from getting things needed for daily living? No       Food Insecurity    Within the past 12 months, you worried that your food would run out before you got the money to buy more. Never true    Within the past 12 months, the food you bought just didn't last and you didn't have money to get more. Never true       Transition Planning    Patient/Family Anticipates Transition to home with family    Patient/Family Anticipated Services at Transition none    Transportation Anticipated family or friend will provide       Discharge Needs Assessment    Readmission Within the Last 30 Days no previous admission in last 30 days    Equipment Currently Used at Home none    Concerns to be Addressed no discharge needs identified;denies  needs/concerns at this time    Do you want help finding or keeping work or a job? I do not need or want help    Do you want help with school or training? For example, starting or completing job training or getting a high school diploma, GED or equivalent No    Anticipated Changes Related to Illness none    Equipment Needed After Discharge none    Provided Post Acute Provider List? N/A    Provided Post Acute Provider Quality & Resource List? N/A    Offered/Gave Vendor List no                   Discharge Plan       Row Name 06/23/25 7848       Plan    Plan Referral to [facility], Pending. No PASRR. No Precert. From home w/ Significant Other.    Patient/Family in Agreement with Plan yes    Plan Comments CM met with patient at the bedside to review discharge planning. Patient lives at home with significant other Bright, is IADLs. Significant Other Bright to transport patient at d/c. Confirmed PCP, insurance, and pharmacy. Patient declines [facility]. Patient denies any difficulty affording food, utilities, or medications. Patient is not current with any HHC/PT services. CM inquired if pt was agreeable to IRF. Pt reported she was and choices are 1. [facility]. CM notifed liaison and sent referral via Epic. DC Barriers: IV abx, urine cultures pending, elevated WBC                  Continued Care and Services - Admitted Since 6/21/2025       Destination       Service Provider Request Status Services Address Phone Fax Patient Preferred    [facility] Pending - Request Sent -- [address] [place] [address] [phone] --                  Expected Discharge Date and Time       Expected Discharge Date Expected Discharge Time    Jun 24, 2025            Demographic Summary       Row Name 06/23/25 1615       General Information    Admission Type inpatient    Arrived From emergency department    Required Notices Provided Important Message from Medicare    Referral Source admission list     Reason for Consult discharge planning    Preferred Language English       Contact Information    Permission Granted to Share Info With     Contact Information Obtained for                    Functional Status       Row Name 06/23/25 1550       Functional Status    Usual Activity Tolerance good    Current Activity Tolerance moderate       Functional Status, IADL    Medications independent    Meal Preparation independent    Housekeeping independent    Laundry independent    Shopping independent    If for any reason you need help with day-to-day activities such as bathing, preparing meals, shopping, managing finances, etc., do you get the help you need? I get all the help I need       Mental Status    General Appearance WDL WDL       Mental Status Summary    Recent Changes in Mental Status/Cognitive Functioning no changes                Patient Forms       Row Name 06/23/25 1542       Patient Forms    Important Message from Medicare (IMM) Delivered  IMM per  6/23               Makayla Bergeron RN     [phone]  [email or web address]

## 2025-06-23 NOTE — PLAN OF CARE
Problem: Adult Inpatient Plan of Care  Goal: Plan of Care Review  Outcome: Progressing  Flowsheets (Taken 6/23/2025 0528 by Xi Zapata RN)  Outcome Evaluation: Patient has been sleeping throughout this shift with no complaints. Patient is able to make needs  known. Call bell in reach. Bed alarm ON. Ongoing plan of care.  Goal: Patient-Specific Goal (Individualized)  Outcome: Progressing  Goal: Absence of Hospital-Acquired Illness or Injury  Outcome: Progressing  Intervention: Identify and Manage Fall Risk  Recent Flowsheet Documentation  Taken 6/23/2025 1404 by Vero Joseph RN  Safety Promotion/Fall Prevention:   clutter free environment maintained   room organization consistent   safety round/check completed  Taken 6/23/2025 1226 by Vero Joseph RN  Safety Promotion/Fall Prevention:   clutter free environment maintained   room organization consistent   safety round/check completed  Taken 6/23/2025 1045 by Vero Joseph RN  Safety Promotion/Fall Prevention:   clutter free environment maintained   room organization consistent   safety round/check completed  Taken 6/23/2025 0858 by Vero Joseph RN  Safety Promotion/Fall Prevention:   clutter free environment maintained   room organization consistent   safety round/check completed  Intervention: Prevent Skin Injury  Recent Flowsheet Documentation  Taken 6/23/2025 0858 by Vero Joseph RN  Body Position:   supine   position changed independently  Skin Protection: incontinence pads utilized  Intervention: Prevent and Manage VTE (Venous Thromboembolism) Risk  Recent Flowsheet Documentation  Taken 6/23/2025 0858 by Vero Joseph RN  VTE Prevention/Management: SCDs (sequential compression devices) off  Intervention: Prevent Infection  Recent Flowsheet Documentation  Taken 6/23/2025 1404 by Vero Joseph RN  Infection Prevention: single patient room provided  Taken 6/23/2025 1226 by Vero Joseph RN  Infection  Prevention: single patient room provided  Taken 6/23/2025 1045 by Vero Joseph RN  Infection Prevention: single patient room provided  Taken 6/23/2025 0858 by Vero Joseph RN  Infection Prevention: single patient room provided  Goal: Optimal Comfort and Wellbeing  Outcome: Progressing  Intervention: Provide Person-Centered Care  Recent Flowsheet Documentation  Taken 6/23/2025 0858 by Vero Joseph RN  Trust Relationship/Rapport:   care explained   choices provided   questions answered   questions encouraged  Goal: Readiness for Transition of Care  Outcome: Progressing     Problem: Pain Acute  Goal: Optimal Pain Control and Function  Outcome: Progressing  Intervention: Optimize Psychosocial Wellbeing  Recent Flowsheet Documentation  Taken 6/23/2025 0858 by Vero Joseph RN  Supportive Measures: active listening utilized  Diversional Activities:   television   smartphone  Intervention: Prevent or Manage Pain  Recent Flowsheet Documentation  Taken 6/23/2025 1404 by Vero Joseph RN  Medication Review/Management: medications reviewed  Taken 6/23/2025 1226 by Vero Joseph RN  Medication Review/Management: medications reviewed  Taken 6/23/2025 1045 by Vero Joseph RN  Medication Review/Management: medications reviewed  Taken 6/23/2025 0858 by Vero Joseph RN  Medication Review/Management: medications reviewed     Problem: Fall Injury Risk  Goal: Absence of Fall and Fall-Related Injury  Outcome: Progressing  Intervention: Identify and Manage Contributors  Recent Flowsheet Documentation  Taken 6/23/2025 1404 by Vero Joseph RN  Medication Review/Management: medications reviewed  Taken 6/23/2025 1226 by Vero Joseph RN  Medication Review/Management: medications reviewed  Taken 6/23/2025 1045 by Vero Joseph RN  Medication Review/Management: medications reviewed  Taken 6/23/2025 0858 by Vero Joseph RN  Medication Review/Management: medications  reviewed  Intervention: Promote Injury-Free Environment  Recent Flowsheet Documentation  Taken 6/23/2025 1404 by Vero Joseph RN  Safety Promotion/Fall Prevention:   clutter free environment maintained   room organization consistent   safety round/check completed  Taken 6/23/2025 1226 by Vero Joseph RN  Safety Promotion/Fall Prevention:   clutter free environment maintained   room organization consistent   safety round/check completed  Taken 6/23/2025 1045 by Vero Joseph RN  Safety Promotion/Fall Prevention:   clutter free environment maintained   room organization consistent   safety round/check completed  Taken 6/23/2025 0858 by Vero Joseph RN  Safety Promotion/Fall Prevention:   clutter free environment maintained   room organization consistent   safety round/check completed     Problem: Skin Injury Risk Increased  Goal: Skin Health and Integrity  Outcome: Progressing  Intervention: Optimize Skin Protection  Recent Flowsheet Documentation  Taken 6/23/2025 0858 by Vero Joseph RN  Activity Management: activity encouraged  Pressure Reduction Techniques: frequent weight shift encouraged  Head of Bed (HOB) Positioning: HOB elevated  Pressure Reduction Devices: pressure-redistributing mattress utilized  Skin Protection: incontinence pads utilized   Goal Outcome Evaluation:

## 2025-06-23 NOTE — DISCHARGE PLACEMENT REQUEST
"Anuj Hernandez (75 y.o. Female)       Date of Birth   1950    Social Security Number       Address   [address] [place] [address]    Home Phone   [phone]    MRN   [id number]       Taoism   None    Marital Status                               Admission Date   6/21/2025    Admission Type   Emergency    Admitting Provider   Nasra Weaver MD    Attending Provider   Nasra Weaver MD    Department, Room/Bed   [facility] INPATIENT, 380/1       Discharge Date       Discharge Disposition       Discharge Destination                                 Attending Provider: Nasra Weaver MD    Allergies: Topamax [Topiramate], Codeine    Isolation: None   Infection: None   Code Status: CPR    Ht: 162.6 cm (64\")   Wt: 64 kg (141 lb 1.5 oz)    Admission Cmt: None   Principal Problem: Low back pain radiating to right lower extremity [M54.50,M79.604]                   Active Insurance as of 6/21/2025       Primary Coverage       Payor Plan Insurance Group Employer/Plan Group    MEDICARE MEDICARE A & B        Payor Plan Address Payor Plan Phone Number Payor Plan Fax Number Effective Dates    [address] [phone]  4/1/2016 - None Entered    [facility] [id number]         Subscriber Name Subscriber Birth Date Member ID       ANUJ HERNANDEZ 1950 [id number]               Secondary Coverage       Payor Plan Insurance Group Employer/Plan Group    MISC MC SUP   MISC  SUP                     Coverage Address Coverage Phone Number Coverage Fax Number Effective Dates    [address] [phone]  6/21/2025 - None Entered    [facility] [id number]         Subscriber Name Subscriber Birth Date Member ID       ANUJ HERNANDEZ 1950 [id number]                     Emergency Contacts        (Rel.) Home Phone Work Phone Mobile Phone    Bright Trejo (Significant Other) [phone] -- [phone]                "

## 2025-06-24 VITALS
TEMPERATURE: 98.1 F | HEART RATE: 72 BPM | BODY MASS INDEX: 24.09 KG/M2 | DIASTOLIC BLOOD PRESSURE: 53 MMHG | WEIGHT: 141.09 LBS | OXYGEN SATURATION: 91 % | SYSTOLIC BLOOD PRESSURE: 107 MMHG | RESPIRATION RATE: 19 BRPM | HEIGHT: 64 IN

## 2025-06-24 LAB
ALBUMIN SERPL-MCNC: 3.6 G/DL (ref 3.5–5.2)
ALBUMIN/GLOB SERPL: 1.6 G/DL
ALP SERPL-CCNC: 63 U/L (ref 39–117)
ALT SERPL W P-5'-P-CCNC: <5 U/L (ref 1–33)
ANION GAP SERPL CALCULATED.3IONS-SCNC: 9.8 MMOL/L (ref 5–15)
AST SERPL-CCNC: 10 U/L (ref 1–32)
BASOPHILS # BLD AUTO: 0.06 10*3/MM3 (ref 0–0.2)
BASOPHILS NFR BLD AUTO: 0.5 % (ref 0–1.5)
BILIRUB SERPL-MCNC: 0.6 MG/DL (ref 0–1.2)
BUN SERPL-MCNC: 14.2 MG/DL (ref 8–23)
BUN/CREAT SERPL: 18.2 (ref 7–25)
CALCIUM SPEC-SCNC: 9 MG/DL (ref 8.6–10.5)
CHLORIDE SERPL-SCNC: 100 MMOL/L (ref 98–107)
CO2 SERPL-SCNC: 27.2 MMOL/L (ref 22–29)
CREAT SERPL-MCNC: 0.78 MG/DL (ref 0.57–1)
DEPRECATED RDW RBC AUTO: 44.5 FL (ref 37–54)
EGFRCR SERPLBLD CKD-EPI 2021: 79.8 ML/MIN/1.73
EOSINOPHIL # BLD AUTO: 0.04 10*3/MM3 (ref 0–0.4)
EOSINOPHIL NFR BLD AUTO: 0.3 % (ref 0.3–6.2)
ERYTHROCYTE [DISTWIDTH] IN BLOOD BY AUTOMATED COUNT: 12.5 % (ref 12.3–15.4)
GLOBULIN UR ELPH-MCNC: 2.3 GM/DL
GLUCOSE SERPL-MCNC: 103 MG/DL (ref 65–99)
HCT VFR BLD AUTO: 42.1 % (ref 34–46.6)
HGB BLD-MCNC: 13.9 G/DL (ref 12–15.9)
IMM GRANULOCYTES # BLD AUTO: 0.04 10*3/MM3 (ref 0–0.05)
IMM GRANULOCYTES NFR BLD AUTO: 0.3 % (ref 0–0.5)
LYMPHOCYTES # BLD AUTO: 1.63 10*3/MM3 (ref 0.7–3.1)
LYMPHOCYTES NFR BLD AUTO: 13.9 % (ref 19.6–45.3)
MCH RBC QN AUTO: 31.5 PG (ref 26.6–33)
MCHC RBC AUTO-ENTMCNC: 33 G/DL (ref 31.5–35.7)
MCV RBC AUTO: 95.5 FL (ref 79–97)
MONOCYTES # BLD AUTO: 0.95 10*3/MM3 (ref 0.1–0.9)
MONOCYTES NFR BLD AUTO: 8.1 % (ref 5–12)
NEUTROPHILS NFR BLD AUTO: 76.9 % (ref 42.7–76)
NEUTROPHILS NFR BLD AUTO: 9 10*3/MM3 (ref 1.7–7)
NRBC BLD AUTO-RTO: 0 /100 WBC (ref 0–0.2)
PLATELET # BLD AUTO: 250 10*3/MM3 (ref 140–450)
PMV BLD AUTO: 11.4 FL (ref 6–12)
POTASSIUM SERPL-SCNC: 3.6 MMOL/L (ref 3.5–5.2)
PROT SERPL-MCNC: 5.9 G/DL (ref 6–8.5)
RBC # BLD AUTO: 4.41 10*6/MM3 (ref 3.77–5.28)
SODIUM SERPL-SCNC: 137 MMOL/L (ref 136–145)
WBC NRBC COR # BLD AUTO: 11.72 10*3/MM3 (ref 3.4–10.8)

## 2025-06-24 PROCEDURE — 25010000002 CEFTRIAXONE PER 250 MG: Performed by: INTERNAL MEDICINE

## 2025-06-24 PROCEDURE — 85025 COMPLETE CBC W/AUTO DIFF WBC: CPT | Performed by: INTERNAL MEDICINE

## 2025-06-24 PROCEDURE — 25010000002 ONDANSETRON PER 1 MG: Performed by: INTERNAL MEDICINE

## 2025-06-24 PROCEDURE — 80053 COMPREHEN METABOLIC PANEL: CPT | Performed by: INTERNAL MEDICINE

## 2025-06-24 RX ORDER — CLONIDINE HYDROCHLORIDE 0.1 MG/1
0.2 TABLET ORAL ONCE
Status: COMPLETED | OUTPATIENT
Start: 2025-06-24 | End: 2025-06-24

## 2025-06-24 RX ORDER — AMLODIPINE BESYLATE 5 MG/1
5 TABLET ORAL
Qty: 30 TABLET | Refills: 0 | Status: ON HOLD | OUTPATIENT
Start: 2025-06-25 | End: 2025-06-28

## 2025-06-24 RX ORDER — HYDROCODONE BITARTRATE AND ACETAMINOPHEN 5; 325 MG/1; MG/1
1 TABLET ORAL EVERY 6 HOURS PRN
Qty: 16 TABLET | Refills: 0 | Status: SHIPPED | OUTPATIENT
Start: 2025-06-24 | End: 2025-06-28

## 2025-06-24 RX ORDER — AMLODIPINE BESYLATE 5 MG/1
5 TABLET ORAL
Status: DISCONTINUED | OUTPATIENT
Start: 2025-06-24 | End: 2025-06-24 | Stop reason: HOSPADM

## 2025-06-24 RX ADMIN — ALPRAZOLAM 2 MG: 1 TABLET ORAL at 10:16

## 2025-06-24 RX ADMIN — SENNOSIDES AND DOCUSATE SODIUM 2 TABLET: 50; 8.6 TABLET ORAL at 09:40

## 2025-06-24 RX ADMIN — Medication 10 ML: at 09:41

## 2025-06-24 RX ADMIN — CEFTRIAXONE SODIUM 1000 MG: 1 INJECTION, POWDER, FOR SOLUTION INTRAMUSCULAR; INTRAVENOUS at 09:40

## 2025-06-24 RX ADMIN — ONDANSETRON 4 MG: 2 INJECTION, SOLUTION INTRAMUSCULAR; INTRAVENOUS at 09:48

## 2025-06-24 RX ADMIN — CLONIDINE HYDROCHLORIDE 0.2 MG: 0.1 TABLET ORAL at 10:16

## 2025-06-24 RX ADMIN — ROSUVASTATIN CALCIUM 20 MG: 10 TABLET, FILM COATED ORAL at 09:40

## 2025-06-24 RX ADMIN — AMLODIPINE BESYLATE 5 MG: 5 TABLET ORAL at 09:40

## 2025-06-24 RX ADMIN — HYDROCODONE BITARTRATE AND ACETAMINOPHEN 1 TABLET: 5; 325 TABLET ORAL at 00:30

## 2025-06-24 RX ADMIN — OXYBUTYNIN CHLORIDE 15 MG: 5 TABLET, EXTENDED RELEASE ORAL at 09:40

## 2025-06-24 RX ADMIN — ESCITALOPRAM 20 MG: 10 TABLET, FILM COATED ORAL at 09:40

## 2025-06-24 NOTE — CASE MANAGEMENT/SOCIAL WORK
Continued Stay Note   Hi     Patient Name: Yaneli Hernandez  MRN: [id number]  Today's Date: 6/24/2025    Admit Date: 6/21/2025    Plan: [facility] accepted. No precert or PASRR required.   Discharge Plan       Row Name 06/24/25 1152       Plan    Plan [facility] accepted. No precert or PASRR required.    Patient/Family in Agreement with Plan yes    Plan Comments CM contacted [facility] liaisons Li and Aleshia to follow up on referral. Secure chat sent to Dr Weaver and nursing and confirmed pt is medically ready. [facility] has bed available today for pt. CM met with pt and her sister Alivia at bedside and updated them. Awaiting confirmation on what time bed will be ready. Sister inquired about transportation and CM reviewed PT note. Pt able to transfer and ambulate with stand-by assist. Discussed that family is first option for transport as long as they feel comfortable. Sister voiced understanding and daughter Nimco will be in shortly. [facility] will be ready at [facility] at 4 PM.    Received update from tonny Jefferson on the unit that [facility] will pick pt up at 4:30.              Megan Naegele, RN     Office Phone: [phone]  Office Cell: [phone]

## 2025-06-24 NOTE — DISCHARGE SUMMARY
"             [facility] Medicine Services  Discharge Summary    Date of Service: 2025  Patient Name: Yaneli Hernandez  : 1950  MRN: [id number]    Date of Admission: 2025  Discharge Diagnosis: Low back pain radiating to right lower extremity  Date of Discharge: 2025  Primary Care Physician: Mina Padilla PA    Presenting Problem:   Pelvic pain [R10.2]  Fall, initial encounter [W19.XXXA]  Pain of right hip [M25.551]  Low back pain radiating to right lower extremity [M54.50, M79.604]    Active and Resolved Hospital Problems:  Active Hospital Problems    Diagnosis POA    **Low back pain radiating to right lower extremity [M54.50, M79.604] Yes    Fall [W19.XXXA] Yes    Pain of right hip [M25.551] Yes    Impaired mobility [Z74.09] Yes    Chronic kidney disease, stage 3a [N18.31] Yes    Hyperlipidemia [E78.5] Yes      Resolved Hospital Problems   No resolved problems to display.         Hospital Course     HPI:  \"Yaneli Hernandez is a 75 y.o. female with PMH of COPD, bradycardia, HLD, anxiety/depression, headaches presented to [facility] ED 2025 with complaints of right posterior hip pain and right lower back pain since falling 4 days ago. She stated she was walking in her yard trying to show someone where the water meter was and she ended up falling and rolling down her hills. She was able to get up and walk to the house but she has had right posterior hip pain and right lower back pain since that time so she has stayed in bed for the last 4 days. Her  has been bringing her meals in bed. She normally walks independently and often walks on the track at the [facility]. EMS was called.      In the ED CT pelvis showed No acute osseous abnormality. Mild bilateral hip arthritis. Mild right SI joint arthritis. Partially visualized lumbar spondylosis. CT head showed No acute intracranial abnormality is identified. Sphenoid sinus disease. No acute cervical spine fracture is identified. Minimal " "grade 1 anterolisthesis of C4 on C5. Multilevel mild to moderate degenerative changes.   CT cervical spine showed no acute intracranial abnormality is identified. Sphenoid sinus disease. No acute cervical spine fracture is identified. Minimal grade 1 anterolisthesis of C4 on C5. Multilevel mild to moderate degenerative changes. WBC 12.84, hgb 11.6, K 3.4. She was given 500cc IVFs and IV morphine 4mg. She had received morphine via EMS. She was ambulated in the ER and was a 2 person assist and was in significant pain. She was admitted for further treatment of right hip/lower back pain.\"    Hospital Course:  Patient was admitted to the hospital.  - CT pelvis: No acute osseous abnormality. Mild bilateral hip arthritis. Mild right SI joint arthritis. Partially visualized lumbar spondylosis.   - CT head: No acute intracranial abnormality is identified. Sphenoid sinus disease. No acute cervical spine fracture is identified. Minimal grade 1 anterolisthesis of C4 on C5. Multilevel mild to moderate degenerative changes.   - CT cervical spine: no acute intracranial abnormality is identified. Sphenoid sinus disease. No acute cervical spine fracture is identified. Minimal grade 1 anterolisthesis of C4 on C5. Multilevel mild to moderate degenerative changes  - WBC 12.84, hgb 11.6, K 3.4  - Patient was started on ceftriaxone for possible UTI..  Urine culture no growth and normal urogenital milly.  Ceftriaxone was discontinued  - given 500cc IVFs and IV morphine 4mg in the ER  - difficulty ambulating in the ER with 2 person assist  Pain was controlled with Norco.  Patient also has sphenoid sinusitis, for which she needs antibiotic for 7 days.    DISCHARGE Follow Up Recommendations for labs and diagnostics:   F/u with pcp    Reasons For Change In Medications and Indications for New Medications:  Amlodipine 5mg po qd  Augmentin 875 mg twice daily  Norco  Day of Discharge     Vital Signs:  Temp:  [98.1 °F (36.7 °C)-98.4 °F (36.9 °C)] " 98.4 °F (36.9 °C)  Heart Rate:  [63-92] 64  Resp:  [14-24] 24  BP: (134-170)/(69-77) 170/77  Flow (L/min) (Oxygen Therapy):  [2] 2    Physical Exam:  Vitals and nursing note reviewed.   Constitutional:       Appearance: Normal appearance.   HENT:      Head: Normocephalic and atraumatic.   Cardiovascular:      Rate and Rhythm: Normal rate and rhythm.      Heart sounds: Normal heart sounds.   Pulmonary:      Effort: Pulmonary effort is normal.      Breath sounds: Decreased breath sounds.   Abdominal:      Palpations: Abdomen is soft.   Musculoskeletal:      Cervical back: Neck supple.   Neurological:      Mental Status: Mental status is at baseline.     Pertinent  and/or Most Recent Results     LAB RESULTS:      Lab 06/24/25 0327 06/23/25  0508 06/22/25  0512 06/21/25 2032   WBC 11.72* 12.27* 11.04* 12.84*   HEMOGLOBIN 13.9 13.7 13.3 11.6*   HEMATOCRIT 42.1 42.6 42.6 37.0   PLATELETS 250 241 225 154   NEUTROS ABS 9.00* 9.10* 8.88* 9.36*   IMMATURE GRANS (ABS) 0.04 0.08* 0.05 0.08*   LYMPHS ABS 1.63 2.10 1.17 2.22   MONOS ABS 0.95* 0.89 0.86 1.09*   EOS ABS 0.04 0.03 0.03 0.03   MCV 95.5 96.4 100.2* 99.7*         Lab 06/24/25 0327 06/23/25  0508 06/22/25  0512 06/21/25  1848   SODIUM 137 134* 144 145   POTASSIUM 3.6 3.9 4.6 3.4*   CHLORIDE 100 99 107 109*   CO2 27.2 25.2 26.8 24.1   ANION GAP 9.8 9.8 10.2 11.9   BUN 14.2 13.1 16.6 14.8   CREATININE 0.78 0.81 0.92 0.86   EGFR 79.8 76.3 65.5 71.0   GLUCOSE 103* 108* 126* 98   CALCIUM 9.0 9.2 9.4 8.8         Lab 06/24/25 0327 06/23/25  0508 06/21/25  1848   TOTAL PROTEIN 5.9* 6.2 6.1   ALBUMIN 3.6 3.6 3.9   GLOBULIN 2.3 2.6 2.2   ALT (SGPT) <5 <5 6   AST (SGOT) 10 12 19   BILIRUBIN 0.6 0.6 0.7   ALK PHOS 63 66 67                     Brief Urine Lab Results  (Last result in the past 365 days)        Color   Clarity   Blood   Leuk Est   Nitrite   Protein   CREAT   Urine HCG        06/22/25 0653 Yellow   Clear   Negative   Moderate (2+)   Negative   Negative                  Microbiology Results (last 10 days)       Procedure Component Value - Date/Time    Urine Culture - Urine, Urine, Clean Catch [[id number]] Collected: 06/22/25 0653    Lab Status: Final result Specimen: Urine, Clean Catch Updated: 06/23/25 1057     Urine Culture 25,000 CFU/mL Normal Urogenital Arpita    Narrative:      Colonization of the urinary tract without infection is common. Treatment is discouraged unless the patient is symptomatic, pregnant, or undergoing an invasive urologic procedure.            CT Pelvis Without Contrast  Result Date: 6/21/2025  Impression: Impression: 1.No acute osseous abnormality. 2.Mild bilateral hip arthritis. 3.Mild right SI joint arthritis. 4.Partially visualized lumbar spondylosis. Electronically Signed: Shai Andrews MD  6/21/2025 8:24 PM EDT  Workstation ID: [id number]    XR Pelvis 1 or 2 View  Result Date: 6/21/2025  Impression: Impression: 1.No evidence of acute fracture. 2.Mild bilateral hip arthritis. Electronically Signed: Shai Andrews MD  6/21/2025 5:08 PM EDT  Workstation ID: [id number]    CT Head Without Contrast  Result Date: 6/21/2025  Impression: Impression: 1.No acute intracranial abnormality is identified. 2.Sphenoid sinus disease. 3.No acute cervical spine fracture is identified. 4.Minimal grade 1 anterolisthesis of C4 on C5. 5.Multilevel mild to moderate degenerative changes. Electronically Signed: Rebecca Zamora MD  6/21/2025 4:56 PM EDT  Workstation ID: [id number]    CT Cervical Spine Without Contrast  Result Date: 6/21/2025  Impression: Impression: 1.No acute intracranial abnormality is identified. 2.Sphenoid sinus disease. 3.No acute cervical spine fracture is identified. 4.Minimal grade 1 anterolisthesis of C4 on C5. 5.Multilevel mild to moderate degenerative changes. Electronically Signed: Rebecca Zamora MD  6/21/2025 4:56 PM EDT  Workstation ID: [id number]              Results for orders placed during the hospital encounter of 12/14/22    Adult Transthoracic Echo  Complete W/ Cont if Necessary Per Protocol    Interpretation Summary    Left ventricular systolic function is normal. Calculated left ventricular EF = 55% Estimated left ventricular EF = 55%    Left ventricular diastolic function is consistent with (grade I) impaired relaxation.    Estimated right ventricular systolic pressure from tricuspid regurgitation is normal (<35 mmHg).      Labs Pending at Discharge:  Pending Results       None            Procedures Performed         Consults:   Consults       Date and Time Order Name Status Description    6/21/2025  8:37 PM Hospitalist (on-call MD unless specified)              Discharge Details        Discharge Medications        New Medications        Instructions Start Date   amLODIPine 5 MG tablet  Commonly known as: NORVASC   5 mg, Oral, Every 24 Hours Scheduled   Start Date: June 25, 2025     amoxicillin-clavulanate 875-125 MG per tablet  Commonly known as: AUGMENTIN   1 tablet, Oral, 2 Times Daily      HYDROcodone-acetaminophen 5-325 MG per tablet  Commonly known as: NORCO   1 tablet, Oral, Every 6 Hours PRN             Continue These Medications        Instructions Start Date   ALPRAZolam 2 MG tablet  Commonly known as: XANAX   2 mg, Every 6 Hours PRN      escitalopram 20 MG tablet  Commonly known as: LEXAPRO   20 mg, Daily      oxybutynin XL 15 MG 24 hr tablet  Commonly known as: DITROPAN XL   15 mg, Daily      rosuvastatin 20 MG tablet  Commonly known as: CRESTOR   20 mg, Daily               Allergies   Allergen Reactions    Topamax [Topiramate] Hives and Other (See Comments)     CAUSED KIDNEY STONES    Codeine Hives and Itching       Discharge Disposition:   Home or Self Care    Diet:  Diet Instructions       Diet: Cardiac Diets; Low Sodium (2g); Regular (IDDSI 7); Thin (IDDSI 0)      Discharge Diet: Cardiac Diets    Cardiac Diet: Low Sodium (2g)    Texture: Regular (IDDSI 7)    Fluid Consistency: Thin (IDDSI 0)            Discharge Activity:   Activity  Instructions       Activity as Tolerated              CODE STATUS:  Code Status and Medical Interventions: CPR (Attempt to Resuscitate); Full Support   Ordered at: 06/21/25 4697     Code Status (Patient has no pulse and is not breathing):    CPR (Attempt to Resuscitate)     Medical Interventions (Patient has pulse or is breathing):    Full Support     Level Of Support Discussed With:    Patient       No future appointments.    Additional Instructions for the Follow-ups that You Need to Schedule       Discharge Follow-up with PCP   As directed       Currently Documented PCP:    Mina Padilla PA    PCP Phone Number:    [phone]     Follow Up Details: 2-3d                Time spent on Discharge including face to face service:  >30 minutes    Signature: Electronically signed by Nasra Weaver MD, 06/24/25, 10:07 EDT.  [facility] Hospitalist Team

## 2025-06-24 NOTE — CASE MANAGEMENT/SOCIAL WORK
Case Management Discharge Note      Final Note: DIANE Cai.      Selected Continued Care - Discharged on 6/24/2025 Admission date: 6/21/2025 - Discharge disposition: Skilled Nursing Facility (DC - External)      Destination Coordination complete.      Service Provider Services Address Phone Fax Patient Preferred    [facility] [address] [place] [address] [place] [address] [phone] --             Transportation Services  Transportation: W/C Van  W/C Van: Skilled Nursing Facility [facility] (DIANE Morales)    Final Discharge Disposition Code: 62 - inpatient rehab facility

## 2025-06-24 NOTE — PLAN OF CARE
Goal Outcome Evaluation:              Outcome Evaluation: Pt continues with prn pain medication.  No other issues this shift.  Will continue to monitor.

## 2025-06-24 NOTE — PLAN OF CARE
Problem: Adult Inpatient Plan of Care  Goal: Plan of Care Review  Outcome: Met  Goal: Patient-Specific Goal (Individualized)  Outcome: Met  Goal: Absence of Hospital-Acquired Illness or Injury  Outcome: Met  Intervention: Identify and Manage Fall Risk  Recent Flowsheet Documentation  Taken 6/24/2025 1053 by Vero Joseph RN  Safety Promotion/Fall Prevention:   clutter free environment maintained   room organization consistent   safety round/check completed  Taken 6/24/2025 0958 by Vero Joseph RN  Safety Promotion/Fall Prevention:   clutter free environment maintained   room organization consistent   safety round/check completed  Intervention: Prevent Skin Injury  Recent Flowsheet Documentation  Taken 6/24/2025 0958 by Vero Joseph RN  Body Position:   supine   position changed independently  Skin Protection: incontinence pads utilized  Intervention: Prevent Infection  Recent Flowsheet Documentation  Taken 6/24/2025 1053 by Vero Joseph RN  Infection Prevention: single patient room provided  Taken 6/24/2025 0958 by Vero Joseph RN  Infection Prevention: single patient room provided  Goal: Optimal Comfort and Wellbeing  Outcome: Met  Intervention: Provide Person-Centered Care  Recent Flowsheet Documentation  Taken 6/24/2025 0958 by Vero Joseph RN  Trust Relationship/Rapport:   care explained   choices provided   questions answered   questions encouraged  Goal: Readiness for Transition of Care  Outcome: Met     Problem: Pain Acute  Goal: Optimal Pain Control and Function  Outcome: Met  Intervention: Optimize Psychosocial Wellbeing  Recent Flowsheet Documentation  Taken 6/24/2025 0958 by Vero Joseph RN  Supportive Measures: active listening utilized  Diversional Activities:   television   smartphone  Intervention: Prevent or Manage Pain  Recent Flowsheet Documentation  Taken 6/24/2025 1053 by Vero Joseph RN  Medication Review/Management: medications  reviewed  Taken 6/24/2025 0958 by Vero Joseph RN  Medication Review/Management: medications reviewed     Problem: Fall Injury Risk  Goal: Absence of Fall and Fall-Related Injury  Outcome: Met  Intervention: Identify and Manage Contributors  Recent Flowsheet Documentation  Taken 6/24/2025 1053 by Vero Joseph RN  Medication Review/Management: medications reviewed  Taken 6/24/2025 0958 by Vero Joseph RN  Medication Review/Management: medications reviewed  Intervention: Promote Injury-Free Environment  Recent Flowsheet Documentation  Taken 6/24/2025 1053 by Vero Joseph RN  Safety Promotion/Fall Prevention:   clutter free environment maintained   room organization consistent   safety round/check completed  Taken 6/24/2025 0958 by Vero Joseph RN  Safety Promotion/Fall Prevention:   clutter free environment maintained   room organization consistent   safety round/check completed     Problem: Skin Injury Risk Increased  Goal: Skin Health and Integrity  Outcome: Met  Intervention: Optimize Skin Protection  Recent Flowsheet Documentation  Taken 6/24/2025 0958 by Vero Joseph RN  Activity Management: activity encouraged  Pressure Reduction Techniques: frequent weight shift encouraged  Head of Bed (HOB) Positioning: HOB elevated  Pressure Reduction Devices: pressure-redistributing mattress utilized  Skin Protection: incontinence pads utilized   Goal Outcome Evaluation:

## 2025-06-27 ENCOUNTER — HOSPITAL ENCOUNTER (OUTPATIENT)
Facility: HOSPITAL | Age: 75
Setting detail: OBSERVATION
Discharge: NURSING, REHAB OR LONG-TERM CARE | End: 2025-06-28
Attending: EMERGENCY MEDICINE | Admitting: EMERGENCY MEDICINE
Payer: MEDICARE

## 2025-06-27 ENCOUNTER — APPOINTMENT (OUTPATIENT)
Dept: GENERAL RADIOLOGY | Facility: HOSPITAL | Age: 75
End: 2025-06-27
Payer: MEDICARE

## 2025-06-27 ENCOUNTER — APPOINTMENT (OUTPATIENT)
Dept: CT IMAGING | Facility: HOSPITAL | Age: 75
End: 2025-06-27
Payer: MEDICARE

## 2025-06-27 DIAGNOSIS — S32.010A CLOSED COMPRESSION FRACTURE OF BODY OF L1 VERTEBRA: ICD-10-CM

## 2025-06-27 DIAGNOSIS — I10 HYPERTENSION, UNSPECIFIED TYPE: Primary | ICD-10-CM

## 2025-06-27 DIAGNOSIS — R07.9 CHEST PAIN, UNSPECIFIED TYPE: ICD-10-CM

## 2025-06-27 LAB
ALBUMIN SERPL-MCNC: 4 G/DL (ref 3.5–5.2)
ALBUMIN/GLOB SERPL: 1.6 G/DL
ALP SERPL-CCNC: 74 U/L (ref 39–117)
ALT SERPL W P-5'-P-CCNC: <5 U/L (ref 1–33)
ANION GAP SERPL CALCULATED.3IONS-SCNC: 16 MMOL/L (ref 5–15)
AST SERPL-CCNC: 15 U/L (ref 1–32)
BASOPHILS # BLD AUTO: 0.04 10*3/MM3 (ref 0–0.2)
BASOPHILS NFR BLD AUTO: 0.3 % (ref 0–1.5)
BILIRUB SERPL-MCNC: 1 MG/DL (ref 0–1.2)
BUN SERPL-MCNC: 15.7 MG/DL (ref 8–23)
BUN/CREAT SERPL: 16 (ref 7–25)
CALCIUM SPEC-SCNC: 9.5 MG/DL (ref 8.6–10.5)
CHLORIDE SERPL-SCNC: 103 MMOL/L (ref 98–107)
CO2 SERPL-SCNC: 22 MMOL/L (ref 22–29)
CREAT SERPL-MCNC: 0.98 MG/DL (ref 0.57–1)
DEPRECATED RDW RBC AUTO: 44.4 FL (ref 37–54)
EGFRCR SERPLBLD CKD-EPI 2021: 60.7 ML/MIN/1.73
EOSINOPHIL # BLD AUTO: 0 10*3/MM3 (ref 0–0.4)
EOSINOPHIL NFR BLD AUTO: 0 % (ref 0.3–6.2)
ERYTHROCYTE [DISTWIDTH] IN BLOOD BY AUTOMATED COUNT: 13 % (ref 12.3–15.4)
GEN 5 1HR TROPONIN T REFLEX: 19 NG/L
GLOBULIN UR ELPH-MCNC: 2.5 GM/DL
GLUCOSE SERPL-MCNC: 122 MG/DL (ref 65–99)
HCT VFR BLD AUTO: 45.4 % (ref 34–46.6)
HGB BLD-MCNC: 15.3 G/DL (ref 12–15.9)
IMM GRANULOCYTES # BLD AUTO: 0.1 10*3/MM3 (ref 0–0.05)
IMM GRANULOCYTES NFR BLD AUTO: 0.7 % (ref 0–0.5)
LYMPHOCYTES # BLD AUTO: 1.86 10*3/MM3 (ref 0.7–3.1)
LYMPHOCYTES NFR BLD AUTO: 12.4 % (ref 19.6–45.3)
MCH RBC QN AUTO: 31.3 PG (ref 26.6–33)
MCHC RBC AUTO-ENTMCNC: 33.7 G/DL (ref 31.5–35.7)
MCV RBC AUTO: 92.8 FL (ref 79–97)
MONOCYTES # BLD AUTO: 1.04 10*3/MM3 (ref 0.1–0.9)
MONOCYTES NFR BLD AUTO: 6.9 % (ref 5–12)
NEUTROPHILS NFR BLD AUTO: 12.01 10*3/MM3 (ref 1.7–7)
NEUTROPHILS NFR BLD AUTO: 79.7 % (ref 42.7–76)
NRBC BLD AUTO-RTO: 0 /100 WBC (ref 0–0.2)
PLATELET # BLD AUTO: 361 10*3/MM3 (ref 140–450)
PMV BLD AUTO: 10.9 FL (ref 6–12)
POTASSIUM SERPL-SCNC: 3.5 MMOL/L (ref 3.5–5.2)
PROT SERPL-MCNC: 6.5 G/DL (ref 6–8.5)
RBC # BLD AUTO: 4.89 10*6/MM3 (ref 3.77–5.28)
SODIUM SERPL-SCNC: 141 MMOL/L (ref 136–145)
TROPONIN T % DELTA: -10
TROPONIN T NUMERIC DELTA: -2 NG/L
TROPONIN T SERPL HS-MCNC: 21 NG/L
WBC NRBC COR # BLD AUTO: 15.05 10*3/MM3 (ref 3.4–10.8)

## 2025-06-27 PROCEDURE — G0378 HOSPITAL OBSERVATION PER HR: HCPCS

## 2025-06-27 PROCEDURE — 25510000001 IOPAMIDOL PER 1 ML: Performed by: EMERGENCY MEDICINE

## 2025-06-27 PROCEDURE — 71275 CT ANGIOGRAPHY CHEST: CPT

## 2025-06-27 PROCEDURE — 99285 EMERGENCY DEPT VISIT HI MDM: CPT

## 2025-06-27 PROCEDURE — 93005 ELECTROCARDIOGRAM TRACING: CPT | Performed by: EMERGENCY MEDICINE

## 2025-06-27 PROCEDURE — 36415 COLL VENOUS BLD VENIPUNCTURE: CPT

## 2025-06-27 PROCEDURE — 84484 ASSAY OF TROPONIN QUANT: CPT | Performed by: EMERGENCY MEDICINE

## 2025-06-27 PROCEDURE — 85025 COMPLETE CBC W/AUTO DIFF WBC: CPT | Performed by: EMERGENCY MEDICINE

## 2025-06-27 PROCEDURE — 71045 X-RAY EXAM CHEST 1 VIEW: CPT

## 2025-06-27 PROCEDURE — 80053 COMPREHEN METABOLIC PANEL: CPT | Performed by: EMERGENCY MEDICINE

## 2025-06-27 RX ORDER — ACETAMINOPHEN 325 MG/1
650 TABLET ORAL EVERY 4 HOURS PRN
COMMUNITY

## 2025-06-27 RX ORDER — ASPIRIN 81 MG/1
324 TABLET, CHEWABLE ORAL ONCE
Status: COMPLETED | OUTPATIENT
Start: 2025-06-27 | End: 2025-06-27

## 2025-06-27 RX ORDER — SORBITOL SOLUTION 70 %
30 SOLUTION, ORAL MISCELLANEOUS 2 TIMES DAILY PRN
COMMUNITY

## 2025-06-27 RX ORDER — SODIUM PHOSPHATE,MONO-DIBASIC 19G-7G/197
1 ENEMA (ML) RECTAL ONCE AS NEEDED
COMMUNITY

## 2025-06-27 RX ORDER — BISACODYL 5 MG/1
5 TABLET, DELAYED RELEASE ORAL DAILY PRN
Status: DISCONTINUED | OUTPATIENT
Start: 2025-06-27 | End: 2025-06-28 | Stop reason: HOSPADM

## 2025-06-27 RX ORDER — ALPRAZOLAM 0.5 MG
0.5 TABLET ORAL ONCE
Status: DISCONTINUED | OUTPATIENT
Start: 2025-06-27 | End: 2025-06-27

## 2025-06-27 RX ORDER — ONDANSETRON 4 MG/1
4 TABLET, ORALLY DISINTEGRATING ORAL EVERY 6 HOURS PRN
COMMUNITY

## 2025-06-27 RX ORDER — SODIUM CHLORIDE 0.9 % (FLUSH) 0.9 %
10 SYRINGE (ML) INJECTION AS NEEDED
Status: DISCONTINUED | OUTPATIENT
Start: 2025-06-27 | End: 2025-06-28 | Stop reason: HOSPADM

## 2025-06-27 RX ORDER — HYDRALAZINE HYDROCHLORIDE 25 MG/1
25 TABLET, FILM COATED ORAL 3 TIMES DAILY
COMMUNITY

## 2025-06-27 RX ORDER — NALOXONE HYDROCHLORIDE 0.4 MG/ML
0.4 INJECTION, SOLUTION INTRAMUSCULAR; INTRAVENOUS; SUBCUTANEOUS DAILY PRN
COMMUNITY

## 2025-06-27 RX ORDER — SODIUM CHLORIDE 9 MG/ML
40 INJECTION, SOLUTION INTRAVENOUS AS NEEDED
Status: DISCONTINUED | OUTPATIENT
Start: 2025-06-27 | End: 2025-06-28 | Stop reason: HOSPADM

## 2025-06-27 RX ORDER — ONDANSETRON 2 MG/ML
4 INJECTION INTRAMUSCULAR; INTRAVENOUS EVERY 6 HOURS PRN
Status: DISCONTINUED | OUTPATIENT
Start: 2025-06-27 | End: 2025-06-28 | Stop reason: HOSPADM

## 2025-06-27 RX ORDER — POLYETHYLENE GLYCOL 3350 17 G/17G
POWDER, FOR SOLUTION ORAL SEE ADMIN INSTRUCTIONS
COMMUNITY

## 2025-06-27 RX ORDER — BISACODYL 10 MG
10 SUPPOSITORY, RECTAL RECTAL DAILY PRN
Status: DISCONTINUED | OUTPATIENT
Start: 2025-06-27 | End: 2025-06-28 | Stop reason: HOSPADM

## 2025-06-27 RX ORDER — AMOXICILLIN 250 MG
2 CAPSULE ORAL 2 TIMES DAILY PRN
Status: DISCONTINUED | OUTPATIENT
Start: 2025-06-27 | End: 2025-06-28 | Stop reason: HOSPADM

## 2025-06-27 RX ORDER — POLYETHYLENE GLYCOL 3350 17 G/17G
17 POWDER, FOR SOLUTION ORAL DAILY PRN
Status: DISCONTINUED | OUTPATIENT
Start: 2025-06-27 | End: 2025-06-28 | Stop reason: HOSPADM

## 2025-06-27 RX ORDER — SODIUM CHLORIDE 0.9 % (FLUSH) 0.9 %
10 SYRINGE (ML) INJECTION EVERY 12 HOURS SCHEDULED
Status: DISCONTINUED | OUTPATIENT
Start: 2025-06-27 | End: 2025-06-28 | Stop reason: HOSPADM

## 2025-06-27 RX ORDER — BISACODYL 10 MG
10 SUPPOSITORY, RECTAL RECTAL DAILY PRN
COMMUNITY

## 2025-06-27 RX ORDER — IOPAMIDOL 755 MG/ML
100 INJECTION, SOLUTION INTRAVASCULAR
Status: COMPLETED | OUTPATIENT
Start: 2025-06-27 | End: 2025-06-27

## 2025-06-27 RX ORDER — ATORVASTATIN CALCIUM 80 MG/1
80 TABLET, FILM COATED ORAL DAILY
COMMUNITY

## 2025-06-27 RX ORDER — DOCUSATE SODIUM 100 MG/1
100 CAPSULE, LIQUID FILLED ORAL 2 TIMES DAILY
COMMUNITY

## 2025-06-27 RX ADMIN — ASPIRIN 81 MG CHEWABLE TABLET 324 MG: 81 TABLET CHEWABLE at 20:19

## 2025-06-27 RX ADMIN — IOPAMIDOL 100 ML: 755 INJECTION, SOLUTION INTRAVENOUS at 18:37

## 2025-06-27 RX ADMIN — Medication 10 ML: at 21:22

## 2025-06-27 NOTE — ED PROVIDER NOTES
Subjective   History of Present Illness  75-year-old female presents from hospitals rehab.  She was reported to have elevated blood pressure.  She denies headache.  She reports she had some shortness of breath after she got in the ambulance and her chest felt heavy during the ride.  She did not report shortness of breath prior to that.  Patient is noted to be somewhat confused and giving history.  Did receive a call from the facility after evaluation.  They reported that patient had elevated D-dimer and tach complained of shortness of breath was being sent to evaluate for PE.  They report that she does have some baseline confusion and this is unchanged.  Review of Systems    Past Medical History:   Diagnosis Date    Anxiety     Depression     Elevated cholesterol     Headache        Allergies   Allergen Reactions    Topamax [Topiramate] Hives and Other (See Comments)     CAUSED KIDNEY STONES    Codeine Hives and Itching       Past Surgical History:   Procedure Laterality Date    SHOULDER ARTHROSCOPY Left     ROTATOR CUFF REPAIR    SHOULDER ARTHROSCOPY W/ ROTATOR CUFF REPAIR Right 8/24/2016    Procedure: RT SHOULDER MANIPULATION, RT SHOULDER ARTHROSCOPY WITH ROTATOR CUFF REPAIR, BANKART REPAIR  AND ACROMIOPLASTY ;  Surgeon: Francesco Wall MD;  Location: [facility] [place];  Service:        Family History   Problem Relation Age of Onset    Heart failure Mother        Social History     Socioeconomic History    Marital status:    Tobacco Use    Smoking status: Never    Smokeless tobacco: Never   Vaping Use    Vaping status: Never Used   Substance and Sexual Activity    Alcohol use: No    Drug use: No    Sexual activity: Not Currently     Prior to Admission medications    Medication Sig Start Date End Date Taking? Authorizing Provider   ALPRAZolam (XANAX) 2 MG tablet Take 1 tablet by mouth Every 6 (Six) Hours As Needed. 6/24/16   Provider, MD Tawanda   amLODIPine (NORVASC) 5 MG tablet Take 1 tablet by mouth Daily for  30 days. 6/25/25 7/25/25  Nasra Weaver MD   amoxicillin-clavulanate (AUGMENTIN) 875-125 MG per tablet Take 1 tablet by mouth 2 (Two) Times a Day for 7 days. 6/24/25 7/1/25  Nasra Weaver MD   escitalopram (LEXAPRO) 20 MG tablet Take 1 tablet by mouth Daily. 10/11/22   Tawanda Rivera MD   HYDROcodone-acetaminophen (NORCO) 5-325 MG per tablet Take 1 tablet by mouth Every 6 (Six) Hours As Needed for Moderate Pain for up to 4 days. 6/24/25 6/28/25  Nasra Weaver MD   oxybutynin XL (DITROPAN XL) 15 MG 24 hr tablet Take 1 tablet by mouth Daily. 10/5/22   Tawanda Rivera MD   rosuvastatin (CRESTOR) 20 MG tablet Take 1 tablet by mouth Daily. 4/13/18   Tawanda Rivera MD           Objective   Physical Exam  75-year-old female awake alert.  Pupils equal round react light.  Extraocular muscles are intact chest clear equal breath sounds cardiovascular rate and rhythm abdomen soft without localizing tenderness mass rebound or guarding extremities without tenderness edema.  She is able to move all 4 neurovasc intact without deficit.  Procedures           ED Course      Results for orders placed or performed during the hospital encounter of 06/27/25   CBC Auto Differential    Collection Time: 06/27/25  5:25 PM    Specimen: Blood   Result Value Ref Range    WBC 15.05 (H) 3.40 - 10.80 10*3/mm3    RBC 4.89 3.77 - 5.28 10*6/mm3    Hemoglobin 15.3 12.0 - 15.9 g/dL    Hematocrit 45.4 34.0 - 46.6 %    MCV 92.8 79.0 - 97.0 fL    MCH 31.3 26.6 - 33.0 pg    MCHC 33.7 31.5 - 35.7 g/dL    RDW 13.0 12.3 - 15.4 %    RDW-SD 44.4 37.0 - 54.0 fl    MPV 10.9 6.0 - 12.0 fL    Platelets 361 140 - 450 10*3/mm3    Neutrophil % 79.7 (H) 42.7 - 76.0 %    Lymphocyte % 12.4 (L) 19.6 - 45.3 %    Monocyte % 6.9 5.0 - 12.0 %    Eosinophil % 0.0 (L) 0.3 - 6.2 %    Basophil % 0.3 0.0 - 1.5 %    Immature Grans % 0.7 (H) 0.0 - 0.5 %    Neutrophils, Absolute 12.01 (H) 1.70 - 7.00 10*3/mm3    Lymphocytes, Absolute 1.86 0.70 - 3.10 10*3/mm3     Monocytes, Absolute 1.04 (H) 0.10 - 0.90 10*3/mm3    Eosinophils, Absolute 0.00 0.00 - 0.40 10*3/mm3    Basophils, Absolute 0.04 0.00 - 0.20 10*3/mm3    Immature Grans, Absolute 0.10 (H) 0.00 - 0.05 10*3/mm3    nRBC 0.0 0.0 - 0.2 /100 WBC   Comprehensive Metabolic Panel    Collection Time: 06/27/25  6:04 PM    Specimen: Blood   Result Value Ref Range    Glucose 122 (H) 65 - 99 mg/dL    BUN 15.7 8.0 - 23.0 mg/dL    Creatinine 0.98 0.57 - 1.00 mg/dL    Sodium 141 136 - 145 mmol/L    Potassium 3.5 3.5 - 5.2 mmol/L    Chloride 103 98 - 107 mmol/L    CO2 22.0 22.0 - 29.0 mmol/L    Calcium 9.5 8.6 - 10.5 mg/dL    Total Protein 6.5 6.0 - 8.5 g/dL    Albumin 4.0 3.5 - 5.2 g/dL    ALT (SGPT) <5 1 - 33 U/L    AST (SGOT) 15 1 - 32 U/L    Alkaline Phosphatase 74 39 - 117 U/L    Total Bilirubin 1.0 0.0 - 1.2 mg/dL    Globulin 2.5 gm/dL    A/G Ratio 1.6 g/dL    BUN/Creatinine Ratio 16.0 7.0 - 25.0    Anion Gap 16.0 (H) 5.0 - 15.0 mmol/L    eGFR 60.7 >60.0 mL/min/1.73   High Sensitivity Troponin T    Collection Time: 06/27/25  6:04 PM    Specimen: Blood   Result Value Ref Range    HS Troponin T 21 (H) <14 ng/L   ECG 12 Lead Dyspnea    Collection Time: 06/27/25  7:07 PM   Result Value Ref Range    QT Interval 395 ms    QTC Interval 447 ms   High Sensitivity Troponin T 1Hr    Collection Time: 06/27/25  7:22 PM    Specimen: Blood   Result Value Ref Range    HS Troponin T 19 (H) <14 ng/L    Troponin T Numeric Delta -2 ng/L    Troponin T % Delta -10 Abnormal if >/= 20%     CT Angiogram Chest Pulmonary Embolism   Final Result   No pulmonary embolus. No acute cardiopulmonary disease.         Electronically Signed: Romeo Zamorano MD     6/27/2025 6:51 PM EDT     Workstation ID: [id number]      XR Chest 1 View   Final Result   Impression:   No acute abnormality         Electronically Signed: James Ty MD     6/27/2025 6:07 PM EDT     Workstation ID: [id number]        Medications   sodium chloride 0.9 % flush 10 mL (has no administration  "in time range)   aspirin chewable tablet 324 mg (has no administration in time range)   iopamidol (ISOVUE-370) 76 % injection 100 mL (100 mL Intravenous Given 6/27/25 1837)     /49   Pulse 81   Temp 97.8 °F (36.6 °C) (Oral)   Resp 20   Ht 162.6 cm (64\")   Wt 64 kg (141 lb 1.5 oz)   SpO2 91%   BMI 24.22 kg/m²                                                    Medical Decision Making  Amount and/or Complexity of Data Reviewed  Labs: ordered.  Radiology: ordered.  ECG/medicine tests: ordered.    Risk  Prescription drug management.    Chart review: Patient had been discharged on the 24th after fall with low back pain rating to right lower extremity.  She was sent to rehab.  Comorbidity: As per past history   Differential: Pulmonary embolus, hypertension, electrolyte abnormality,  My EKG interpretation: Sinus rhythm rate of 77 nonspecific T wave abnormality  Lab: Comprehensive metabolic panel remark for glucose 122 troponin initial 20 1 repeat 19 CBC white count 15 with hemoglobin 15.3 platelet count 361 with 79 segs no bands  My Radiology review and interpretation: Chest x-ray reveals clear lungs normal cardiomediastinal contour no pneumothorax.  Evidence of previous right shoulder prosthesis.  CT chest PE protocol shows no pulmonary embolus.  Lungs are clear.  There is an incidental 1.6 cm splenic artery aneurysm noted.  There is mild loss of height of L1 with evidence of healing noted.  Discussion/treatment: Patient's findings were discussed with her.  She does have some nonspecific T wave change on her EKG.  She will be placed in observation for further cardiac testing.  She has had significant improvement in her blood pressure.  Patient was evaluated using appropriate PPE      Final diagnoses:   Hypertension, unspecified type   Chest pain, unspecified type   Closed compression fracture of body of L1 vertebra       ED Disposition  ED Disposition       ED Disposition   Decision to Admit    Condition   -- "    Comment   --               No follow-up provider specified.       Medication List      No changes were made to your prescriptions during this visit.            Don Hobson MD  06/27/25 1954

## 2025-06-28 ENCOUNTER — APPOINTMENT (OUTPATIENT)
Dept: NUCLEAR MEDICINE | Facility: HOSPITAL | Age: 75
End: 2025-06-28
Payer: MEDICARE

## 2025-06-28 VITALS
BODY MASS INDEX: 24.09 KG/M2 | SYSTOLIC BLOOD PRESSURE: 163 MMHG | DIASTOLIC BLOOD PRESSURE: 64 MMHG | RESPIRATION RATE: 22 BRPM | HEART RATE: 70 BPM | HEIGHT: 64 IN | OXYGEN SATURATION: 94 % | TEMPERATURE: 98.3 F | WEIGHT: 141.09 LBS

## 2025-06-28 LAB
ADV 40+41 DNA STL QL NAA+NON-PROBE: NOT DETECTED
ANION GAP SERPL CALCULATED.3IONS-SCNC: 12.1 MMOL/L (ref 5–15)
ASTRO TYP 1-8 RNA STL QL NAA+NON-PROBE: NOT DETECTED
BACTERIA UR QL AUTO: ABNORMAL /HPF
BASOPHILS # BLD AUTO: 0.06 10*3/MM3 (ref 0–0.2)
BASOPHILS NFR BLD AUTO: 0.4 % (ref 0–1.5)
BH CV NUCLEAR PRIOR STUDY: 3
BH CV REST NUCLEAR ISOTOPE DOSE: 11 MCI
BH CV STRESS BP STAGE 1: NORMAL
BH CV STRESS BP STAGE 2: NORMAL
BH CV STRESS BP STAGE 3: NORMAL
BH CV STRESS BP STAGE 4: NORMAL
BH CV STRESS COMMENTS STAGE 1: NORMAL
BH CV STRESS COMMENTS STAGE 2: NORMAL
BH CV STRESS DOSE REGADENOSON STAGE 1: 0.4
BH CV STRESS DURATION MIN STAGE 1: 0
BH CV STRESS DURATION MIN STAGE 2: 4
BH CV STRESS DURATION SEC STAGE 1: 10
BH CV STRESS DURATION SEC STAGE 2: 0
BH CV STRESS HR STAGE 1: 108
BH CV STRESS HR STAGE 2: 111
BH CV STRESS HR STAGE 3: 106
BH CV STRESS HR STAGE 4: 98
BH CV STRESS NUCLEAR ISOTOPE DOSE: 32 MCI
BH CV STRESS PROTOCOL 1: NORMAL
BH CV STRESS RECOVERY BP: NORMAL MMHG
BH CV STRESS RECOVERY HR: 136 BPM
BH CV STRESS STAGE 1: 1
BH CV STRESS STAGE 2: 2
BH CV STRESS STAGE 3: 3
BH CV STRESS STAGE 4: 4
BILIRUB UR QL STRIP: NEGATIVE
BUN SERPL-MCNC: 14 MG/DL (ref 8–23)
BUN/CREAT SERPL: 14.7 (ref 7–25)
C CAYETANENSIS DNA STL QL NAA+NON-PROBE: NOT DETECTED
C COLI+JEJ+UPSA DNA STL QL NAA+NON-PROBE: NOT DETECTED
CALCIUM SPEC-SCNC: 9.8 MG/DL (ref 8.6–10.5)
CHLORIDE SERPL-SCNC: 102 MMOL/L (ref 98–107)
CHOLEST SERPL-MCNC: 161 MG/DL (ref 0–200)
CLARITY UR: CLEAR
CO2 SERPL-SCNC: 27.9 MMOL/L (ref 22–29)
COLOR UR: YELLOW
CREAT SERPL-MCNC: 0.95 MG/DL (ref 0.57–1)
CRYPTOSP DNA STL QL NAA+NON-PROBE: NOT DETECTED
DEPRECATED RDW RBC AUTO: 47.6 FL (ref 37–54)
E HISTOLYT DNA STL QL NAA+NON-PROBE: NOT DETECTED
EAEC PAA PLAS AGGR+AATA ST NAA+NON-PRB: NOT DETECTED
EC STX1+STX2 GENES STL QL NAA+NON-PROBE: NOT DETECTED
EGFRCR SERPLBLD CKD-EPI 2021: 63 ML/MIN/1.73
EOSINOPHIL # BLD AUTO: 0.01 10*3/MM3 (ref 0–0.4)
EOSINOPHIL NFR BLD AUTO: 0.1 % (ref 0.3–6.2)
EPEC EAE GENE STL QL NAA+NON-PROBE: NOT DETECTED
ERYTHROCYTE [DISTWIDTH] IN BLOOD BY AUTOMATED COUNT: 13.2 % (ref 12.3–15.4)
ETEC LTA+ST1A+ST1B TOX ST NAA+NON-PROBE: NOT DETECTED
G LAMBLIA DNA STL QL NAA+NON-PROBE: NOT DETECTED
GLUCOSE SERPL-MCNC: 114 MG/DL (ref 65–99)
GLUCOSE UR STRIP-MCNC: NEGATIVE MG/DL
HBA1C MFR BLD: 5.76 % (ref 4.8–5.6)
HCT VFR BLD AUTO: 45 % (ref 34–46.6)
HDLC SERPL-MCNC: 54 MG/DL (ref 40–60)
HGB BLD-MCNC: 14.6 G/DL (ref 12–15.9)
HGB UR QL STRIP.AUTO: ABNORMAL
HYALINE CASTS UR QL AUTO: ABNORMAL /LPF
IMM GRANULOCYTES # BLD AUTO: 0.08 10*3/MM3 (ref 0–0.05)
IMM GRANULOCYTES NFR BLD AUTO: 0.5 % (ref 0–0.5)
KETONES UR QL STRIP: ABNORMAL
LDLC SERPL CALC-MCNC: 91 MG/DL (ref 0–100)
LDLC/HDLC SERPL: 1.67 {RATIO}
LEUKOCYTE ESTERASE UR QL STRIP.AUTO: NEGATIVE
LYMPHOCYTES # BLD AUTO: 1.79 10*3/MM3 (ref 0.7–3.1)
LYMPHOCYTES NFR BLD AUTO: 12.2 % (ref 19.6–45.3)
MAXIMAL PREDICTED HEART RATE: 146 BPM
MCH RBC QN AUTO: 31.1 PG (ref 26.6–33)
MCHC RBC AUTO-ENTMCNC: 32.4 G/DL (ref 31.5–35.7)
MCV RBC AUTO: 95.7 FL (ref 79–97)
MONOCYTES # BLD AUTO: 1.04 10*3/MM3 (ref 0.1–0.9)
MONOCYTES NFR BLD AUTO: 7.1 % (ref 5–12)
NEUTROPHILS NFR BLD AUTO: 11.67 10*3/MM3 (ref 1.7–7)
NEUTROPHILS NFR BLD AUTO: 79.7 % (ref 42.7–76)
NITRITE UR QL STRIP: NEGATIVE
NOROVIRUS GI+II RNA STL QL NAA+NON-PROBE: NOT DETECTED
NRBC BLD AUTO-RTO: 0 /100 WBC (ref 0–0.2)
P SHIGELLOIDES DNA STL QL NAA+NON-PROBE: NOT DETECTED
PERCENT MAX PREDICTED HR: 67.12 %
PH UR STRIP.AUTO: 6.5 [PH] (ref 5–8)
PLATELET # BLD AUTO: 324 10*3/MM3 (ref 140–450)
PMV BLD AUTO: 11.3 FL (ref 6–12)
POTASSIUM SERPL-SCNC: 4.6 MMOL/L (ref 3.5–5.2)
PROT UR QL STRIP: NEGATIVE
QT INTERVAL: 395 MS
QTC INTERVAL: 447 MS
RBC # BLD AUTO: 4.7 10*6/MM3 (ref 3.77–5.28)
RBC # UR STRIP: ABNORMAL /HPF
REF LAB TEST METHOD: ABNORMAL
RVA RNA STL QL NAA+NON-PROBE: NOT DETECTED
S ENT+BONG DNA STL QL NAA+NON-PROBE: NOT DETECTED
SAPO I+II+IV+V RNA STL QL NAA+NON-PROBE: NOT DETECTED
SHIGELLA SP+EIEC IPAH ST NAA+NON-PROBE: NOT DETECTED
SODIUM SERPL-SCNC: 142 MMOL/L (ref 136–145)
SP GR UR STRIP: 1.02 (ref 1–1.03)
SPECT HRT GATED+EF W RNC IV: 73 %
SQUAMOUS #/AREA URNS HPF: ABNORMAL /HPF
STRESS BASELINE BP: NORMAL MMHG
STRESS BASELINE HR: 103 BPM
STRESS PERCENT HR: 79 %
STRESS POST PEAK BP: NORMAL MMHG
STRESS POST PEAK HR: 98 BPM
STRESS TARGET HR: 124 BPM
T3FREE SERPL-MCNC: 2.32 PG/ML (ref 2–4.4)
TRIGL SERPL-MCNC: 83 MG/DL (ref 0–150)
UROBILINOGEN UR QL STRIP: ABNORMAL
V CHOL+PARA+VUL DNA STL QL NAA+NON-PROBE: NOT DETECTED
V CHOLERAE DNA STL QL NAA+NON-PROBE: NOT DETECTED
VLDLC SERPL-MCNC: 16 MG/DL (ref 5–40)
WBC # UR STRIP: ABNORMAL /HPF
WBC NRBC COR # BLD AUTO: 14.65 10*3/MM3 (ref 3.4–10.8)
Y ENTEROCOL DNA STL QL NAA+NON-PROBE: NOT DETECTED

## 2025-06-28 PROCEDURE — 81001 URINALYSIS AUTO W/SCOPE: CPT | Performed by: NURSE PRACTITIONER

## 2025-06-28 PROCEDURE — 93017 CV STRESS TEST TRACING ONLY: CPT

## 2025-06-28 PROCEDURE — 25010000002 REGADENOSON 0.4 MG/5ML SOLUTION: Performed by: EMERGENCY MEDICINE

## 2025-06-28 PROCEDURE — 80048 BASIC METABOLIC PNL TOTAL CA: CPT | Performed by: EMERGENCY MEDICINE

## 2025-06-28 PROCEDURE — 34310000005 TECHNETIUM TETROFOSMIN KIT: Performed by: EMERGENCY MEDICINE

## 2025-06-28 PROCEDURE — G0378 HOSPITAL OBSERVATION PER HR: HCPCS

## 2025-06-28 PROCEDURE — 78452 HT MUSCLE IMAGE SPECT MULT: CPT | Performed by: INTERNAL MEDICINE

## 2025-06-28 PROCEDURE — 93018 CV STRESS TEST I&R ONLY: CPT | Performed by: INTERNAL MEDICINE

## 2025-06-28 PROCEDURE — 85025 COMPLETE CBC W/AUTO DIFF WBC: CPT | Performed by: EMERGENCY MEDICINE

## 2025-06-28 PROCEDURE — 87507 IADNA-DNA/RNA PROBE TQ 12-25: CPT | Performed by: NURSE PRACTITIONER

## 2025-06-28 PROCEDURE — 84481 FREE ASSAY (FT-3): CPT | Performed by: NURSE PRACTITIONER

## 2025-06-28 PROCEDURE — 93016 CV STRESS TEST SUPVJ ONLY: CPT | Performed by: INTERNAL MEDICINE

## 2025-06-28 PROCEDURE — 80061 LIPID PANEL: CPT | Performed by: NURSE PRACTITIONER

## 2025-06-28 PROCEDURE — A9502 TC99M TETROFOSMIN: HCPCS | Performed by: EMERGENCY MEDICINE

## 2025-06-28 PROCEDURE — 78452 HT MUSCLE IMAGE SPECT MULT: CPT

## 2025-06-28 PROCEDURE — 83036 HEMOGLOBIN GLYCOSYLATED A1C: CPT | Performed by: NURSE PRACTITIONER

## 2025-06-28 RX ORDER — HYDRALAZINE HYDROCHLORIDE 25 MG/1
25 TABLET, FILM COATED ORAL 3 TIMES DAILY
Status: DISCONTINUED | OUTPATIENT
Start: 2025-06-28 | End: 2025-06-28 | Stop reason: HOSPADM

## 2025-06-28 RX ORDER — ALPRAZOLAM 1 MG/1
2 TABLET ORAL EVERY 6 HOURS PRN
Status: DISCONTINUED | OUTPATIENT
Start: 2025-06-28 | End: 2025-06-28 | Stop reason: HOSPADM

## 2025-06-28 RX ORDER — ACETAMINOPHEN 325 MG/1
650 TABLET ORAL EVERY 4 HOURS PRN
Status: DISCONTINUED | OUTPATIENT
Start: 2025-06-28 | End: 2025-06-28 | Stop reason: HOSPADM

## 2025-06-28 RX ORDER — OXYBUTYNIN CHLORIDE 5 MG/1
5 TABLET, EXTENDED RELEASE ORAL 3 TIMES DAILY
Status: DISCONTINUED | OUTPATIENT
Start: 2025-06-28 | End: 2025-06-28 | Stop reason: HOSPADM

## 2025-06-28 RX ORDER — REGADENOSON 0.08 MG/ML
0.4 INJECTION, SOLUTION INTRAVENOUS
Status: COMPLETED | OUTPATIENT
Start: 2025-06-28 | End: 2025-06-28

## 2025-06-28 RX ORDER — AMLODIPINE BESYLATE 5 MG/1
5 TABLET ORAL
Status: DISCONTINUED | OUTPATIENT
Start: 2025-06-28 | End: 2025-06-28 | Stop reason: HOSPADM

## 2025-06-28 RX ORDER — OXYBUTYNIN CHLORIDE 5 MG/1
5 TABLET ORAL 3 TIMES DAILY
COMMUNITY

## 2025-06-28 RX ORDER — DOCUSATE SODIUM 100 MG/1
100 CAPSULE, LIQUID FILLED ORAL 2 TIMES DAILY
Status: DISCONTINUED | OUTPATIENT
Start: 2025-06-28 | End: 2025-06-28 | Stop reason: HOSPADM

## 2025-06-28 RX ORDER — ESCITALOPRAM OXALATE 10 MG/1
20 TABLET ORAL DAILY
Status: DISCONTINUED | OUTPATIENT
Start: 2025-06-28 | End: 2025-06-28 | Stop reason: HOSPADM

## 2025-06-28 RX ORDER — HYDROCODONE BITARTRATE AND ACETAMINOPHEN 5; 325 MG/1; MG/1
1 TABLET ORAL EVERY 6 HOURS PRN
Refills: 0 | Status: DISCONTINUED | OUTPATIENT
Start: 2025-06-28 | End: 2025-06-28 | Stop reason: HOSPADM

## 2025-06-28 RX ORDER — ATORVASTATIN CALCIUM 40 MG/1
80 TABLET, FILM COATED ORAL DAILY
Status: DISCONTINUED | OUTPATIENT
Start: 2025-06-28 | End: 2025-06-28 | Stop reason: HOSPADM

## 2025-06-28 RX ORDER — AMLODIPINE BESYLATE 5 MG/1
10 TABLET ORAL
Qty: 60 TABLET | Refills: 0 | Status: SHIPPED | OUTPATIENT
Start: 2025-06-28 | End: 2025-07-28

## 2025-06-28 RX ADMIN — ESCITALOPRAM 20 MG: 10 TABLET, FILM COATED ORAL at 12:37

## 2025-06-28 RX ADMIN — OXYBUTYNIN CHLORIDE 5 MG: 5 TABLET, EXTENDED RELEASE ORAL at 12:37

## 2025-06-28 RX ADMIN — HYDRALAZINE HYDROCHLORIDE 25 MG: 25 TABLET ORAL at 12:36

## 2025-06-28 RX ADMIN — REGADENOSON 0.4 MG: 0.08 INJECTION, SOLUTION INTRAVENOUS at 12:06

## 2025-06-28 RX ADMIN — HYDROCODONE BITARTRATE AND ACETAMINOPHEN 1 TABLET: 5; 325 TABLET ORAL at 13:48

## 2025-06-28 RX ADMIN — TETROFOSMIN 1 DOSE: 1.38 INJECTION, POWDER, LYOPHILIZED, FOR SOLUTION INTRAVENOUS at 12:06

## 2025-06-28 RX ADMIN — ATORVASTATIN CALCIUM 80 MG: 40 TABLET, FILM COATED ORAL at 12:38

## 2025-06-28 RX ADMIN — AMLODIPINE BESYLATE 5 MG: 5 TABLET ORAL at 12:37

## 2025-06-28 RX ADMIN — Medication 10 ML: at 12:38

## 2025-06-28 RX ADMIN — TETROFOSMIN 1 DOSE: 1.38 INJECTION, POWDER, LYOPHILIZED, FOR SOLUTION INTRAVENOUS at 07:55

## 2025-06-28 NOTE — DISCHARGE SUMMARY
[facility]    Mina Padilla PA    CHIEF COMPLAINT:     Hypertension    HISTORY OF PRESENT ILLNESS:    HPI    75-year-old female presents from [facility] rehab. She was reported to have elevated blood pressure. She denies headache. She reports she had some shortness of breath after she got in the ambulance and her chest felt heavy during the ride. She did not report shortness of breath prior to that. Patient is noted to be somewhat confused and giving history. Did receive a call from the facility after evaluation. They reported that patient had elevated D-dimer and tach complained of shortness of breath was being sent to evaluate for PE. They report that she does have some baseline confusion and this is unchanged.     Past Medical History:   Diagnosis Date    Anxiety     Depression     Elevated cholesterol     Headache      Past Surgical History:   Procedure Laterality Date    SHOULDER ARTHROSCOPY Left     ROTATOR CUFF REPAIR    SHOULDER ARTHROSCOPY W/ ROTATOR CUFF REPAIR Right 8/24/2016    Procedure: RT SHOULDER MANIPULATION, RT SHOULDER ARTHROSCOPY WITH ROTATOR CUFF REPAIR, BANKART REPAIR  AND ACROMIOPLASTY ;  Surgeon: Francesco Wall MD;  [place] [facility] [place] [facility] [place]  Service:      Family History   Problem Relation Age of Onset    Heart failure Mother      Social History     Tobacco Use    Smoking status: Never    Smokeless tobacco: Never   Vaping Use    Vaping status: Never Used   Substance Use Topics    Alcohol use: No    Drug use: No     Medications Prior to Admission   Medication Sig Dispense Refill Last Dose/Taking    acetaminophen (TYLENOL) 325 MG tablet Take 2 tablets by mouth Every 4 (Four) Hours As Needed for Mild Pain.   6/25/2025    ALPRAZolam (XANAX) 1 MG tablet Take 2 tablets by mouth Every 6 (Six) Hours As Needed for Anxiety.   Taking As Needed    atorvastatin (LIPITOR) 80 MG tablet Take 1 tablet by mouth Daily.   6/26/2025 at 10:36 AM    bisacodyl (Bisacodyl Laxative) 10 MG  suppository Insert 1 suppository into the rectum Daily As Needed for Constipation.   Taking As Needed    docusate sodium (COLACE) 100 MG capsule Take 1 capsule by mouth 2 (Two) Times a Day.   6/27/2025    escitalopram (LEXAPRO) 10 MG tablet Take 2 tablets by mouth Daily.   6/27/2025 at 10:44 AM    hydrALAZINE (APRESOLINE) 25 MG tablet Take 1 tablet by mouth 3 (Three) Times a Day.   6/27/2025 at  3:27 PM    HYDROcodone-acetaminophen (NORCO) 5-325 MG per tablet Take 1 tablet by mouth Every 6 (Six) Hours As Needed for Moderate Pain for up to 4 days. 16 tablet 0 6/27/2025 at 12:45 PM    ondansetron ODT (ZOFRAN-ODT) 4 MG disintegrating tablet Place 1 tablet on the tongue Every 6 (Six) Hours As Needed for Nausea or Vomiting.   6/27/2025 at 12:45 PM    oxybutynin XL (DITROPAN-XL) 5 MG 24 hr tablet Take 1 tablet by mouth 3 times a day.   6/27/2025 at  3:27 PM    polyethylene glycol (MiraLax) 17 GM/SCOOP powder See Admin Instructions. Give 17 G po bid in 8 ounces of water twice daily. Until bowel movement occurs then change to once daily.   6/27/2025 at 10:44 AM    sorbitol 70 % solution solution Take 30 mL by mouth 2 (Two) Times a Day As Needed.   6/27/2025 at 10:44 AM    [DISCONTINUED] amLODIPine (NORVASC) 5 MG tablet Take 1 tablet by mouth Daily for 30 days. 30 tablet 0 6/27/2025 at  5:30 AM    naloxone (NARCAN) 0.4 MG/ML injection Infuse 1 mL into a venous catheter Daily As Needed for Opioid Reversal.       sodium phosphate (FLEET) 7-19 GM/197ML enema ADULT enema Insert 1 enema into the rectum 1 (One) Time.        Allergies:  Topamax [topiramate] and Codeine      There is no immunization history on file for this patient.        REVIEW OF SYSTEMS:    Review of Systems   Unable to perform ROS: Dementia   Constitutional: Positive for malaise/fatigue.   HENT: Negative.     Eyes: Negative.    Cardiovascular: Negative.  Negative for chest pain and dyspnea on exertion.   Respiratory: Negative.     Endocrine: Negative.    Skin:  Negative.    Gastrointestinal:  Positive for nausea.   Genitourinary: Negative.    Neurological:  Positive for weakness.   Psychiatric/Behavioral:  Positive for memory loss.        Vital Signs  Temp:  [97.8 °F (36.6 °C)-98.9 °F (37.2 °C)] 98.3 °F (36.8 °C)  Heart Rate:  [63-89] 70  Resp:  [16-24] 22  BP: (138-178)/(49-86) 163/64          Physical Exam:  Physical Exam  Vitals and nursing note reviewed.   HENT:      Head: Normocephalic and atraumatic.      Right Ear: External ear normal.      Nose: Nose normal.      Mouth/Throat:      Pharynx: Oropharynx is clear.   Eyes:      Conjunctiva/sclera: Conjunctivae normal.      Pupils: Pupils are equal, round, and reactive to light.   Cardiovascular:      Rate and Rhythm: Normal rate.      Pulses: Normal pulses.   Pulmonary:      Effort: Pulmonary effort is normal.   Abdominal:      General: Bowel sounds are normal.   Musculoskeletal:         General: Normal range of motion.      Cervical back: Normal range of motion.   Skin:     General: Skin is warm.      Capillary Refill: Capillary refill takes less than 2 seconds.   Neurological:      Mental Status: She is alert. Mental status is at baseline.      Motor: Weakness present.   Psychiatric:         Mood and Affect: Mood normal.         Behavior: Behavior normal.         Thought Content: Thought content normal.         Cognition and Memory: Memory is impaired.         Judgment: Judgment normal.         Emotional Behavior:    wnl   Debilities:   none  Results Review:    I reviewed the patient's new clinical results.  Lab Results (most recent)       Procedure Component Value Units Date/Time    Gastrointestinal Panel, PCR - Stool, Per Rectum [[id number]] Collected: 06/28/25 0940    Specimen: Stool from Per Rectum Updated: 06/28/25 0948    Urinalysis, Microscopic Only - Urine, Clean Catch [[id number]]  (Abnormal) Collected: 06/28/25 0820    Specimen: Urine, Clean Catch Updated: 06/28/25 0855     RBC, UA 6-10 /HPF      WBC, UA 0-2  /HPF      Comment: Urine culture not indicated.        Bacteria, UA None Seen /HPF      Squamous Epithelial Cells, UA 0-2 /HPF      Hyaline Casts, UA None Seen /LPF      Methodology Automated Microscopy    Urinalysis With Culture If Indicated - Urine, Clean Catch [[id number]]  (Abnormal) Collected: 06/28/25 0820    Specimen: Urine, Clean Catch Updated: 06/28/25 0850     Color, UA Yellow     Appearance, UA Clear     pH, UA 6.5     Specific Gravity, UA 1.024     Glucose, UA Negative     Ketones, UA 15 mg/dL (1+)     Bilirubin, UA Negative     Blood, UA Trace     Protein, UA Negative     Leuk Esterase, UA Negative     Nitrite, UA Negative     Urobilinogen, UA 0.2 E.U./dL    Narrative:      In absence of clinical symptoms, the presence of pyuria, bacteria, and/or nitrites on the urinalysis result does not correlate with infection.    Lipid Panel [[id number]] Collected: 06/28/25 0505    Specimen: Blood Updated: 06/28/25 0815     Total Cholesterol 161 mg/dL      Triglycerides 83 mg/dL      HDL Cholesterol 54 mg/dL      LDL Cholesterol  91 mg/dL      VLDL Cholesterol 16 mg/dL      LDL/HDL Ratio 1.67    Narrative:      Cholesterol Reference Ranges  (U.S. Department of Health and Human Services ATP III Classifications)    Desirable          <200 mg/dL  Borderline High    200-239 mg/dL  High Risk          >240 mg/dL      Triglyceride Reference Ranges  (U.S. Department of Health and Human Services ATP III Classifications)    Normal           <150 mg/dL  Borderline High  150-199 mg/dL  High             200-499 mg/dL  Very High        >500 mg/dL    HDL Reference Ranges  (U.S. Department of Health and Human Services ATP III Classifications)    Low     <40 mg/dl (major risk factor for CHD)  High    >60 mg/dl ('negative' risk factor for CHD)        LDL Reference Ranges  (U.S. Department of Health and Human Services ATP III Classifications)    Optimal          <100 mg/dL  Near Optimal     100-129 mg/dL  Borderline High  130-159  mg/dL  High             160-189 mg/dL  Very High        >189 mg/dL    LDL is calculated using the NIH LDL-C calculation.      Hemoglobin A1c [[id number]]  (Abnormal) Collected: 06/28/25 0505    Specimen: Blood Updated: 06/28/25 0801     Hemoglobin A1C 5.76 %     Narrative:      Hemoglobin A1C Ranges:    Increased Risk for Diabetes  5.7% to 6.4%  Diabetes                     >= 6.5%  Diabetic Goal                < 7.0%    T3, Free [[id number]] Collected: 06/28/25 0505    Specimen: Blood Updated: 06/28/25 0711    Basic Metabolic Panel [[id number]]  (Abnormal) Collected: 06/28/25 0505    Specimen: Blood Updated: 06/28/25 0657     Glucose 114 mg/dL      BUN 14.0 mg/dL      Creatinine 0.95 mg/dL      Sodium 142 mmol/L      Potassium 4.6 mmol/L      Chloride 102 mmol/L      CO2 27.9 mmol/L      Calcium 9.8 mg/dL      BUN/Creatinine Ratio 14.7     Anion Gap 12.1 mmol/L      eGFR 63.0 mL/min/1.73     Narrative:      GFR Categories in Chronic Kidney Disease (CKD)              GFR Category          GFR (mL/min/1.73)    Interpretation  G1                    90 or greater        Normal or high (1)  G2                    60-89                Mild decrease (1)  G3a                   45-59                Mild to moderate decrease  G3b                   30-44                Moderate to severe decrease  G4                    15-29                Severe decrease  G5                    14 or less           Kidney failure    (1)In the absence of evidence of kidney disease, neither GFR category G1 or G2 fulfill the criteria for CKD.    eGFR calculation 2021 CKD-EPI creatinine equation, which does not include race as a factor    CBC Auto Differential [[id number]]  (Abnormal) Collected: 06/28/25 0505    Specimen: Blood Updated: 06/28/25 0616     WBC 14.65 10*3/mm3      RBC 4.70 10*6/mm3      Hemoglobin 14.6 g/dL      Hematocrit 45.0 %      MCV 95.7 fL      MCH 31.1 pg      MCHC 32.4 g/dL      RDW 13.2 %      RDW-SD 47.6 fl      MPV 11.3 fL       Platelets 324 10*3/mm3      Neutrophil % 79.7 %      Lymphocyte % 12.2 %      Monocyte % 7.1 %      Eosinophil % 0.1 %      Basophil % 0.4 %      Immature Grans % 0.5 %      Neutrophils, Absolute 11.67 10*3/mm3      Lymphocytes, Absolute 1.79 10*3/mm3      Monocytes, Absolute 1.04 10*3/mm3      Eosinophils, Absolute 0.01 10*3/mm3      Basophils, Absolute 0.06 10*3/mm3      Immature Grans, Absolute 0.08 10*3/mm3      nRBC 0.0 /100 WBC     High Sensitivity Troponin T 1Hr [[id number]]  (Abnormal) Collected: 06/27/25 1922    Specimen: Blood Updated: 06/27/25 1945     HS Troponin T 19 ng/L      Troponin T Numeric Delta -2 ng/L      Troponin T % Delta -10    Narrative:      High Sensitive Troponin T Reference Range:  <14.0 ng/L- Negative Female for AMI  <22.0 ng/L- Negative Male for AMI  >=14 - Abnormal Female indicating possible myocardial injury.  >=22 - Abnormal Male indicating possible myocardial injury.   Clinicians would have to utilize clinical acumen, EKG, Troponin, and serial changes to determine if it is an Acute Myocardial Infarction or myocardial injury due to an underlying chronic condition.         Comprehensive Metabolic Panel [[id number]]  (Abnormal) Collected: 06/27/25 1804    Specimen: Blood Updated: 06/27/25 1836     Glucose 122 mg/dL      BUN 15.7 mg/dL      Creatinine 0.98 mg/dL      Sodium 141 mmol/L      Potassium 3.5 mmol/L      Chloride 103 mmol/L      CO2 22.0 mmol/L      Calcium 9.5 mg/dL      Total Protein 6.5 g/dL      Albumin 4.0 g/dL      ALT (SGPT) <5 U/L      AST (SGOT) 15 U/L      Alkaline Phosphatase 74 U/L      Total Bilirubin 1.0 mg/dL      Globulin 2.5 gm/dL      A/G Ratio 1.6 g/dL      BUN/Creatinine Ratio 16.0     Anion Gap 16.0 mmol/L      eGFR 60.7 mL/min/1.73     Narrative:      GFR Categories in Chronic Kidney Disease (CKD)              GFR Category          GFR (mL/min/1.73)    Interpretation  G1                    90 or greater        Normal or high (1)  G2                     60-89                Mild decrease (1)  G3a                   45-59                Mild to moderate decrease  G3b                   30-44                Moderate to severe decrease  G4                    15-29                Severe decrease  G5                    14 or less           Kidney failure    (1)In the absence of evidence of kidney disease, neither GFR category G1 or G2 fulfill the criteria for CKD.    eGFR calculation 2021 CKD-EPI creatinine equation, which does not include race as a factor    High Sensitivity Troponin T [[id number]]  (Abnormal) Collected: 06/27/25 1804    Specimen: Blood Updated: 06/27/25 1833     HS Troponin T 21 ng/L     Narrative:      High Sensitive Troponin T Reference Range:  <14.0 ng/L- Negative Female for AMI  <22.0 ng/L- Negative Male for AMI  >=14 - Abnormal Female indicating possible myocardial injury.  >=22 - Abnormal Male indicating possible myocardial injury.   Clinicians would have to utilize clinical acumen, EKG, Troponin, and serial changes to determine if it is an Acute Myocardial Infarction or myocardial injury due to an underlying chronic condition.         CBC & Differential [[id number]]  (Abnormal) Collected: 06/27/25 1725    Specimen: Blood Updated: 06/27/25 1734    Narrative:      The following orders were created for panel order CBC & Differential.  Procedure                               Abnormality         Status                     ---------                               -----------         ------                     CBC Auto Differential[[id number]]        Abnormal            Final result                 Please view results for these tests on the individual orders.    CBC Auto Differential [[id number]]  (Abnormal) Collected: 06/27/25 1725    Specimen: Blood Updated: 06/27/25 1734     WBC 15.05 10*3/mm3      RBC 4.89 10*6/mm3      Hemoglobin 15.3 g/dL      Hematocrit 45.4 %      MCV 92.8 fL      MCH 31.3 pg      MCHC 33.7 g/dL      RDW 13.0 %      RDW-SD 44.4 fl       MPV 10.9 fL      Platelets 361 10*3/mm3      Neutrophil % 79.7 %      Lymphocyte % 12.4 %      Monocyte % 6.9 %      Eosinophil % 0.0 %      Basophil % 0.3 %      Immature Grans % 0.7 %      Neutrophils, Absolute 12.01 10*3/mm3      Lymphocytes, Absolute 1.86 10*3/mm3      Monocytes, Absolute 1.04 10*3/mm3      Eosinophils, Absolute 0.00 10*3/mm3      Basophils, Absolute 0.04 10*3/mm3      Immature Grans, Absolute 0.10 10*3/mm3      nRBC 0.0 /100 WBC             Imaging Results (Most Recent)       Procedure Component Value Units Date/Time    CT Angiogram Chest Pulmonary Embolism [[id number]] Collected: 06/27/25 1847     Updated: 06/27/25 1853    Narrative:      CT ANGIOGRAM CHEST PULMONARY EMBOLISM    Date of Exam: 6/27/2025 6:24 PM EDT    Indication: Pulmonary Embolism.    Comparison: 8/8/2012    Technique: Axial CT images were obtained of the chest after the uneventful intravenous administration of iodinated contrast utilizing pulmonary embolism protocol.  In addition, a 3-D volume rendered image was created for interpretation.  Sagittal and   coronal reconstructions were performed.  Automated exposure control and iterative reconstruction methods were used.    Findings: Right shoulder replacement. No consolidation. No pneumothorax or pleural effusion. No evidence of aortic dissection. No pulmonary embolus. No mediastinal, perihilar, or axillary adenopathy. The thyroid gland is normal. The subglottic airway is   clear.    Incidental note is made of a 1.6 cm splenic artery aneurysm    Thoracic vertebral body height and alignment are normal. Osteopenia. Mild loss of height of L1 with evidence of healing.      Impression:      No pulmonary embolus. No acute cardiopulmonary disease.      Electronically Signed: Romeo Zamorano MD    6/27/2025 6:51 PM EDT    Workstation ID: [id number]    XR Chest 1 View [[id number]] Collected: 06/27/25 1805     Updated: 06/27/25 1810    Narrative:      XR CHEST 1 VW    Date of Exam:  6/27/2025 5:40 PM EDT    Indication: soa    Comparison: August 2018    Findings:  Lungs are clear. Heart size is normal.    There is mild aortic arch calcification    Right shoulder prosthesis appears intact.      Impression:      Impression:  No acute abnormality      Electronically Signed: James Ty MD    6/27/2025 6:07 PM EDT    Workstation ID: [id number]          reviewed    ECG/EMG Results (most recent)       Procedure Component Value Units Date/Time    Telemetry Scan [[id number]] Resulted: 06/27/25 1903     Updated: 06/27/25 1934    Telemetry Scan [[id number]] Resulted: 06/28/25 0007     Updated: 06/28/25 0119    Telemetry Scan [[id number]] Resulted: 06/27/25 2122     Updated: 06/28/25 0119    Telemetry Scan [[id number]] Resulted: 06/28/25 0347     Updated: 06/28/25 0406    Telemetry Scan [[id number]] Resulted: 06/28/25 0710     Updated: 06/28/25 0737    Telemetry Scan [[id number]] Resulted: 06/28/25 0922     Updated: 06/28/25 1104    ECG 12 Lead Dyspnea [[id number]] Collected: 06/27/25 1907     Updated: 06/28/25 1217     QT Interval 395 ms      QTC Interval 447 ms     Narrative:      HEART RATE=77  bpm  RR Khfizmwu=059  ms  ME Edzpcsem=849  ms  P Horizontal Axis=2  deg  P Front Axis=22  deg  QRSD Interval=92  ms  QT Cgvxyayr=356  ms  UGiG=491  ms  QRS Axis=-5  deg  T Wave Axis=35  deg  - ABNORMAL ECG -  Sinus rhythm  When compared with ECG of 08-Aug-2018 23:20:59,  Non-specific STT wave change  No significant change  Electronically Signed By: Don Hobson (KAYLEIGH) 2025-06-28 12:17:30  Date and Time of Study:2025-06-27 19:07:18          reviewed        Results for orders placed during the hospital encounter of 12/14/22    Adult Transthoracic Echo Complete W/ Cont if Necessary Per Protocol    Interpretation Summary    Left ventricular systolic function is normal. Calculated left ventricular EF = 55% Estimated left ventricular EF = 55%    Left ventricular diastolic function is consistent with (grade I) impaired  relaxation.    Estimated right ventricular systolic pressure from tricuspid regurgitation is normal (<35 mmHg).      Microbiology Results (last 10 days)       Procedure Component Value - Date/Time    Gastrointestinal Panel, PCR - Stool, Per Rectum [[id number]]  (Normal) Collected: 06/28/25 0940    Lab Status: Final result Specimen: Stool from Per Rectum Updated: 06/28/25 1106     Campylobacter Not Detected     Plesiomonas shigelloides Not Detected     Salmonella Not Detected     Vibrio Not Detected     Vibrio cholerae Not Detected     Yersinia enterocolitica Not Detected     Enteroaggregative E. coli (EAEC) Not Detected     Enteropathogenic E. coli (EPEC) Not Detected     Enterotoxigenic E. coli (ETEC) lt/st Not Detected     Shiga-like toxin-producing E. coli (STEC) stx1/stx2 Not Detected     Shigella/Enteroinvasive E. coli (EIEC) Not Detected     Cryptosporidium Not Detected     Cyclospora cayetanensis Not Detected     Entamoeba histolytica Not Detected     Giardia lamblia Not Detected     Adenovirus F40/41 Not Detected     Astrovirus Not Detected     Norovirus GI/GII Not Detected     Rotavirus A Not Detected     Sapovirus (I, II, IV or V) Not Detected    Urine Culture - Urine, Urine, Clean Catch [[id number]] Collected: 06/22/25 0653    Lab Status: Final result Specimen: Urine, Clean Catch Updated: 06/23/25 1057     Urine Culture 25,000 CFU/mL Normal Urogenital Arpita    Narrative:      Colonization of the urinary tract without infection is common. Treatment is discouraged unless the patient is symptomatic, pregnant, or undergoing an invasive urologic procedure.            Assessment & Plan     Chest pain     Chest pain  Lab Results   Component Value Date    TROPONINT 19 (H) 06/27/2025    TROPONINT 21 (H) 06/27/2025   -BNP 1102  -Chest X-ray: on acute findings   -D-dimer 2.23  -CTA: negative for PE  -EKG: nsr   -UA: ketones, trace blood,6-10 RBC   -TSH 1.140, T4 2.06  -Stress Test  ordered  -Telemetry    Hypertension  BP Readings from Last 1 Encounters:   06/28/25 163/64   - Continue amlodipine, hydralazine  - Monitor while admitted    Anxiety Disorder  -Continue Xanax and Lexapro     Overactive Bladder  -Continue oxybutynin    Hyperlipidemia  -Continue statin     I discussed the patients findings and my recommendations with patient.     Discharge Diagnosis:      Chest pain      Hospital Course  Patient is a 75 y.o. female presented with hypertension.  Patient denied chest pain or shortness of breath.  Patient troponin 2119.  BNP 1102.  Chest x-ray showed no acute cardiopulmonary findings.  D-dimer 2.23.  CT chest showed negative for pulmonary embolism.  EKG showed sinus rhythm.  Urinalysis showed ketones and trace blood but otherwise unremarkable.  TSH 1.140 and T42.06.  Patient to repeat thyroid labs outpatient.  Stress test ordered which showed no ischemia.  Patient has underlying dementia with UA showing trace ketones and blood but otherwise unremarkable.  Patient had adjustment of blood pressure medications and advised to go back to [facility] for rehab and monitor blood pressures closely.  Patient follow cardiology and PCP outpatient.  Testing recommendations reviewed patient.  If symptoms worsen patient to call 911 or go to nearest ED.    Past Medical History:     Past Medical History:   Diagnosis Date    Anxiety     Depression     Elevated cholesterol     Headache        Past Surgical History:     Past Surgical History:   Procedure Laterality Date    SHOULDER ARTHROSCOPY Left     ROTATOR CUFF REPAIR    SHOULDER ARTHROSCOPY W/ ROTATOR CUFF REPAIR Right 8/24/2016    Procedure: RT SHOULDER MANIPULATION, RT SHOULDER ARTHROSCOPY WITH ROTATOR CUFF REPAIR, BANKART REPAIR  AND ACROMIOPLASTY ;  Surgeon: Francesco Wall MD;  [place] [facility] [place] [facility] [place]:        Social History:   Social History     Socioeconomic History    Marital status:    Tobacco Use    Smoking status: Never    Smokeless  tobacco: Never   Vaping Use    Vaping status: Never Used   Substance and Sexual Activity    Alcohol use: No    Drug use: No    Sexual activity: Not Currently       Procedures Performed         Consults:   Consults       No orders found for last 30 day(s).            Condition on Discharge:     Stable    Discharge Disposition  Rehab Facility or Unit (DC - External)    Discharge Medications     Discharge Medications        Changes to Medications        Instructions Start Date   amLODIPine 5 MG tablet  Commonly known as: NORVASC  What changed: how much to take   10 mg, Oral, Every 24 Hours Scheduled             Continue These Medications        Instructions Start Date   acetaminophen 325 MG tablet  Commonly known as: TYLENOL   650 mg, Every 4 Hours PRN      ALPRAZolam 1 MG tablet  Commonly known as: XANAX   2 mg, Every 6 Hours PRN      atorvastatin 80 MG tablet  Commonly known as: LIPITOR   80 mg, Daily      Bisacodyl Laxative 10 MG suppository  Generic drug: bisacodyl   10 mg, Daily PRN      docusate sodium 100 MG capsule  Commonly known as: COLACE   100 mg, 2 Times Daily      escitalopram 10 MG tablet  Commonly known as: LEXAPRO   20 mg, Oral, Daily      hydrALAZINE 25 MG tablet  Commonly known as: APRESOLINE   25 mg, 3 Times Daily      HYDROcodone-acetaminophen 5-325 MG per tablet  Commonly known as: NORCO   1 tablet, Oral, Every 6 Hours PRN      MiraLax 17 GM/SCOOP powder  Generic drug: polyethylene glycol   See Admin Instructions, Give 17 G po bid in 8 ounces of water twice daily. Until bowel movement occurs then change to once daily.      naloxone 0.4 MG/ML injection  Commonly known as: NARCAN   0.4 mg, Intravenous, Daily PRN      ondansetron ODT 4 MG disintegrating tablet  Commonly known as: ZOFRAN-ODT   4 mg, Every 6 Hours PRN      oxybutynin XL 5 MG 24 hr tablet  Commonly known as: DITROPAN-XL   5 mg, Oral, 3 times daily      sodium phosphate 7-19 GM/197ML enema ADULT enema   1 enema, Rectal, Once       sorbitol 70 % solution solution   30 mL, 2 Times Daily PRN               Discharge Diet:   Diet Instructions       Diet: Cardiac Diets; Low Sodium (2g); Regular (IDDSI 7); Thin (IDDSI 0)      Discharge Diet: Cardiac Diets    Cardiac Diet: Low Sodium (2g)    Texture: Regular (IDDSI 7)    Fluid Consistency: Thin (IDDSI 0)            Activity at Discharge:   Activity Instructions       Activity as Tolerated      Measure Blood Pressure              Follow-up Appointments  No future appointments.  Additional Instructions for the Follow-ups that You Need to Schedule       Discharge Follow-up with PCP   As directed       Currently Documented PCP:    Mina Padilla PA    PCP Phone Number:    [phone]     Follow Up Details: 7-10 days                Test Results Pending at Discharge  Pending Results       Procedure [Order ID] Specimen - Date/Time    T3, Free [[id number]] Collected: 06/28/25 0505    Specimen: Blood Updated: 06/28/25 0711             Risk for Readmission (LACE) Score: 4 (6/28/2025  6:00 AM)          KENYON Hernández  06/28/25  12:54 EDT        I spent 35 minutes caring for Yaneli on this date of service. This time includes time spent by me in the following activities: reviewing tests, performing a medically appropriate examination and/or evaluation, counseling and educating the patient/family/caregiver, referring and communicating with other health care professionals, documenting information in the medical record, independently interpreting results and communicating that information with the patient/family/caregiver, care coordination, ordering medications, ordering test(s), ordering procedure(s), obtaining a separately obtained history, and reviewing a separately obtained history.

## 2025-06-28 NOTE — PLAN OF CARE
Problem: Adult Inpatient Plan of Care  Goal: Plan of Care Review  Outcome: Met  Flowsheets (Taken 6/28/2025 3804)  Progress: improving  Outcome Evaluation: stress test negative. cleared to dc'd back to [facility]  Plan of Care Reviewed With: patient  Goal: Patient-Specific Goal (Individualized)  Outcome: Met  Goal: Absence of Hospital-Acquired Illness or Injury  Outcome: Met  Goal: Optimal Comfort and Wellbeing  Outcome: Met  Goal: Readiness for Transition of Care  Outcome: Met     Problem: Skin Injury Risk Increased  Goal: Skin Health and Integrity  Outcome: Met     Problem: Violence Risk or Actual  Goal: Anger and Impulse Control  Outcome: Met   Goal Outcome Evaluation:  Plan of Care Reviewed With: patient        Progress: improving  Outcome Evaluation: stress test negative. cleared to dc'd back to [facility]

## 2025-06-28 NOTE — PLAN OF CARE
Problem: Adult Inpatient Plan of Care  Goal: Absence of Hospital-Acquired Illness or Injury  Intervention: Identify and Manage Fall Risk  Recent Flowsheet Documentation  Taken 6/28/2025 0200 by Rabia Correia RN  Safety Promotion/Fall Prevention: safety round/check completed  Taken 6/28/2025 0100 by Rabia Correia RN  Safety Promotion/Fall Prevention: safety round/check completed  Taken 6/28/2025 0000 by Rabia Correia RN  Safety Promotion/Fall Prevention: safety round/check completed  Taken 6/27/2025 2300 by Rabia Correia RN  Safety Promotion/Fall Prevention: safety round/check completed  Taken 6/27/2025 2200 by Rabia Correia RN  Safety Promotion/Fall Prevention: safety round/check completed  Taken 6/27/2025 2130 by Rabia Correia RN  Safety Promotion/Fall Prevention: safety round/check completed  Intervention: Prevent Skin Injury  Recent Flowsheet Documentation  Taken 6/27/2025 2130 by Rabia Correia RN  Body Position: position changed independently  Skin Protection: transparent dressing maintained  Intervention: Prevent and Manage VTE (Venous Thromboembolism) Risk  Recent Flowsheet Documentation  Taken 6/27/2025 2130 by Rabia Correia RN  VTE Prevention/Management: SCDs (sequential compression devices) off  Goal: Optimal Comfort and Wellbeing  Intervention: Provide Person-Centered Care  Recent Flowsheet Documentation  Taken 6/27/2025 2130 by Rabia Correia RN  Trust Relationship/Rapport:   care explained   choices provided   emotional support provided   empathic listening provided   questions answered   questions encouraged   reassurance provided   thoughts/feelings acknowledged  Goal: Readiness for Transition of Care  Intervention: Mutually Develop Transition Plan  Recent Flowsheet Documentation  Taken 6/27/2025 2130 by Rabia Correia RN  Transportation Anticipated: health plan transportation  Patient/Family Anticipated Services at Transition: none  Patient/Family Anticipates  Transition to: home with family  Taken 6/27/2025 2126 by Rabia Correia, RN  Equipment Currently Used at Home:   walker, standard   walker, rolling     Problem: Skin Injury Risk Increased  Goal: Skin Health and Integrity  Intervention: Optimize Skin Protection  Recent Flowsheet Documentation  Taken 6/27/2025 2130 by Rabia Correia, RN  Activity Management: bedrest  Pressure Reduction Techniques: frequent weight shift encouraged  Head of Bed (HOB) Positioning: HOB elevated  Pressure Reduction Devices: pressure-redistributing mattress utilized  Skin Protection: transparent dressing maintained   Goal Outcome Evaluation:

## 2025-07-01 NOTE — CASE MANAGEMENT/SOCIAL WORK
Case Management Discharge Note      Final Note: [facility] return                    Selected Continued Care - Prior Encounters Includes continued care and service providers with selected services from prior encounters from 3/29/2025 to 6/28/2025      Discharged on 6/24/2025 Admission date: 6/21/2025 - Discharge disposition: Skilled Nursing Facility (DC - External)      Destination       Service Provider Services Address Phone Fax Patient Preferred    [facility] [place] [facility] [address] [facility] [place] [address] [phone] --                          Transportation Services  Transportation: Private Transportation  Private: Car    Final Discharge Disposition Code: 62 - inpatient rehab facility

## 2025-07-14 ENCOUNTER — HOSPITAL ENCOUNTER (OUTPATIENT)
Facility: HOSPITAL | Age: 75
Setting detail: OBSERVATION
Discharge: NURSING, REHAB OR LONG-TERM CARE | End: 2025-07-17
Attending: STUDENT IN AN ORGANIZED HEALTH CARE EDUCATION/TRAINING PROGRAM | Admitting: STUDENT IN AN ORGANIZED HEALTH CARE EDUCATION/TRAINING PROGRAM
Payer: MEDICARE

## 2025-07-14 ENCOUNTER — APPOINTMENT (OUTPATIENT)
Dept: CT IMAGING | Facility: HOSPITAL | Age: 75
End: 2025-07-14
Payer: MEDICARE

## 2025-07-14 ENCOUNTER — APPOINTMENT (OUTPATIENT)
Dept: MRI IMAGING | Facility: HOSPITAL | Age: 75
End: 2025-07-14
Payer: MEDICARE

## 2025-07-14 DIAGNOSIS — N17.9 AKI (ACUTE KIDNEY INJURY): ICD-10-CM

## 2025-07-14 DIAGNOSIS — M54.50 ACUTE MIDLINE LOW BACK PAIN WITHOUT SCIATICA: ICD-10-CM

## 2025-07-14 DIAGNOSIS — S22.080A COMPRESSION FRACTURE OF T12 VERTEBRA, INITIAL ENCOUNTER: Primary | ICD-10-CM

## 2025-07-14 DIAGNOSIS — S22.080D COMPRESSION FRACTURE OF T12 VERTEBRA WITH ROUTINE HEALING, SUBSEQUENT ENCOUNTER: ICD-10-CM

## 2025-07-14 DIAGNOSIS — M54.6 ACUTE MIDLINE THORACIC BACK PAIN: ICD-10-CM

## 2025-07-14 PROBLEM — R53.1 GENERALIZED WEAKNESS: Status: ACTIVE | Noted: 2025-07-14

## 2025-07-14 LAB
ALBUMIN SERPL-MCNC: 4.1 G/DL (ref 3.5–5.2)
ALBUMIN/GLOB SERPL: 1.4 G/DL
ALP SERPL-CCNC: 120 U/L (ref 39–117)
ALT SERPL W P-5'-P-CCNC: 9 U/L (ref 1–33)
ANION GAP SERPL CALCULATED.3IONS-SCNC: 15.9 MMOL/L (ref 5–15)
ANION GAP SERPL CALCULATED.3IONS-SCNC: 21.2 MMOL/L (ref 5–15)
AST SERPL-CCNC: 16 U/L (ref 1–32)
BACTERIA UR QL AUTO: ABNORMAL /HPF
BASOPHILS # BLD AUTO: 0.06 10*3/MM3 (ref 0–0.2)
BASOPHILS # BLD AUTO: 0.06 10*3/MM3 (ref 0–0.2)
BASOPHILS NFR BLD AUTO: 0.5 % (ref 0–1.5)
BASOPHILS NFR BLD AUTO: 0.6 % (ref 0–1.5)
BILIRUB SERPL-MCNC: 0.9 MG/DL (ref 0–1.2)
BILIRUB UR QL STRIP: NEGATIVE
BUN SERPL-MCNC: 11.2 MG/DL (ref 8–23)
BUN SERPL-MCNC: 12 MG/DL (ref 8–23)
BUN/CREAT SERPL: 10.5 (ref 7–25)
BUN/CREAT SERPL: 12.6 (ref 7–25)
CALCIUM SPEC-SCNC: 10.5 MG/DL (ref 8.6–10.5)
CALCIUM SPEC-SCNC: 9.6 MG/DL (ref 8.6–10.5)
CHLORIDE SERPL-SCNC: 100 MMOL/L (ref 98–107)
CHLORIDE SERPL-SCNC: 102 MMOL/L (ref 98–107)
CLARITY UR: ABNORMAL
CO2 SERPL-SCNC: 20.8 MMOL/L (ref 22–29)
CO2 SERPL-SCNC: 22.1 MMOL/L (ref 22–29)
COLOR UR: YELLOW
CREAT SERPL-MCNC: 0.95 MG/DL (ref 0.57–1)
CREAT SERPL-MCNC: 1.07 MG/DL (ref 0.57–1)
DEPRECATED RDW RBC AUTO: 47.4 FL (ref 37–54)
DEPRECATED RDW RBC AUTO: 48.1 FL (ref 37–54)
EGFRCR SERPLBLD CKD-EPI 2021: 54.6 ML/MIN/1.73
EGFRCR SERPLBLD CKD-EPI 2021: 63 ML/MIN/1.73
EOSINOPHIL # BLD AUTO: 0.28 10*3/MM3 (ref 0–0.4)
EOSINOPHIL # BLD AUTO: 0.32 10*3/MM3 (ref 0–0.4)
EOSINOPHIL NFR BLD AUTO: 2.2 % (ref 0.3–6.2)
EOSINOPHIL NFR BLD AUTO: 3.1 % (ref 0.3–6.2)
ERYTHROCYTE [DISTWIDTH] IN BLOOD BY AUTOMATED COUNT: 13.2 % (ref 12.3–15.4)
ERYTHROCYTE [DISTWIDTH] IN BLOOD BY AUTOMATED COUNT: 13.4 % (ref 12.3–15.4)
ERYTHROCYTE [SEDIMENTATION RATE] IN BLOOD: 16 MM/HR (ref 0–30)
GLOBULIN UR ELPH-MCNC: 2.9 GM/DL
GLUCOSE SERPL-MCNC: 101 MG/DL (ref 65–99)
GLUCOSE SERPL-MCNC: 168 MG/DL (ref 65–99)
GLUCOSE UR STRIP-MCNC: NEGATIVE MG/DL
HCT VFR BLD AUTO: 43.9 % (ref 34–46.6)
HCT VFR BLD AUTO: 46.1 % (ref 34–46.6)
HGB BLD-MCNC: 14.2 G/DL (ref 12–15.9)
HGB BLD-MCNC: 14.8 G/DL (ref 12–15.9)
HGB UR QL STRIP.AUTO: ABNORMAL
HYALINE CASTS UR QL AUTO: ABNORMAL /LPF
IMM GRANULOCYTES # BLD AUTO: 0.05 10*3/MM3 (ref 0–0.05)
IMM GRANULOCYTES # BLD AUTO: 0.05 10*3/MM3 (ref 0–0.05)
IMM GRANULOCYTES NFR BLD AUTO: 0.4 % (ref 0–0.5)
IMM GRANULOCYTES NFR BLD AUTO: 0.5 % (ref 0–0.5)
KETONES UR QL STRIP: ABNORMAL
LEUKOCYTE ESTERASE UR QL STRIP.AUTO: ABNORMAL
LIPASE SERPL-CCNC: 19 U/L (ref 13–60)
LYMPHOCYTES # BLD AUTO: 1.52 10*3/MM3 (ref 0.7–3.1)
LYMPHOCYTES # BLD AUTO: 1.66 10*3/MM3 (ref 0.7–3.1)
LYMPHOCYTES NFR BLD AUTO: 13 % (ref 19.6–45.3)
LYMPHOCYTES NFR BLD AUTO: 14.6 % (ref 19.6–45.3)
MCH RBC QN AUTO: 30.9 PG (ref 26.6–33)
MCH RBC QN AUTO: 31.1 PG (ref 26.6–33)
MCHC RBC AUTO-ENTMCNC: 32.1 G/DL (ref 31.5–35.7)
MCHC RBC AUTO-ENTMCNC: 32.3 G/DL (ref 31.5–35.7)
MCV RBC AUTO: 96.2 FL (ref 79–97)
MCV RBC AUTO: 96.3 FL (ref 79–97)
MONOCYTES # BLD AUTO: 0.77 10*3/MM3 (ref 0.1–0.9)
MONOCYTES # BLD AUTO: 0.79 10*3/MM3 (ref 0.1–0.9)
MONOCYTES NFR BLD AUTO: 6 % (ref 5–12)
MONOCYTES NFR BLD AUTO: 7.6 % (ref 5–12)
NEUTROPHILS NFR BLD AUTO: 7.65 10*3/MM3 (ref 1.7–7)
NEUTROPHILS NFR BLD AUTO: 73.6 % (ref 42.7–76)
NEUTROPHILS NFR BLD AUTO: 77.9 % (ref 42.7–76)
NEUTROPHILS NFR BLD AUTO: 9.99 10*3/MM3 (ref 1.7–7)
NITRITE UR QL STRIP: NEGATIVE
NRBC BLD AUTO-RTO: 0 /100 WBC (ref 0–0.2)
NRBC BLD AUTO-RTO: 0 /100 WBC (ref 0–0.2)
PH UR STRIP.AUTO: 5.5 [PH] (ref 5–8)
PLATELET # BLD AUTO: 252 10*3/MM3 (ref 140–450)
PLATELET # BLD AUTO: 271 10*3/MM3 (ref 140–450)
PMV BLD AUTO: 11.3 FL (ref 6–12)
PMV BLD AUTO: 11.4 FL (ref 6–12)
POTASSIUM SERPL-SCNC: 3.8 MMOL/L (ref 3.5–5.2)
POTASSIUM SERPL-SCNC: 5.4 MMOL/L (ref 3.5–5.2)
PROT SERPL-MCNC: 7 G/DL (ref 6–8.5)
PROT UR QL STRIP: ABNORMAL
RBC # BLD AUTO: 4.56 10*6/MM3 (ref 3.77–5.28)
RBC # BLD AUTO: 4.79 10*6/MM3 (ref 3.77–5.28)
RBC # UR STRIP: ABNORMAL /HPF
REF LAB TEST METHOD: ABNORMAL
SODIUM SERPL-SCNC: 140 MMOL/L (ref 136–145)
SODIUM SERPL-SCNC: 142 MMOL/L (ref 136–145)
SP GR UR STRIP: 1.02 (ref 1–1.03)
SQUAMOUS #/AREA URNS HPF: ABNORMAL /HPF
TRANS CELLS #/AREA URNS HPF: ABNORMAL /HPF
UROBILINOGEN UR QL STRIP: ABNORMAL
WBC # UR STRIP: ABNORMAL /HPF
WBC NRBC COR # BLD AUTO: 10.39 10*3/MM3 (ref 3.4–10.8)
WBC NRBC COR # BLD AUTO: 12.81 10*3/MM3 (ref 3.4–10.8)

## 2025-07-14 PROCEDURE — 25010000002 HYDROMORPHONE PER 4 MG

## 2025-07-14 PROCEDURE — 87086 URINE CULTURE/COLONY COUNT: CPT

## 2025-07-14 PROCEDURE — 72148 MRI LUMBAR SPINE W/O DYE: CPT

## 2025-07-14 PROCEDURE — 81001 URINALYSIS AUTO W/SCOPE: CPT

## 2025-07-14 PROCEDURE — 80053 COMPREHEN METABOLIC PANEL: CPT

## 2025-07-14 PROCEDURE — 85025 COMPLETE CBC W/AUTO DIFF WBC: CPT

## 2025-07-14 PROCEDURE — 25810000003 SODIUM CHLORIDE 0.9 % SOLUTION

## 2025-07-14 PROCEDURE — G0378 HOSPITAL OBSERVATION PER HR: HCPCS

## 2025-07-14 PROCEDURE — 96365 THER/PROPH/DIAG IV INF INIT: CPT

## 2025-07-14 PROCEDURE — 87077 CULTURE AEROBIC IDENTIFY: CPT

## 2025-07-14 PROCEDURE — 85652 RBC SED RATE AUTOMATED: CPT

## 2025-07-14 PROCEDURE — 96375 TX/PRO/DX INJ NEW DRUG ADDON: CPT

## 2025-07-14 PROCEDURE — 25010000002 ONDANSETRON PER 1 MG

## 2025-07-14 PROCEDURE — 96361 HYDRATE IV INFUSION ADD-ON: CPT

## 2025-07-14 PROCEDURE — 83690 ASSAY OF LIPASE: CPT

## 2025-07-14 PROCEDURE — 99285 EMERGENCY DEPT VISIT HI MDM: CPT

## 2025-07-14 PROCEDURE — 96376 TX/PRO/DX INJ SAME DRUG ADON: CPT

## 2025-07-14 PROCEDURE — 25010000002 CEFTRIAXONE PER 250 MG

## 2025-07-14 PROCEDURE — 87186 SC STD MICRODIL/AGAR DIL: CPT

## 2025-07-14 RX ORDER — ONDANSETRON 4 MG/1
4 TABLET, ORALLY DISINTEGRATING ORAL EVERY 6 HOURS PRN
Status: DISCONTINUED | OUTPATIENT
Start: 2025-07-14 | End: 2025-07-17 | Stop reason: HOSPADM

## 2025-07-14 RX ORDER — ACETAMINOPHEN 325 MG/1
650 TABLET ORAL EVERY 4 HOURS PRN
Status: DISCONTINUED | OUTPATIENT
Start: 2025-07-14 | End: 2025-07-17 | Stop reason: HOSPADM

## 2025-07-14 RX ORDER — BISACODYL 10 MG
10 SUPPOSITORY, RECTAL RECTAL DAILY PRN
Status: DISCONTINUED | OUTPATIENT
Start: 2025-07-14 | End: 2025-07-17 | Stop reason: HOSPADM

## 2025-07-14 RX ORDER — SODIUM CHLORIDE 0.9 % (FLUSH) 0.9 %
10 SYRINGE (ML) INJECTION AS NEEDED
Status: DISCONTINUED | OUTPATIENT
Start: 2025-07-14 | End: 2025-07-17 | Stop reason: HOSPADM

## 2025-07-14 RX ORDER — ONDANSETRON 2 MG/ML
4 INJECTION INTRAMUSCULAR; INTRAVENOUS ONCE
Status: COMPLETED | OUTPATIENT
Start: 2025-07-14 | End: 2025-07-14

## 2025-07-14 RX ORDER — ONDANSETRON 2 MG/ML
4 INJECTION INTRAMUSCULAR; INTRAVENOUS EVERY 6 HOURS PRN
Status: DISCONTINUED | OUTPATIENT
Start: 2025-07-14 | End: 2025-07-17 | Stop reason: HOSPADM

## 2025-07-14 RX ORDER — ACETAMINOPHEN 160 MG/5ML
650 SOLUTION ORAL EVERY 4 HOURS PRN
Status: DISCONTINUED | OUTPATIENT
Start: 2025-07-14 | End: 2025-07-17 | Stop reason: HOSPADM

## 2025-07-14 RX ORDER — HYDROMORPHONE HYDROCHLORIDE 1 MG/ML
0.5 INJECTION, SOLUTION INTRAMUSCULAR; INTRAVENOUS; SUBCUTANEOUS ONCE
Refills: 0 | Status: COMPLETED | OUTPATIENT
Start: 2025-07-14 | End: 2025-07-14

## 2025-07-14 RX ORDER — AMOXICILLIN 250 MG
2 CAPSULE ORAL 2 TIMES DAILY PRN
Status: DISCONTINUED | OUTPATIENT
Start: 2025-07-14 | End: 2025-07-17 | Stop reason: HOSPADM

## 2025-07-14 RX ORDER — POLYETHYLENE GLYCOL 3350 17 G/17G
17 POWDER, FOR SOLUTION ORAL DAILY PRN
Status: DISCONTINUED | OUTPATIENT
Start: 2025-07-14 | End: 2025-07-17 | Stop reason: HOSPADM

## 2025-07-14 RX ORDER — HYDROCODONE BITARTRATE AND ACETAMINOPHEN 5; 325 MG/1; MG/1
1 TABLET ORAL EVERY 6 HOURS PRN
Status: ON HOLD | COMMUNITY
End: 2025-07-17

## 2025-07-14 RX ORDER — BISACODYL 5 MG/1
5 TABLET, DELAYED RELEASE ORAL DAILY PRN
Status: DISCONTINUED | OUTPATIENT
Start: 2025-07-14 | End: 2025-07-17 | Stop reason: HOSPADM

## 2025-07-14 RX ORDER — SODIUM CHLORIDE 9 MG/ML
100 INJECTION, SOLUTION INTRAVENOUS CONTINUOUS
Status: DISPENSED | OUTPATIENT
Start: 2025-07-14 | End: 2025-07-15

## 2025-07-14 RX ORDER — ACETAMINOPHEN 650 MG/1
650 SUPPOSITORY RECTAL EVERY 4 HOURS PRN
Status: DISCONTINUED | OUTPATIENT
Start: 2025-07-14 | End: 2025-07-17 | Stop reason: HOSPADM

## 2025-07-14 RX ADMIN — HYDROMORPHONE HYDROCHLORIDE 0.5 MG: 1 INJECTION, SOLUTION INTRAMUSCULAR; INTRAVENOUS; SUBCUTANEOUS at 16:53

## 2025-07-14 RX ADMIN — ONDANSETRON 4 MG: 2 INJECTION, SOLUTION INTRAMUSCULAR; INTRAVENOUS at 16:53

## 2025-07-14 RX ADMIN — ONDANSETRON 4 MG: 2 INJECTION, SOLUTION INTRAMUSCULAR; INTRAVENOUS at 10:27

## 2025-07-14 RX ADMIN — SODIUM CHLORIDE 100 ML/HR: 9 INJECTION, SOLUTION INTRAVENOUS at 20:57

## 2025-07-14 RX ADMIN — HYDROMORPHONE HYDROCHLORIDE 0.5 MG: 1 INJECTION, SOLUTION INTRAMUSCULAR; INTRAVENOUS; SUBCUTANEOUS at 10:28

## 2025-07-14 RX ADMIN — ONDANSETRON 4 MG: 2 INJECTION, SOLUTION INTRAMUSCULAR; INTRAVENOUS at 18:16

## 2025-07-14 RX ADMIN — CEFTRIAXONE SODIUM 1000 MG: 1 INJECTION, POWDER, FOR SOLUTION INTRAMUSCULAR; INTRAVENOUS at 12:11

## 2025-07-14 NOTE — Clinical Note
Level of Care: Med/Surg [1]   Diagnosis: Generalized weakness [056563]   Admitting Physician: LILLIAN OROURKE [[id number]]   Attending Physician: LILLIAN OROURKE [[id number]]

## 2025-07-14 NOTE — H&P
[facility] Medicine Services  History & Physical    Patient Name: Yaneli Hernandez  : 1950  MRN: [id number]  Primary Care Physician:  Mina Padilla PA  Date of admission: 2025  Date and Time of Service: 2025 at 1758    Subjective      Chief Complaint: Back/flank pain, weakness    History of Present Illness: Yaneli Hernandez is a 75 y.o. female with a CMH of HTN, HLD, anxiety, depression who presented to [facility] with back pain, generalized weakness.  Patient reports previously stating that he fell on 2025 and that she was evaluated at this facility.  CT head, cervical spine, pelvis with no noted acute findings.  Patient was noted to have UTI at that time and was started on Rocephin.  However urine cultures demonstrated no growth and Rocephin was discontinued.  She was then found to have sphenoid sinusitis and was discharged on Augmentin.  Patient was discharged to [facility] on 2025 after PT/OT evaluation recommended acute IP rehab.  Patient returns this evening due to increase of back/flank pain.  Patient reports feeling fine upon discharge from [facility].  However she reports over the last few days she has had worsening of back pain and difficulty ambulating.  Denies recent falls.  Patient currently resides with sister.  Patient also endorses abdominal pain, specifically after eating a meal and as a result has had decreased appetite, as well as nausea, vomiting, chills.  Patient currently denies dysuria, chest pain, shortness of breath, fever.    On ED evaluation, patient was initially noted to be tachycardic with heart rate in 120s but otherwise HDS, AF.  BMP was pertinent for mild hyperkalemia with potassium of 5.4, otherwise unremarkable.  CBC with mild leukocytosis with WBC 12.8.  UA with 21-50 WBCs, trace bacteria, moderate leukocytes.  MRI of the L-spine revealed subacute compression fracture with increased height loss of 50% and mild spinal canal  stenosis.  Patient was given Rocephin in the ED.  Hospital service to admit for further evaluation and management.     Review of Systems   Constitutional:  Positive for chills. Negative for fever.   Respiratory:  Negative for shortness of breath.    Cardiovascular:  Negative for chest pain.   Gastrointestinal:  Positive for abdominal pain, nausea and vomiting.   Genitourinary:  Negative for dysuria, frequency and urgency.   All other systems reviewed and are negative.      Personal History     Past Medical History:   Diagnosis Date    Anxiety     Depression     Elevated cholesterol     Headache        Past Surgical History:   Procedure Laterality Date    SHOULDER ARTHROSCOPY Left     ROTATOR CUFF REPAIR    SHOULDER ARTHROSCOPY W/ ROTATOR CUFF REPAIR Right 8/24/2016    Procedure: RT SHOULDER MANIPULATION, RT SHOULDER ARTHROSCOPY WITH ROTATOR CUFF REPAIR, BANKART REPAIR  AND ACROMIOPLASTY ;  Surgeon: Francesco Wall MD;  [place] [facility]  Service:        Family History: family history includes Heart failure in her mother. Otherwise pertinent FHx was reviewed and not pertinent to current issue.    Social History:  reports that she has never smoked. She has never used smokeless tobacco. She reports that she does not drink alcohol and does not use drugs.    Home Medications:  Prior to Admission Medications       Prescriptions Last Dose Informant Patient Reported? Taking?    acetaminophen (TYLENOL) 325 MG tablet   Yes No    Take 2 tablets by mouth Every 4 (Four) Hours As Needed for Mild Pain.    ALPRAZolam (XANAX) 1 MG tablet   Yes No    Take 2 tablets by mouth Every 6 (Six) Hours As Needed for Anxiety.    amLODIPine (NORVASC) 5 MG tablet   No No    Take 2 tablets by mouth Daily for 30 days.    atorvastatin (LIPITOR) 80 MG tablet   Yes No    Take 1 tablet by mouth Daily.    bisacodyl (Bisacodyl Laxative) 10 MG suppository   Yes No    Insert 1 suppository into the rectum Daily As Needed for Constipation.     docusate sodium (COLACE) 100 MG capsule   Yes No    Take 1 capsule by mouth 2 (Two) Times a Day.    escitalopram (LEXAPRO) 10 MG tablet   Yes No    Take 2 tablets by mouth Daily.    hydrALAZINE (APRESOLINE) 25 MG tablet   Yes No    Take 1 tablet by mouth 3 (Three) Times a Day.    naloxone (NARCAN) 0.4 MG/ML injection   Yes No    Infuse 1 mL into a venous catheter Daily As Needed for Opioid Reversal.    ondansetron ODT (ZOFRAN-ODT) 4 MG disintegrating tablet   Yes No    Place 1 tablet on the tongue Every 6 (Six) Hours As Needed for Nausea or Vomiting.    oxybutynin (DITROPAN) 5 MG tablet   Yes No    Take 1 tablet by mouth 3 (Three) Times a Day.    polyethylene glycol (MiraLax) 17 GM/SCOOP powder   Yes No    See Admin Instructions. Give 17 G po bid in 8 ounces of water twice daily. Until bowel movement occurs then change to once daily.    sodium phosphate (FLEET) 7-19 GM/197ML enema ADULT enema   Yes No    Insert 1 enema into the rectum 1 (One) Time As Needed (constipation).    sorbitol 70 % solution solution   Yes No    Take 30 mL by mouth 2 (Two) Times a Day As Needed.              Allergies:  Allergies   Allergen Reactions    Topamax [Topiramate] Hives and Other (See Comments)     CAUSED KIDNEY STONES    Codeine Hives and Itching       Objective      Vitals:   Temp:  [98.1 °F (36.7 °C)] 98.1 °F (36.7 °C)  Heart Rate:  [] 77  Resp:  [24] 24  BP: (109-135)/(57-97) 125/64  Body mass index is 23.45 kg/m².    Physical Exam  General: 74 yo WM, Alert and oriented, well nourished, no acute distress.  HENT: Normocephalic, normal hearing, moist oral mucosa, no scleral icterus.  Neck: Supple, nontender, no carotid bruits, no JVD, no LAD.  Lungs: Clear to auscultation, nonlabored respiration.  Heart: RRR, no murmur, gallop or edema.  Abdomen: Soft, nontender, nondistended, + bowel sounds.  Musculoskeletal: Normal range of motion and strength, no tenderness or swelling.  Skin: Skin is warm, dry and pink, no rashes or  lesions.  Psychiatric: Cooperative, appropriate mood and affect.      Diagnostic Data:  Lab Results (last 24 hours)       Procedure Component Value Units Date/Time    Lipase [[id number]] Collected: 07/14/25 1025    Specimen: Blood Updated: 07/14/25 1907    Urinalysis, Microscopic Only - Urine, Clean Catch [[id number]]  (Abnormal) Collected: 07/14/25 1118    Specimen: Urine, Clean Catch Updated: 07/14/25 1142     RBC, UA 3-5 /HPF      WBC, UA 21-50 /HPF      Bacteria, UA Trace /HPF      Squamous Epithelial Cells, UA 0-2 /HPF      Transitional Epithelial Cells, UA 0-2 /HPF      Hyaline Casts, UA None Seen /LPF      Methodology Manual Light Microscopy    Urine Culture - Urine, Urine, Clean Catch [[id number]] Collected: 07/14/25 1118    Specimen: Urine, Clean Catch Updated: 07/14/25 1142    Urinalysis With Culture If Indicated - Urine, Clean Catch [[id number]]  (Abnormal) Collected: 07/14/25 1118    Specimen: Urine, Clean Catch Updated: 07/14/25 1131     Color, UA Yellow     Appearance, UA Cloudy     pH, UA 5.5     Specific Gravity, UA 1.023     Glucose, UA Negative     Ketones, UA >=160 mg/dL (4+)     Bilirubin, UA Negative     Blood, UA Small (1+)     Protein, UA 30 mg/dL (1+)     Leuk Esterase, UA Moderate (2+)     Nitrite, UA Negative     Urobilinogen, UA 1.0 E.U./dL    Narrative:      In absence of clinical symptoms, the presence of pyuria, bacteria, and/or nitrites on the urinalysis result does not correlate with infection.    Comprehensive Metabolic Panel [[id number]]  (Abnormal) Collected: 07/14/25 1025    Specimen: Blood Updated: 07/14/25 1109     Glucose 101 mg/dL      BUN 11.2 mg/dL      Creatinine 1.07 mg/dL      Sodium 142 mmol/L      Potassium 5.4 mmol/L      Chloride 100 mmol/L      CO2 20.8 mmol/L      Calcium 10.5 mg/dL      Total Protein 7.0 g/dL      Albumin 4.1 g/dL      ALT (SGPT) 9 U/L      AST (SGOT) 16 U/L      Alkaline Phosphatase 120 U/L      Total Bilirubin 0.9 mg/dL      Globulin 2.9 gm/dL       A/G Ratio 1.4 g/dL      BUN/Creatinine Ratio 10.5     Anion Gap 21.2 mmol/L      eGFR 54.6 mL/min/1.73     Narrative:      GFR Categories in Chronic Kidney Disease (CKD)              GFR Category          GFR (mL/min/1.73)    Interpretation  G1                    90 or greater        Normal or high (1)  G2                    60-89                Mild decrease (1)  G3a                   45-59                Mild to moderate decrease  G3b                   30-44                Moderate to severe decrease  G4                    15-29                Severe decrease  G5                    14 or less           Kidney failure    (1)In the absence of evidence of kidney disease, neither GFR category G1 or G2 fulfill the criteria for CKD.    eGFR calculation 2021 CKD-EPI creatinine equation, which does not include race as a factor    Sedimentation Rate [[id number]]  (Normal) Collected: 07/14/25 1025    Specimen: Blood Updated: 07/14/25 1045     Sed Rate 16 mm/hr     CBC & Differential [[id number]]  (Abnormal) Collected: 07/14/25 1025    Specimen: Blood Updated: 07/14/25 1039    Narrative:      The following orders were created for panel order CBC & Differential.  Procedure                               Abnormality         Status                     ---------                               -----------         ------                     CBC Auto Differential[[id number]]        Abnormal            Final result                 Please view results for these tests on the individual orders.    CBC Auto Differential [[id number]]  (Abnormal) Collected: 07/14/25 1025    Specimen: Blood Updated: 07/14/25 1039     WBC 12.81 10*3/mm3      RBC 4.79 10*6/mm3      Hemoglobin 14.8 g/dL      Hematocrit 46.1 %      MCV 96.2 fL      MCH 30.9 pg      MCHC 32.1 g/dL      RDW 13.2 %      RDW-SD 47.4 fl      MPV 11.4 fL      Platelets 271 10*3/mm3      Neutrophil % 77.9 %      Lymphocyte % 13.0 %      Monocyte % 6.0 %      Eosinophil % 2.2 %       Basophil % 0.5 %      Immature Grans % 0.4 %      Neutrophils, Absolute 9.99 10*3/mm3      Lymphocytes, Absolute 1.66 10*3/mm3      Monocytes, Absolute 0.77 10*3/mm3      Eosinophils, Absolute 0.28 10*3/mm3      Basophils, Absolute 0.06 10*3/mm3      Immature Grans, Absolute 0.05 10*3/mm3      nRBC 0.0 /100 WBC              Imaging Results (Last 24 Hours)       Procedure Component Value Units Date/Time    MRI Lumbar Spine Without Contrast [[id number]] Collected: 07/14/25 1636     Updated: 07/14/25 1704    Narrative:      MRI LUMBAR SPINE WO CONTRAST    Date of Exam: 7/14/2025 4:10 PM EDT    Indication: Fall 3 wks prior with ongoing pain.     Comparison: CT abdomen pelvis 6/14/2016.. CT pelvis 6/21/2025. Chest CT 6/27/2025.    Technique:  Routine multiplanar/multisequence sequence images of the lumbar spine were obtained without contrast administration.        Findings:  Vertebral numbering: The last well formed disc is labeled L5-S1.    Alignment: Mild dextroconvex curvature of the lumbar spine.    Vertebrae: Subacute compression fracture with bone marrow edema at T12 vertebral body with burst component and a 3 mm osseous retropulsion. There has been interval mildly increased height loss measuring approximately 50%, previously up to 40%.. .    Multilevel degenerative endplate changes including Modic type I/fibrovascular endplate degenerative changes at L1-2..     Discs and spinal canal: Multilevel disc desiccation and disc height loss. Degenerative changes detailed below by level. Sacral Tarlov/perineural cyst.    Spinal cord and cauda equina: The visible spinal cord has normal signal and morphology. No cauda equina compression. The conus tip lies at L1 level..    Adjacent soft tissues: No significant abnormality.    Findings by level:    T11-12: Mild diffuse disc bulge. No significant facet arthropathy. No significant spinal canal or foraminal stenosis.    T12-L1: Mild diffuse disc bulge. Osseous retropulsion at T12  vertebral body level. No significant facet arthropathy. Up to mild spinal canal stenosis at T12 vertebral body level. No significant foraminal narrowing.    L1-2: Diffuse disc bulge with osteophytic ridging. Mild bilateral facet arthropathy.. No significant spinal canal stenosis. Mild left greater than right foraminal narrowing.    L2-3: Diffuse disc bulge with osteophytic ridging. Mild bilateral facet arthropathy. No significant spinal canal or foraminal stenosis.    L3-4: Diffuse disc bulge with osteophytic ridging. Moderate bilateral facet arthropathy with ligamentum flavum thickening.. Mild spinal canal stenosis. Mild bilateral foraminal narrowing.    L4-5: Diffuse disc bulge with osteophytic ridging. Moderate bilateral facet arthropathy with ligamentum flavum thickening. No significant spinal canal stenosis. Mild bilateral foraminal narrowing.    L5-S1: Diffuse disc bulge with osteophytic ridging. Mild bilateral facet arthropathy. No significant spinal canal or foraminal stenosis.          Impression:      Impression:  Subacute compression fracture at T12 with interval mildly increased height loss measuring approximately 50%, previously up to 40%. Mild osseous retropulsion at T12 vertebral body level with up to mild spinal canal stenosis.    Multilevel degenerative changes of the lumbar spine without high-grade spinal canal or foraminal stenosis.        Electronically Signed: Dimitri Adler MD    7/14/2025 5:02 PM EDT    Workstation ID: [id number]              Assessment & Plan        This is a 75 y.o. female with:    Active and Resolved Problems  Active Hospital Problems    Diagnosis  POA    **Generalized weakness [R53.1]  Yes      Resolved Hospital Problems   No resolved problems to display.       Urinary tract infection  Hyperkalemia, mild  Generalized weakness  Subacute T12 compression fracture s/p fall 6/21/2025  MRI L-spine: Subacute T12 compression fracture with interval height loss of 50% and mild  spinal canal stenosis  K 5.4, should resolve with IV fluids  UA with 21-50 WBCs, trace bacteria, moderate leukocytes  Urine cultures pending  Continue Rocephin  PT/OT to evaluate, will likely need placement  Neurosurgery consulted    Postprandial pain and nausea  Anorexia  CT A/P pending  Liver enzymes WNL-AST 16, ALT 9, T. bili 0.9  Lipase pending  As needed antiemetic    Hypertension  Hyperlipidemia  BP controlled  Continue amlodipine, hydralazine  Continue statin    OAB-continue oxybutynin  Anxiety/depression-continue Xanax and Lexapro            VTE Prophylaxis:  Mechanical VTE prophylaxis orders are present.        The patient desires to be as follows:    CODE STATUS:    Code Status (Patient has no pulse and is not breathing): CPR (Attempt to Resuscitate)  Medical Interventions (Patient has pulse or is breathing): Full Support        Bright Trejo, who can be contacted at [phone], is the designated person to make medical decisions on the patient's behalf if She is incapable of doing so. This was clarified with patient and/or next of kin on 7/14/2025 during the course of this H&P.    Admission Status:  I believe this patient meets observation status.    Expected Length of Stay: 1 to 2 days    PDMP and Medication Dispenses via Sidebar reviewed and consistent with patient reported medications.    I discussed the patient's findings and my recommendations with patient.      Signature:     This document has been electronically signed by Mono Mcclure PA-C on July 14, 2025 19:07 EDT   [facility]

## 2025-07-14 NOTE — ED PROVIDER NOTES
Antonio Valle  Female, 63 year old, 1957  Gender Pronouns:   Not Documented  Phone:   [phone] (M)  Last Weight:   116.1 kg  PCP:   Jossie Latif MD  Allergies:   Penicillins,      Sulfa Antibiotics,      Doxycycline Monohydrate,      Tape [Adhesive (Environmental)]  Primary Ins:   [employer]  MRN:   [id number]  Patient Portal:   Pending  Next Appt:   With Orthopedics (Corey Carrington MD)  2020 at 1:10 PM  Last Appt With Me:   Hx:     RE: rehab   Received: Yesterday   Message Contents   Junior Cole RN  Isabel Mc             Sounds good, I called her.  Thanks    Previous Messages      ----- Message -----   From: Isabel Mc   Sent: 6/10/2020   3:26 PM CDT   To: WENDI Carrington Nurse Msg Pool   Subject: rehab                                             Marco Alvarez is wondering about rehab and going back to work.     Can you call her home number regarding this?     Thanks!     Isabel            Subjective     Chief Complaint   Patient presents with    Back Pain     History of Present Illness  Chief complaint: Back pain    Context: Patient is 75-year-old female with a history of headache, anxiety and depression, who returns to the emergency department with complaints of back pain.  Patient has been seen and admitted on 6/21/2025 and 6/27/2025 for complaints including back pain, hypertension, and chest pain.  Patient's initial injury was a fall on 6/17 or 6/18/2025.  Patient states current exacerbation of pain began early this morning and states pain is in the middle of her lower back down to the top of her gluteal cleft.  Patient states some cough with subjective fever at home in previous days.  Patient denies any chest pain or shortness of breath at this time.    PCP: Mina Padilla PA    LNMP: Postmenopausal               Review of Systems   Musculoskeletal:  Positive for back pain.       Past Medical History:   Diagnosis Date    Anxiety     Depression     Elevated cholesterol     Headache        Allergies   Allergen Reactions    Topamax [Topiramate] Hives and Other (See Comments)     CAUSED KIDNEY STONES    Codeine Hives and Itching       Past Surgical History:   Procedure Laterality Date    SHOULDER ARTHROSCOPY Left     ROTATOR CUFF REPAIR    SHOULDER ARTHROSCOPY W/ ROTATOR CUFF REPAIR Right 8/24/2016    Procedure: RT SHOULDER MANIPULATION, RT SHOULDER ARTHROSCOPY WITH ROTATOR CUFF REPAIR, BANKART REPAIR  AND ACROMIOPLASTY ;  Surgeon: Francesco Wall MD;  Location: [facility] OR [facility] [place];  Service:        Family History   Problem Relation Age of Onset    Heart failure Mother        Social History     Socioeconomic History    Marital status:    Tobacco Use    Smoking status: Never    Smokeless tobacco: Never   Vaping Use    Vaping status: Never Used   Substance and Sexual Activity    Alcohol use: No    Drug use: No    Sexual activity: Not Currently           Objective   Physical Exam  Vitals and  nursing note reviewed.   Constitutional:       General: She is in acute distress.      Appearance: She is ill-appearing. She is not toxic-appearing.   HENT:      Head: Normocephalic and atraumatic.      Nose: No congestion or rhinorrhea.      Mouth/Throat:      Mouth: Mucous membranes are moist.      Pharynx: Oropharynx is clear.   Eyes:      Extraocular Movements: Extraocular movements intact.      Pupils: Pupils are equal, round, and reactive to light.   Cardiovascular:      Rate and Rhythm: Normal rate and regular rhythm.      Pulses: Normal pulses.      Heart sounds: Normal heart sounds.   Pulmonary:      Effort: Pulmonary effort is normal. No respiratory distress.      Breath sounds: Normal breath sounds. No stridor. No wheezing, rhonchi or rales.   Abdominal:      General: Abdomen is flat. There is no distension.      Palpations: Abdomen is soft. There is no mass.      Tenderness: There is no abdominal tenderness.   Musculoskeletal:      Cervical back: Normal range of motion and neck supple. No rigidity or tenderness.      Lumbar back: Tenderness present.   Skin:     General: Skin is warm and dry.      Capillary Refill: Capillary refill takes less than 2 seconds.      Coloration: Skin is not jaundiced or pale.   Neurological:      General: No focal deficit present.      Mental Status: She is alert and oriented to person, place, and time. Mental status is at baseline.      Motor: Weakness present.      Coordination: Coordination abnormal.   Psychiatric:         Mood and Affect: Mood normal.         Behavior: Behavior normal.         Thought Content: Thought content normal.         Judgment: Judgment normal.         Procedures           ED Course  ED Course as of 07/14/25 1805   Mon Jul 14, 2025   1148 UA and microscopy reviewed; ceftriaxone IV ordered for patient. [RS]   1356 Waiting for patient to go to MRI. [RS]   1521 Waiting for patient to go to MRI. [RS]   1752 Hospitalist consulted for admission.   "Consults for neurosurgery and physical therapy placed. [RS]      ED Course User Index  [RS] Lamonte Bowman, PRERNA      /64   Pulse 75   Temp 98.1 °F (36.7 °C)   Resp 24   Ht 162.6 cm (64.02\")   Wt 62 kg (136 lb 11 oz)   SpO2 92%   BMI 23.45 kg/m²   Labs Reviewed   COMPREHENSIVE METABOLIC PANEL - Abnormal; Notable for the following components:       Result Value    Glucose 101 (*)     Creatinine 1.07 (*)     Potassium 5.4 (*)     CO2 20.8 (*)     Alkaline Phosphatase 120 (*)     Anion Gap 21.2 (*)     eGFR 54.6 (*)     All other components within normal limits    Narrative:     GFR Categories in Chronic Kidney Disease (CKD)              GFR Category          GFR (mL/min/1.73)    Interpretation  G1                    90 or greater        Normal or high (1)  G2                    60-89                Mild decrease (1)  G3a                   45-59                Mild to moderate decrease  G3b                   30-44                Moderate to severe decrease  G4                    15-29                Severe decrease  G5                    14 or less           Kidney failure    (1)In the absence of evidence of kidney disease, neither GFR category G1 or G2 fulfill the criteria for CKD.    eGFR calculation 2021 CKD-EPI creatinine equation, which does not include race as a factor   URINALYSIS W/ CULTURE IF INDICATED - Abnormal; Notable for the following components:    Appearance, UA Cloudy (*)     Ketones, UA >=160 mg/dL (4+) (*)     Blood, UA Small (1+) (*)     Protein, UA 30 mg/dL (1+) (*)     Leuk Esterase, UA Moderate (2+) (*)     All other components within normal limits    Narrative:     In absence of clinical symptoms, the presence of pyuria, bacteria, and/or nitrites on the urinalysis result does not correlate with infection.   CBC WITH AUTO DIFFERENTIAL - Abnormal; Notable for the following components:    WBC 12.81 (*)     Neutrophil % 77.9 (*)     Lymphocyte % 13.0 (*)     Neutrophils, Absolute " 9.99 (*)     All other components within normal limits   URINALYSIS, MICROSCOPIC ONLY - Abnormal; Notable for the following components:    RBC, UA 3-5 (*)     WBC, UA 21-50 (*)     Bacteria, UA Trace (*)     All other components within normal limits   SEDIMENTATION RATE - Normal   URINE CULTURE   CBC AND DIFFERENTIAL    Narrative:     The following orders were created for panel order CBC & Differential.  Procedure                               Abnormality         Status                     ---------                               -----------         ------                     CBC Auto Differential[[id number]]        Abnormal            Final result                 Please view results for these tests on the individual orders.     Medications   sodium chloride 0.9 % flush 10 mL (has no administration in time range)   Potassium Replacement - Follow Nurse / BPA Driven Protocol (has no administration in time range)   Magnesium Standard Dose Replacement - Follow Nurse / BPA Driven Protocol (has no administration in time range)   Phosphorus Replacement - Follow Nurse / BPA Driven Protocol (has no administration in time range)   Calcium Replacement - Follow Nurse / BPA Driven Protocol (has no administration in time range)   acetaminophen (TYLENOL) tablet 650 mg (has no administration in time range)     Or   acetaminophen (TYLENOL) 160 MG/5ML oral solution 650 mg (has no administration in time range)     Or   acetaminophen (TYLENOL) suppository 650 mg (has no administration in time range)   sennosides-docusate (PERICOLACE) 8.6-50 MG per tablet 2 tablet (has no administration in time range)     And   polyethylene glycol (MIRALAX) packet 17 g (has no administration in time range)     And   bisacodyl (DULCOLAX) EC tablet 5 mg (has no administration in time range)     And   bisacodyl (DULCOLAX) suppository 10 mg (has no administration in time range)   melatonin tablet 5 mg (has no administration in time range)   ondansetron ODT  "(ZOFRAN-ODT) disintegrating tablet 4 mg (has no administration in time range)     Or   ondansetron (ZOFRAN) injection 4 mg (has no administration in time range)   cefTRIAXone (ROCEPHIN) 1,000 mg in sodium chloride 0.9 % 100 mL MBP (has no administration in time range)   HYDROmorphone (DILAUDID) injection 0.5 mg (0.5 mg Intravenous Given 7/14/25 1028)   ondansetron (ZOFRAN) injection 4 mg (4 mg Intravenous Given 7/14/25 1027)   cefTRIAXone (ROCEPHIN) 1,000 mg in sodium chloride 0.9 % 100 mL MBP (0 mg Intravenous Stopped 7/14/25 1257)   ondansetron (ZOFRAN) injection 4 mg (4 mg Intravenous Given 7/14/25 1653)   HYDROmorphone (DILAUDID) injection 0.5 mg (0.5 mg Intravenous Given 7/14/25 1653)     MRI Lumbar Spine Without Contrast  Result Date: 7/14/2025  Impression: Subacute compression fracture at T12 with interval mildly increased height loss measuring approximately 50%, previously up to 40%. Mild osseous retropulsion at T12 vertebral body level with up to mild spinal canal stenosis. Multilevel degenerative changes of the lumbar spine without high-grade spinal canal or foraminal stenosis. Electronically Signed: Dimitri Adler MD  7/14/2025 5:02 PM EDT  Workstation ID: [id number]                                                     Medical Decision Making  /64   Pulse 75   Temp 98.1 °F (36.7 °C)   Resp 24   Ht 162.6 cm (64.02\")   Wt 62 kg (136 lb 11 oz)   SpO2 92%   BMI 23.45 kg/m²      Chart review: Patient's recent ED visits and admissions from 6/21/2025 and 6/27/2025 were reviewed.    Patient is 75 old female who presents emergency department with ongoing complaints of back pain causing a reduction in mobility.  See full HPI with primary assessment and exam above.    Patient is placed into ED bed and gown for assessment and IV access maintained for labs and medication ministration if indicated.  Primary differentials for patient include musculoskeletal strain, osteomyelitis, compression " fracture.    Comorbidities:  has a past medical history of Anxiety, Depression, Elevated cholesterol, and Headache.    Discussion with provider: Dr. Rl aVn    Radiology interpretation:  Imaging reviewed by ED Attending physician and interpreted by radiologist.  MRI Lumbar Spine Without Contrast  Result Date: 7/14/2025  Impression: Subacute compression fracture at T12 with interval mildly increased height loss measuring approximately 50%, previously up to 40%. Mild osseous retropulsion at T12 vertebral body level with up to mild spinal canal stenosis. Multilevel degenerative changes of the lumbar spine without high-grade spinal canal or foraminal stenosis. Electronically Signed: Dimitri Adler MD  7/14/2025 5:02 PM EDT  Workstation ID: [id number]    Lab interpretation:  Labs viewed by me significant for UA and microscopy consistent with UTI with hematuria, acute kidney injury with mild electrolyte derangement on CMP, and leukocytosis to 12.8 on CBC.    EKG considered but not clinically indicated due to the patient's complaint and presentation at this time.    Medications given in ED and ordered for admission:  sodium chloride 0.9 % flush 10 mL (has no administration in time range)  Potassium Replacement - Follow Nurse / BPA Driven Protocol (has no administration in time range)  Magnesium Standard Dose Replacement - Follow Nurse / BPA Driven Protocol (has no administration in time range)  Phosphorus Replacement - Follow Nurse / BPA Driven Protocol (has no administration in time range)  Calcium Replacement - Follow Nurse / BPA Driven Protocol (has no administration in time range)  acetaminophen (TYLENOL) tablet 650 mg (has no administration in time range)    Or  acetaminophen (TYLENOL) 160 MG/5ML oral solution 650 mg (has no administration in time range)    Or  acetaminophen (TYLENOL) suppository 650 mg (has no administration in time range)  sennosides-docusate (PERICOLACE) 8.6-50 MG per tablet 2 tablet (has no  administration in time range)    And  polyethylene glycol (MIRALAX) packet 17 g (has no administration in time range)    And  bisacodyl (DULCOLAX) EC tablet 5 mg (has no administration in time range)    And  bisacodyl (DULCOLAX) suppository 10 mg (has no administration in time range)  melatonin tablet 5 mg (has no administration in time range)  ondansetron ODT (ZOFRAN-ODT) disintegrating tablet 4 mg (has no administration in time range)    Or  ondansetron (ZOFRAN) injection 4 mg (has no administration in time range)  cefTRIAXone (ROCEPHIN) 1,000 mg in sodium chloride 0.9 % 100 mL MBP (has no administration in time range)  HYDROmorphone (DILAUDID) injection 0.5 mg (0.5 mg Intravenous Given 7/14/25 1028)  ondansetron (ZOFRAN) injection 4 mg (4 mg Intravenous Given 7/14/25 1027)  cefTRIAXone (ROCEPHIN) 1,000 mg in sodium chloride 0.9 % 100 mL MBP (0 mg Intravenous Stopped 7/14/25 1257)  ondansetron (ZOFRAN) injection 4 mg (4 mg Intravenous Given 7/14/25 1653)  HYDROmorphone (DILAUDID) injection 0.5 mg (0.5 mg Intravenous Given 7/14/25 1653)    Results reviewed and plan of care discussed with patient at bedside.  Repeat physical exam completed at this time; exam is unchanged from previous, patient remains alert and oriented, nontoxic and afebrile with GCS 15.  Patient is informed the plan of care will be to admit her to the hospital for further evaluation and treatment of spinal injury with a neurosurgical consult.  Patient verbalizes understanding of and is amenable to this plan.  Hospitalist RAJAT given full report of patient's ED course prior to patient leaving the emergency department.    Appropriate PPE worn during exam.    I discussed findings with patient who voiced understanding of discharge instructions, signs and symptoms requiring return to ED; discharged improved and in stable condition with follow up for re-evaluation.  This document is intended for medical expert use only. Reading of this document by  patients and/or patient's family without participating medical staff guidance may result in misinterpretation and unintended morbidity.  Any interpretation of such data is the responsibility of the patient and/or family member responsible for the patient in concert with their primary or specialist providers, not to be left for sources of online searches such as [employer], [employer] or similar queries. Relying on these approaches to knowledge may result in misinterpretation, misguided goals of care and even death should patients or family members try recommendations outside of the realm of professional medical care in a supervised inpatient environment.       Problems Addressed:  Acute midline low back pain without sciatica: complicated acute illness or injury  Acute midline thoracic back pain: complicated acute illness or injury  DOMINIQUE (acute kidney injury): complicated acute illness or injury  Compression fracture of T12 vertebra, initial encounter: complicated acute illness or injury    Amount and/or Complexity of Data Reviewed  Labs: ordered.  Radiology: ordered.    Risk  Prescription drug management.  Decision regarding hospitalization.        Final diagnoses:   Compression fracture of T12 vertebra, initial encounter   Acute midline low back pain without sciatica   Acute midline thoracic back pain   DOMINIQUE (acute kidney injury)       ED Disposition  ED Disposition       ED Disposition   Decision to Admit    Condition   --    Comment   Level of Care: Med/Surg [1]   Admitting Physician: LILLIAN OROURKE [[id number]]   Attending Physician: LILLIAN OROURKE [[id number]]                 No follow-up provider specified.       Medication List      No changes were made to your prescriptions during this visit.            Lamonte Bowman PA-C  07/14/25 1800

## 2025-07-14 NOTE — CASE MANAGEMENT/SOCIAL WORK
Discharge Planning Assessment   Hi     Patient Name: Yaneli Hernandez  MRN: [id number]  Today's Date: 7/14/2025    Admit Date: 7/14/2025    Plan: From Home/PT/OT pending Pt. prefers [facility]   Discharge Needs Assessment       Row Name 07/14/25 1908       Living Environment    People in Home sibling(s)    Name(s) of People in Home Sister Alivia    Current Living Arrangements home    Potentially Unsafe Housing Conditions none    In the past 12 months has the electric, gas, oil, or water company threatened to shut off services in your home? No    Primary Care Provided by other (see comments)  Sister Alivia    Provides Primary Care For no one    Family Caregiver if Needed sibling(s)    Family Caregiver Names Sister Alivia    Quality of Family Relationships helpful;involved;supportive    Able to Return to Prior Arrangements yes       Resource/Environmental Concerns    Resource/Environmental Concerns none    Transportation Concerns none       Transportation Needs    In the past 12 months, has lack of transportation kept you from medical appointments or from getting medications? no    In the past 12 months, has lack of transportation kept you from meetings, work, or from getting things needed for daily living? No       Food Insecurity    Within the past 12 months, you worried that your food would run out before you got the money to buy more. Never true    Within the past 12 months, the food you bought just didn't last and you didn't have money to get more. Never true       Transition Planning    Patient/Family Anticipates Transition to home with family    Patient/Family Anticipated Services at Transition none    Transportation Anticipated family or friend will provide  Sister Alivia can transport at d/c       Discharge Needs Assessment    Readmission Within the Last 30 Days no previous admission in last 30 days    Equipment Currently Used at Home rollator    Do you want help finding or keeping work or a job? I do not need or  want help    Do you want help with school or training? For example, starting or completing job training or getting a high school diploma, GED or equivalent No                   Discharge Plan       Row Name 07/14/25 1911       Plan    Plan From Home/PT/OT pending Pt. prefers [facility]    Patient/Family in Agreement with Plan yes    Plan Comments CM spoke to pt. Yaneli and her sister Alivia. Pt. temporarily is living with Alivia. PCP and pharmacy confirmed. Pt. denies financial, transportation, or medication issues.  Pt. wants [facility] for rehab if recommended.  Liaison for [facility] notified. Pt's sister Alivia can transport at d/c. DC barriers: PT/OT pending               Continued Care and Services - Admitted Since 7/14/2025       Destination       Service Provider Request Status Services Address Phone Fax Patient Preferred    [facility] INPT Pending - Request Sent -- [address] [facility] [place] [place] [address] [phone] --                   Demographic Summary       Row Name 07/14/25 1905       General Information    Admission Type observation    Arrived From home    Required Notices Provided Observation Status Notice    Referral Source admission list    Reason for Consult discharge planning    Preferred Language English       Contact Information    Permission Granted to Share Info With     Contact Information Obtained for                    Functional Status       Row Name 07/14/25 1906       Functional Status    Usual Activity Tolerance good    Current Activity Tolerance moderate       Physical Activity    On average, how many days per week do you engage in moderate to strenuous exercise (like a brisk walk)? 7 days    On average, how many minutes do you engage in exercise at this level? 20 min    Number of minutes of exercise per week 140       Functional Status, IADL    Medications independent    Meal Preparation assistive person  Sister Alivia     Housekeeping assistive person  Sister Alivia    Laundry assistive person  Sister Alivia    Shopping assistive person  Sister Alivia    If for any reason you need help with day-to-day activities such as bathing, preparing meals, shopping, managing finances, etc., do you get the help you need? I get all the help I need                  Patient Forms       Row Name 07/14/25 1923       Patient Forms    Important Message from Medicare ([facility]) Delivered  MELCHOR per registration 7/14/2025    Patient Observation Letter Delivered  MELCHOR per registration 7/14/2025             Aemena Serna RN    Office: [phone]  Fax: [phone]  [email or web address]

## 2025-07-14 NOTE — DISCHARGE PLACEMENT REQUEST
"Anuj Hernandez (75 y.o. Female)       Date of Birth   1950    Social Security Number       Address   [address] [place] [address] [address] [place] [facility]    Home Phone   [phone]    MRN   [id number]       Catholic   None    Marital Status                               Admission Date   7/14/2025    Admission Type   Emergency    Admitting Provider   Riky Wilkinson MD    Attending Provider   Riky Wilkinson MD    Department, Room/Bed   [facility] EMERGENCY DEPARTMENT, 35/35       Discharge Date       Discharge Disposition       Discharge Destination                                 Attending Provider: Riky Wilkinson MD    Allergies: Topamax [Topiramate], Codeine    Isolation: None   Infection: None   Code Status: CPR    Ht: 162.6 cm (64.02\")   Wt: 62 kg (136 lb 11 oz)    Admission Cmt: None   Principal Problem: Generalized weakness [R53.1]                   Active Insurance as of 7/14/2025       Primary Coverage       Payor Plan Insurance Group Employer/Plan Group    MEDICARE MEDICARE A & B        Payor Plan Address Payor Plan Phone Number Payor Plan Fax Number Effective Dates    [address] [phone]  4/1/2016 - None Entered    [place] [id number]         Subscriber Name Subscriber Birth Date Member ID       ANUJ HERNANDEZ 1950 [id number]                     Emergency Contacts        (Rel.) Home Phone Work Phone Mobile Phone    Bright Trejo (Significant Other) [phone] -- [phone]    RAMAKRISHNA Hernandez (Daughter) -- -- [phone]    Alivia Solitario (Sister) -- -- [phone]                "

## 2025-07-15 ENCOUNTER — APPOINTMENT (OUTPATIENT)
Dept: CT IMAGING | Facility: HOSPITAL | Age: 75
End: 2025-07-15
Payer: MEDICARE

## 2025-07-15 PROCEDURE — 25510000001 IOPAMIDOL PER 1 ML: Performed by: INTERNAL MEDICINE

## 2025-07-15 PROCEDURE — 25010000002 CEFTRIAXONE PER 250 MG

## 2025-07-15 PROCEDURE — 97162 PT EVAL MOD COMPLEX 30 MIN: CPT

## 2025-07-15 PROCEDURE — 25010000002 ONDANSETRON PER 1 MG

## 2025-07-15 PROCEDURE — 25010000002 PROCHLORPERAZINE 10 MG/2ML SOLUTION: Performed by: INTERNAL MEDICINE

## 2025-07-15 PROCEDURE — 96361 HYDRATE IV INFUSION ADD-ON: CPT

## 2025-07-15 PROCEDURE — 74177 CT ABD & PELVIS W/CONTRAST: CPT

## 2025-07-15 PROCEDURE — 97166 OT EVAL MOD COMPLEX 45 MIN: CPT | Performed by: OCCUPATIONAL THERAPIST

## 2025-07-15 PROCEDURE — 96375 TX/PRO/DX INJ NEW DRUG ADDON: CPT

## 2025-07-15 PROCEDURE — G0378 HOSPITAL OBSERVATION PER HR: HCPCS

## 2025-07-15 PROCEDURE — 96376 TX/PRO/DX INJ SAME DRUG ADON: CPT

## 2025-07-15 PROCEDURE — 99203 OFFICE O/P NEW LOW 30 MIN: CPT

## 2025-07-15 RX ORDER — AMLODIPINE BESYLATE 5 MG/1
10 TABLET ORAL
Status: DISCONTINUED | OUTPATIENT
Start: 2025-07-15 | End: 2025-07-17 | Stop reason: HOSPADM

## 2025-07-15 RX ORDER — ALPRAZOLAM 1 MG/1
1 TABLET ORAL EVERY 6 HOURS PRN
Status: DISCONTINUED | OUTPATIENT
Start: 2025-07-15 | End: 2025-07-17 | Stop reason: HOSPADM

## 2025-07-15 RX ORDER — HYDRALAZINE HYDROCHLORIDE 25 MG/1
25 TABLET, FILM COATED ORAL 3 TIMES DAILY
Status: DISCONTINUED | OUTPATIENT
Start: 2025-07-15 | End: 2025-07-17 | Stop reason: HOSPADM

## 2025-07-15 RX ORDER — PROCHLORPERAZINE EDISYLATE 5 MG/ML
10 INJECTION INTRAMUSCULAR; INTRAVENOUS EVERY 6 HOURS PRN
Status: DISCONTINUED | OUTPATIENT
Start: 2025-07-15 | End: 2025-07-17 | Stop reason: HOSPADM

## 2025-07-15 RX ORDER — IOPAMIDOL 755 MG/ML
100 INJECTION, SOLUTION INTRAVASCULAR
Status: COMPLETED | OUTPATIENT
Start: 2025-07-15 | End: 2025-07-15

## 2025-07-15 RX ADMIN — PROCHLORPERAZINE EDISYLATE 10 MG: 5 INJECTION INTRAMUSCULAR; INTRAVENOUS at 11:34

## 2025-07-15 RX ADMIN — IOPAMIDOL 100 ML: 755 INJECTION, SOLUTION INTRAVENOUS at 09:46

## 2025-07-15 RX ADMIN — HYDRALAZINE HYDROCHLORIDE 25 MG: 25 TABLET ORAL at 12:17

## 2025-07-15 RX ADMIN — ONDANSETRON 4 MG: 2 INJECTION, SOLUTION INTRAMUSCULAR; INTRAVENOUS at 10:13

## 2025-07-15 RX ADMIN — AMLODIPINE BESYLATE 10 MG: 5 TABLET ORAL at 12:17

## 2025-07-15 RX ADMIN — HYDRALAZINE HYDROCHLORIDE 25 MG: 25 TABLET ORAL at 16:22

## 2025-07-15 RX ADMIN — CEFTRIAXONE SODIUM 1000 MG: 1 INJECTION, POWDER, FOR SOLUTION INTRAMUSCULAR; INTRAVENOUS at 11:31

## 2025-07-15 NOTE — PLAN OF CARE
Goal Outcome Evaluation:  Plan of Care Reviewed With: patient, sibling           Outcome Evaluation: Pt is a 75 y.o. female with a CMH of HTN, HLD, anxiety, depression who presented to [facility] on 7/14/2025 with back pain, generalized weakness.  Patient reports previously falling on 6/17/2025 and that she was evaluated at this facility.  CT head, cervical spine, pelvis with no noted acute findings, Pt had went to \[facility]\"" after recovering from an infection during her last hospitalization,but had returned to staying at her sister's home & began having inc back pain and difficulty ambulating. e with c/o worsening back pain after fall on 6/17. MRI (+) for acute T12 compression fx.  ELENA consult stating TLSO when OOB- if pain well controlled with TLSO wear for 6 weeks.  If pain not well controlled with use of TLSO possible kyphoplasty.  Pt reports she has temporarily been living with her sister but plans to ultimately retun to living alone in a 1 story home with 4 steps to enter into home.  Pt was previously ind with all ADLs, mobility & driving. She has a RW & rollator, but doesn't currently use either of them. Upon eval, pt is A&O X4. She c/o pain in supine of 7/10 & nausea this date. She completed bed mobilty (log rolling tech) & sit<>stand with min A with RW. She required max A to cecilia the TLSO sitting EOB. She did report a significant dec in pain from 7/10 to 0/10 after donning of brace & with pain meds given. She is requiring min A for LB ADLs. Pt was educated pt on spinal precautions.  OT will continue to follow for tx to address generalized weakness, inc pain, dec standing balance/endurance & activity tolerance to maximize ind & safety with BADLs & mobility. Recommend inpt rehab upon discharge.    Anticipated Discharge Disposition (OT): inpatient rehabilitation facility

## 2025-07-15 NOTE — THERAPY EVALUATION
Patient Name: Yaneli Hernandez  : 1950    MRN: [id number]                              Today's Date: 7/15/2025       Admit Date: 2025    Visit Dx:     ICD-10-CM ICD-9-CM   1. Compression fracture of T12 vertebra, initial encounter  S22.080A 805.2   2. Acute midline low back pain without sciatica  M54.50 724.2   3. Acute midline thoracic back pain  M54.6 724.1   4. DOMINIQUE (acute kidney injury)  N17.9 584.9     Patient Active Problem List   Diagnosis    Chronic kidney disease, stage 3a    Hyperlipidemia    Palpitations    Syncope and collapse    Low back pain radiating to right lower extremity    Fall    Pain of right hip    Impaired mobility    Chest pain    Generalized weakness     Past Medical History:   Diagnosis Date    Anxiety     Depression     Elevated cholesterol     Headache     Hypertension      Past Surgical History:   Procedure Laterality Date    SHOULDER ARTHROSCOPY W/ ROTATOR CUFF REPAIR Right 2016    Procedure: RT SHOULDER MANIPULATION, RT SHOULDER ARTHROSCOPY WITH ROTATOR CUFF REPAIR, BANKART REPAIR  AND ACROMIOPLASTY ;  Surgeon: Francesco Wall MD;  Location: [facility] OR [facility] [place];  Service:       General Information       Row Name 07/15/25 1525          OT Time and Intention    Document Type evaluation  -DT     Mode of Treatment occupational therapy  -DT     Symptoms Noted During/After Treatment significant change in vital signs  -DT     Comment Dec pain with donning of TLSO  -DT       Row Name 07/15/25 1525          General Information    Patient Profile Reviewed yes  -DT     Prior Level of Function independent:;all household mobility;community mobility;driving;home management;ADL's  -DT     Existing Precautions/Restrictions fall;spinal;brace worn when out of bed;TLSO  -DT       Row Name 07/15/25 1525          Living Environment    Current Living Arrangements home  -DT     People in Home alone  -DT       Row Name 07/15/25 1525          Home Main Entrance    Number of Stairs, Main Entrance  four  -DT     Stair Railings, Main Entrance railings safe and in good condition  -DT       Row Name 07/15/25 1525          Stairs Within Home, Primary    Number of Stairs, Within Home, Primary none  -DT       Row Name 07/15/25 1525          Cognition    Orientation Status (Cognition) oriented x 4  -DT       Row Name 07/15/25 1525          Safety Issues/Impairments Affecting Functional Mobility    Impairments Affecting Function (Mobility) balance;endurance/activity tolerance;pain;strength  -DT               User Key  (r) = Recorded By, (t) = Taken By, (c) = Cosigned By      Initials Name Provider Type    DT Hannah Strauss, OT Occupational Therapist                     Mobility/ADL's       Row Name 07/15/25 1526          Bed Mobility    Bed Mobility bed mobility (all) activities  -DT     All Activities, [place] (Bed Mobility) minimum assist (75% patient effort);verbal cues  -DT     Assistive Device (Bed Mobility) bed rails  -DT     Comment, (Bed Mobility) v.c. for log rolling tech  -DT       Row Name 07/15/25 1526          Transfers    Transfers sit-stand transfer;stand-sit transfer  -DT       Row Name 07/15/25 1526          Sit-Stand Transfer    Sit-Stand [place] (Transfers) minimum assist (75% patient effort);1 person assist  -DT     Assistive Device (Sit-Stand Transfers) walker, front-wheeled  -DT       Row Name 07/15/25 1526          Stand-Sit Transfer    Stand-Sit [place] (Transfers) minimum assist (75% patient effort)  -DT     Assistive Device (Stand-Sit Transfers) walker, front-wheeled  -DT       Row Name 07/15/25 1526          Functional Mobility    Patient was able to Ambulate no, other medical factors prevent ambulation  -DT     Reason Patient was unable to Ambulate Nausea/Vomiting;Other (Comment)  Pt c/o feeling dizzy.  -DT       Row Name 07/15/25 1526          Activities of Daily Living    BADL Assessment/Intervention lower body dressing;upper body dressing  -DT       Row Name  07/15/25 1526          Lower Body Dressing Assessment/Training    [place] Level (Lower Body Dressing) lower body dressing skills;minimum assist (75% patient effort)  -DT     Position (Lower Body Dressing) edge of bed sitting  -DT       Row Name 07/15/25 1526          Upper Body Dressing Assessment/Training    [place] Level (Upper Body Dressing) upper body dressing skills;other (see comments);maximum assist (25% patient effort)  -DT     Position (Upper Body Dressing) edge of bed sitting  -DT     Comment, (Upper Body Dressing) Donning TLSO brace = max A.  -DT               User Key  (r) = Recorded By, (t) = Taken By, (c) = Cosigned By      Initials Name Provider Type    DT Hannah Strauss, OT Occupational Therapist                   Obj/Interventions       Row Name 07/15/25 1527          Sensory Assessment (Somatosensory)    Sensory Assessment (Somatosensory) sensation intact  -DT       Row Name 07/15/25 1527          Range of Motion Comprehensive    General Range of Motion upper extremity range of motion deficits identified  -DT     Comment, General Range of Motion RUE shoulder flex approx 80-90 AROM; LUE shoulder flex approx 100-110 flex AROM  -DT       Row Name 07/15/25 1527          Strength Comprehensive (MMT)    General Manual Muscle Testing (MMT) Assessment upper extremity strength deficits identified  -DT     Comment, General Manual Muscle Testing (MMT) Assessment BUE = grossly 3+/5 except for bilateral shoulder flex = 2/5.  -DT       Row Name 07/15/25 1527          Motor Skills    Motor Skills functional endurance  -DT     Functional Endurance fair-; limited by nausea, dizziness.  -DT       Row Name 07/15/25 1527          Balance    Balance Assessment sitting static balance;sitting dynamic balance;standing static balance;standing dynamic balance  -DT     Static Sitting Balance standby assist  -DT     Dynamic Sitting Balance standby assist  -DT     Position, Sitting Balance sitting edge of  bed  -DT     Static Standing Balance contact guard  -DT     Position/Device Used, Standing Balance walker, front-wheeled  -DT               User Key  (r) = Recorded By, (t) = Taken By, (c) = Cosigned By      Initials Name Provider Type    DT Hannah Strauss, OT Occupational Therapist                   Goals/Plan       Row Name 07/15/25 1531          Transfer Goal 1 (OT)    Activity/Assistive Device (Transfer Goal 1, OT) transfers, all  -DT     [place] Level/Cues Needed (Transfer Goal 1, OT) modified independence  -DT     Time Frame (Transfer Goal 1, OT) 2 weeks  -DT       Row Name 07/15/25 1531          Bathing Goal 1 (OT)    Activity/Device (Bathing Goal 1, OT) bathing skills, all  -DT     [place] Level/Cues Needed (Bathing Goal 1, OT) modified independence  -DT     Time Frame (Bathing Goal 1, OT) 2 weeks  -DT       Row Name 07/15/25 1531          Dressing Goal 1 (OT)    Activity/Device (Dressing Goal 1, OT) dressing skills, all  -DT     [place]/Cues Needed (Dressing Goal 1, OT) modified independence  -DT     Time Frame (Dressing Goal 1, OT) 2 weeks  -DT       Row Name 07/15/25 1531          Toileting Goal 1 (OT)    Activity/Device (Toileting Goal 1, OT) toileting skills, all  -DT     [place] Level/Cues Needed (Toileting Goal 1, OT) modified independence  -DT     Time Frame (Toileting Goal 1, OT) 2 weeks  -DT       Row Name 07/15/25 1531          Therapy Assessment/Plan (OT)    Planned Therapy Interventions (OT) activity tolerance training;BADL retraining;functional balance retraining;occupation/activity based interventions;passive ROM/stretching;patient/caregiver education/training;adaptive equipment training;ROM/therapeutic exercise;strengthening exercise;transfer/mobility retraining  -DT               User Key  (r) = Recorded By, (t) = Taken By, (c) = Cosigned By      Initials Name Provider Type    Hannah Samuels, OT Occupational Therapist                   Clinical  Impression       Row Name 07/15/25 1529          Pain Assessment    Pretreatment Pain Rating 7/10  -DT     Posttreatment Pain Rating 0/10 - no pain  -DT     Pain Location back  -DT     Pain Management Interventions exercise or physical activity utilized;premedicated for activity;other (see comments)  TLSO in place  -DT     Response to Pain Interventions activity participation with decreased pain  -DT       Row Name 07/15/25 1529          Plan of Care Review    Plan of Care Reviewed With patient;sibling  -DT     Outcome Evaluation Pt is a 75 y.o. female with a CMH of HTN, HLD, anxiety, depression who presented to [facility] on 7/14/2025 with back pain, generalized weakness.  Patient reports previously falling on 6/17/2025 and that she was evaluated at this facility.  CT head, cervical spine, pelvis with no noted acute findings, Pt had went to [facility] after recovering from an infection during her last hospitalization,but had returned to staying at her sister's home & began having inc back pain and difficulty ambulating. e with c/o worsening back pain after fall on 6/17. MRI (+) for acute T12 compression fx.  ELENA consult stating TLSO when OOB- if pain well controlled with TLSO wear for 6 weeks.  If pain not well controlled with use of TLSO possible kyphoplasty.  Pt reports she has temporarily been living with her sister but plans to ultimately retun to living alone in a 1 story home with 4 steps to enter into home.  Pt was previously ind with all ADLs, mobility & driving. She has a RW & rollator, but doesn't currently use either of them. Upon eval, pt is A&O X4. She c/o pain in supine of 7/10 & nausea this date. She completed bed mobilty (log rolling tech) & sit<>stand with min A with RW. She required max A to cecilia the TLSO sitting EOB. She did report a significant dec in pain from 7/10 to 0/10 after donning of brace & with pain meds given. She is requiring min A for LB ADLs. Pt was educated pt on spinal  precautions.  OT will continue to follow for tx to address generalized weakness, inc pain, dec standing balance/endurance & activity tolerance to maximize ind & safety with BADLs & mobility. Recommend inpt rehab upon discharge.  -DT       Row Name 07/15/25 1529          Therapy Assessment/Plan (OT)    Rehab Potential (OT) good  -DT     Criteria for Skilled Therapeutic Interventions Met (OT) yes;skilled treatment is necessary;meets criteria  -DT     Therapy Frequency (OT) 5 times/wk  -DT     Predicted Duration of Therapy Intervention (OT) until d/c  -DT       Row Name 07/15/25 1529          Therapy Plan Review/Discharge Plan (OT)    Anticipated Discharge Disposition (OT) inpatient rehabilitation facility  -DT       Row Name 07/15/25 1529          Vital Signs    Intra Systolic BP Rehab 152  -DT     Intra Treatment Diastolic BP 94  -DT       Row Name 07/15/25 1529          Positioning and Restraints    Pre-Treatment Position in bed  -DT     Post Treatment Position bed  -DT     In Bed notified nsg;supine;call light within reach;encouraged to call for assist;exit alarm on;with family/caregiver;side rails up x2  -DT               User Key  (r) = Recorded By, (t) = Taken By, (c) = Cosigned By      Initials Name Provider Type    DT Hannah Strauss, OT Occupational Therapist                   Outcome Measures       Row Name 07/15/25 1443 07/15/25 0747       How much help from another person do you currently need...    Turning from your back to your side while in flat bed without using bedrails? 3  -AM 4  -SL    Moving from lying on back to sitting on the side of a flat bed without bedrails? 3  -AM 3  -SL    Moving to and from a bed to a chair (including a wheelchair)? 3  -AM 3  -SL    Standing up from a chair using your arms (e.g., wheelchair, bedside chair)? 3  -AM 3  -SL    Climbing 3-5 steps with a railing? 1  -AM 3  -SL    To walk in hospital room? 1  -AM 3  -SL    AM-PAC 6 Clicks Score (PT) 14  -AM 19  -SL     Highest Level of Mobility Goal Move to Chair/Commode-4  -AM Walk 10 Steps or More-6  -SL      Row Name 07/15/25 1443          Functional Assessment    Outcome Measure Options AM-PAC 6 Clicks Basic Mobility (PT)  -AM               User Key  (r) = Recorded By, (t) = Taken By, (c) = Cosigned By      Initials Name Provider Type    AM Art Perdomo, PT Physical Therapist    Margaux Antonio, RN Registered Nurse                    Occupational Therapy Education       Title: PT OT SLP Therapies (In Progress)       Topic: Occupational Therapy (In Progress)       Point: ADL training (Done)       Learning Progress Summary            Patient Acceptance, E,TB,D, VU,NR,Bed IU by DT at 7/15/2025 1532    Comment: Role of OT, goals & POC; spinal prec, TLSO use/application, safety prec.   Family Acceptance, E,TB,D, VU,NR,Bed IU by DT at 7/15/2025 1532    Comment: Role of OT, goals & POC; spinal prec, TLSO use/application, safety prec.                      Point: Precautions (Done)       Learning Progress Summary            Patient Acceptance, E,TB,D, VU,NR,Bed IU by DT at 7/15/2025 1532    Comment: Role of OT, goals & POC; spinal prec, TLSO use/application, safety prec.   Family Acceptance, E,TB,D, VU,NR,Bed IU by DT at 7/15/2025 1532    Comment: Role of OT, goals & POC; spinal prec, TLSO use/application, safety prec.                      Point: Body mechanics (Done)       Learning Progress Summary            Patient Acceptance, E,TB,D, VU,NR,Bed IU by DT at 7/15/2025 1532    Comment: Role of OT, goals & POC; spinal prec, TLSO use/application, safety prec.   Family Acceptance, E,TB,D, VU,NR,Bed IU by DT at 7/15/2025 1532    Comment: Role of OT, goals & POC; spinal prec, TLSO use/application, safety prec.                                      User Key       Initials Effective Dates Name Provider Type Discipline    DT 07/11/23 -  Hannah Strauss, OT Occupational Therapist OT                  OT Recommendation and  Plan  Planned Therapy Interventions (OT): activity tolerance training, BADL retraining, functional balance retraining, occupation/activity based interventions, passive ROM/stretching, patient/caregiver education/training, adaptive equipment training, ROM/therapeutic exercise, strengthening exercise, transfer/mobility retraining  Therapy Frequency (OT): 5 times/wk  Plan of Care Review  Plan of Care Reviewed With: patient, sibling  Outcome Evaluation: Pt is a 75 y.o. female with a CMH of HTN, HLD, anxiety, depression who presented to [facility] on 7/14/2025 with back pain, generalized weakness.  Patient reports previously falling on 6/17/2025 and that she was evaluated at this facility.  CT head, cervical spine, pelvis with no noted acute findings, Pt had went to [facility] after recovering from an infection during her last hospitalization,but had returned to staying at her sister's home & began having inc back pain and difficulty ambulating. e with c/o worsening back pain after fall on 6/17. MRI (+) for acute T12 compression fx.  ELENA consult stating TLSO when OOB- if pain well controlled with TLSO wear for 6 weeks.  If pain not well controlled with use of TLSO possible kyphoplasty.  Pt reports she has temporarily been living with her sister but plans to ultimately retun to living alone in a 1 story home with 4 steps to enter into home.  Pt was previously ind with all ADLs, mobility & driving. She has a RW & rollator, but doesn't currently use either of them. Upon eval, pt is A&O X4. She c/o pain in supine of 7/10 & nausea this date. She completed bed mobilty (log rolling tech) & sit<>stand with min A with RW. She required max A to cecilia the TLSO sitting EOB. She did report a significant dec in pain from 7/10 to 0/10 after donning of brace & with pain meds given. She is requiring min A for LB ADLs. Pt was educated pt on spinal precautions.  OT will continue to follow for tx to address generalized weakness, inc pain,  dec standing balance/endurance & activity tolerance to maximize ind & safety with BADLs & mobility. Recommend inpt rehab upon discharge.     Time Calculation:         Time Calculation- OT       Row Name 07/15/25 1533             Time Calculation- OT    OT Start Time 1305  -DT      OT Stop Time 1333  -DT      OT Time Calculation (min) 28 min  -DT      OT Received On 07/15/25  -DT      OT - Next Appointment 07/16/25  -DT      OT Goal Re-Cert Due Date 07/29/25  -DT                User Key  (r) = Recorded By, (t) = Taken By, (c) = Cosigned By      Initials Name Provider Type    DT Hannah Strauss, OT Occupational Therapist                           Hannah Strauss, OT  7/15/2025

## 2025-07-15 NOTE — CONSULTS
[facility] NEUROSURGERY CONSULT NOTE    Patient name: Yaneli Hernandez  Referring Provider:     Lamonte Bowman PA-C     Reason for Consultation: Compression Fx w/ increased height loss; been seen multiple times for pain causing inability to ambulate     Patient Care Team:  Mina Padilla PA as PCP - General (Family Medicine)  Mina Padilla PA as PCP - Family Medicine  Maycol Almeida MD as Consulting Physician (Pain Medicine)  Haris Holley MD as Consulting Physician (Nephrology)    Chief complaint: Back pain    Subjective .     History of present illness:    Patient is a 75 y.o. female who presents to clinic for back pain.  Patient states that 3 weeks ago she was showing someone her water meter when she accidentally fell and rolled down her driveway which was on an incline.  Patient went to the hospital a few days later due to low back pain.  There was no acute abnormality on imaging performed.  She was sent home.  Unfortunately, patient's pain continued to progress and she came back to the hospital for further evaluation and treatment.  She endorses left-sided back pain.  She denies any pain, numbness, tingling, weakness, bowel or bladder incontinence or saddle anesthesia at this time.      Review of Systems  Review of Systems   Musculoskeletal:  Positive for back pain.       History  PAST MEDICAL HISTORY  Past Medical History:   Diagnosis Date    Anxiety     Depression     Elevated cholesterol     Headache     Hypertension        PAST SURGICAL HISTORY  Past Surgical History:   Procedure Laterality Date    SHOULDER ARTHROSCOPY W/ ROTATOR CUFF REPAIR Right 08/24/2016    Procedure: RT SHOULDER MANIPULATION, RT SHOULDER ARTHROSCOPY WITH ROTATOR CUFF REPAIR, BANKART REPAIR  AND ACROMIOPLASTY ;  Surgeon: Francesco Wall MD;  Location: [facility] OR [facility];  Service:        FAMILY HISTORY  Family History   Problem Relation Age of Onset    Heart failure Mother        SOCIAL HISTORY  Social History     Tobacco  Use    Smoking status: Never    Smokeless tobacco: Never   Vaping Use    Vaping status: Never Used   Substance Use Topics    Alcohol use: No    Drug use: No       Allergies:  Topamax [topiramate] and Codeine    MEDICATIONS:  Medications Prior to Admission   Medication Sig Dispense Refill Last Dose/Taking    acetaminophen (TYLENOL) 325 MG tablet Take 2 tablets by mouth Every 4 (Four) Hours As Needed for Mild Pain.   Taking As Needed    ALPRAZolam (XANAX) 1 MG tablet Take 1 tablet by mouth Every 6 (Six) Hours As Needed for Anxiety.   Taking As Needed    amLODIPine (NORVASC) 5 MG tablet Take 2 tablets by mouth Daily for 30 days. 60 tablet 0 7/13/2025    hydrALAZINE (APRESOLINE) 25 MG tablet Take 1 tablet by mouth 3 (Three) Times a Day.   7/13/2025 Evening    HYDROcodone-acetaminophen (NORCO) 5-325 MG per tablet Take 1 tablet by mouth Every 6 (Six) Hours As Needed for Mild Pain.   7/13/2025 at  9:30 PM    ondansetron ODT (ZOFRAN-ODT) 4 MG disintegrating tablet Place 1 tablet on the tongue Every 6 (Six) Hours As Needed for Nausea or Vomiting.   7/13/2025 Evening         Current Facility-Administered Medications:     acetaminophen (TYLENOL) tablet 650 mg, 650 mg, Oral, Q4H PRN **OR** acetaminophen (TYLENOL) 160 MG/5ML oral solution 650 mg, 650 mg, Oral, Q4H PRN **OR** acetaminophen (TYLENOL) suppository 650 mg, 650 mg, Rectal, Q4H PRN, Mono Mcclure PA-C    sennosides-docusate (PERICOLACE) 8.6-50 MG per tablet 2 tablet, 2 tablet, Oral, BID PRN **AND** polyethylene glycol (MIRALAX) packet 17 g, 17 g, Oral, Daily PRN **AND** bisacodyl (DULCOLAX) EC tablet 5 mg, 5 mg, Oral, Daily PRN **AND** bisacodyl (DULCOLAX) suppository 10 mg, 10 mg, Rectal, Daily PRN, Mono Mcclure PA-C    Calcium Replacement - Follow Nurse / BPA Driven Protocol, , Not Applicable, PRN, Mono Mcclure PA-C    cefTRIAXone (ROCEPHIN) 1,000 mg in sodium chloride 0.9 % 100 mL MBP, 1,000 mg, Intravenous, Q24H, Mono Mcclure PA-C    Magnesium  Standard Dose Replacement - Follow Nurse / BPA Driven Protocol, , Not Applicable, PRN, Mono Mcclure PA-C    melatonin tablet 5 mg, 5 mg, Oral, Nightly PRN, Mono Mcclure PA-C    ondansetron ODT (ZOFRAN-ODT) disintegrating tablet 4 mg, 4 mg, Oral, Q6H PRN **OR** ondansetron (ZOFRAN) injection 4 mg, 4 mg, Intravenous, Q6H PRN, Mono Mcclure PA-C, 4 mg at 07/14/25 1816    Phosphorus Replacement - Follow Nurse / BPA Driven Protocol, , Not Applicable, PRN, Mono Mcclure PA-C    Potassium Replacement - Follow Nurse / BPA Driven Protocol, , Not Applicable, PRNRen Sawsan, PA-C    [COMPLETED] Insert Peripheral IV, , , Once **AND** sodium chloride 0.9 % flush 10 mL, 10 mL, Intravenous, PRN, Lamonte Bowman PA-C      Objective     Results Review:  LABS:  Results from last 7 days   Lab Units 07/14/25 2252 07/14/25  1025   WBC 10*3/mm3 10.39 12.81*   HEMOGLOBIN g/dL 14.2 14.8   HEMATOCRIT % 43.9 46.1   PLATELETS 10*3/mm3 252 271     Results from last 7 days   Lab Units 07/14/25 2252 07/14/25  1025   SODIUM mmol/L 140 142   POTASSIUM mmol/L 3.8 5.4*   CHLORIDE mmol/L 102 100   CO2 mmol/L 22.1 20.8*   BUN mg/dL 12.0 11.2   CREATININE mg/dL 0.95 1.07*   CALCIUM mg/dL 9.6 10.5   BILIRUBIN mg/dL  --  0.9   ALK PHOS U/L  --  120*   ALT (SGPT) U/L  --  9   AST (SGOT) U/L  --  16   GLUCOSE mg/dL 168* 101*         DIAGNOSTICS:  MRI LUMBAR SPINE WO CONTRAST     Date of Exam: 7/14/2025 4:10 PM EDT     Indication: Fall 3 wks prior with ongoing pain.     Comparison: CT abdomen pelvis 6/14/2016.. CT pelvis 6/21/2025. Chest CT 6/27/2025.     Technique:  Routine multiplanar/multisequence sequence images of the lumbar spine were obtained without contrast administration.          Findings:  Vertebral numbering: The last well formed disc is labeled L5-S1.     Alignment: Mild dextroconvex curvature of the lumbar spine.     Vertebrae: Subacute compression fracture with bone marrow edema at T12 vertebral body with burst  component and a 3 mm osseous retropulsion. There has been interval mildly increased height loss measuring approximately 50%, previously up to 40%.. .    Multilevel degenerative endplate changes including Modic type I/fibrovascular endplate degenerative changes at L1-2..      Discs and spinal canal: Multilevel disc desiccation and disc height loss. Degenerative changes detailed below by level. Sacral Tarlov/perineural cyst.     Spinal cord and cauda equina: The visible spinal cord has normal signal and morphology. No cauda equina compression. The conus tip lies at L1 level..     Adjacent soft tissues: No significant abnormality.     Findings by level:     T11-12: Mild diffuse disc bulge. No significant facet arthropathy. No significant spinal canal or foraminal stenosis.     T12-L1: Mild diffuse disc bulge. Osseous retropulsion at T12 vertebral body level. No significant facet arthropathy. Up to mild spinal canal stenosis at T12 vertebral body level. No significant foraminal narrowing.     L1-2: Diffuse disc bulge with osteophytic ridging. Mild bilateral facet arthropathy.. No significant spinal canal stenosis. Mild left greater than right foraminal narrowing.     L2-3: Diffuse disc bulge with osteophytic ridging. Mild bilateral facet arthropathy. No significant spinal canal or foraminal stenosis.     L3-4: Diffuse disc bulge with osteophytic ridging. Moderate bilateral facet arthropathy with ligamentum flavum thickening.. Mild spinal canal stenosis. Mild bilateral foraminal narrowing.     L4-5: Diffuse disc bulge with osteophytic ridging. Moderate bilateral facet arthropathy with ligamentum flavum thickening. No significant spinal canal stenosis. Mild bilateral foraminal narrowing.     L5-S1: Diffuse disc bulge with osteophytic ridging. Mild bilateral facet arthropathy. No significant spinal canal or foraminal stenosis.           IMPRESSION:  Impression:  Subacute compression fracture at T12 with interval mildly  increased height loss measuring approximately 50%, previously up to 40%. Mild osseous retropulsion at T12 vertebral body level with up to mild spinal canal stenosis.     Multilevel degenerative changes of the lumbar spine without high-grade spinal canal or foraminal stenosis.    Results Review:   I reviewed the patient's new clinical results.  I personally viewed patient's chart and imaging      Vital Signs   Temp:  [97.8 °F (36.6 °C)-98.4 °F (36.9 °C)] 98.4 °F (36.9 °C)  Heart Rate:  [] 73  Resp:  [16-24] 17  BP: (109-146)/(55-97) 145/67    Physical Exam:  Physical Exam  Vitals reviewed.   Musculoskeletal:         General: Normal range of motion.      Cervical back: Normal range of motion.   Skin:     General: Skin is warm and dry.   Neurological:      General: No focal deficit present.      Mental Status: She is alert.   Psychiatric:         Mood and Affect: Mood normal.       Neurological Exam  Mental Status  Alert.      Assessment & Plan       Generalized weakness      Problem List Items Addressed This Visit    None  Visit Diagnoses         Compression fracture of T12 vertebra, initial encounter    -  Primary      Acute midline low back pain without sciatica          Acute midline thoracic back pain          DOMINIQUE (acute kidney injury)                 COMORBID CONDITIONS:  Chronic kidney disease, stage IIIa, hypertension    Patient is a 75-year-old female who presents to [facility] after falling and hurting her back.    Patient had an MRI of her lumbar spine showing subacute compression fracture at T12 with mild retropulsion up to mild spinal canal stenosis.    On physical examination patient has good strength in her lower extremities and I do not appreciate clonus on examination.  Patient does not endorse any radicular symptoms in her lower extremities at this time.    At this time we are going to have the patient try a TLSO brace.  We will see how she does throughout the day wearing the brace.  The  "patient does well and her pain is controlled, she will continue with the brace for the next 6 weeks.  However, if she continues to be in a lot of pain and does not do well with the brace we will go ahead and consult interventional radiology for possible kyphoplasty.    We will continue to monitor.  Please have patient wear her TLSO brace whenever she is out of bed.    Please reach out with any questions or concerns.      PLAN:   T12 compression fracture  - TLSO trial  - TLSO brace whenever out of bed  - Medical management per primary  - Pain management per primary  - Recommend physical therapy evaluation    I discussed the patient's findings and my recommendations with patient, nursing staff, and Dr. Mercado    During patient visit, I utilized appropriate personal protective equipment including gloves and mask.  Mask used was standard procedure mask. Appropriate PPE was worn during the entire visit.  Hand hygiene was completed before and after.     Marycarmen Tucker PA-C  07/15/25  08:59 EDT    \"Dictated utilizing Dragon dictation\".    "

## 2025-07-15 NOTE — PLAN OF CARE
Goal Outcome Evaluation:         Patient is alert and oriented. Patient is able to make needs known. Patient is resting at this time with no complaints.

## 2025-07-15 NOTE — PLAN OF CARE
Goal Outcome Evaluation:  Plan of Care Reviewed With: patient, sibling           Outcome Evaluation: Pt is a 76 y/o female with c/o worsening back pain after fall on 6/17.  Has been to ED x 2 since fall with c/o back pain.  MRI L-spine: subacute T12 compression fx.  ELENA consult:   TLSO when OOB- if pain well controlled with TLSO wear for 6 weeks.  If pain not well controlled with use of TLSO possible kyphoplasty.  Pt reports she has been living with her sister but plans to ultimately retun to living alone in a 1 story home with 4 steps to enter into home.  Educated pt on spinal precautions and application of TLSO.  Pt required Min A x 2 for supine to sit and sit to supine via logroll technique.  TLSO fitted to pt.  Sit to stand: Min A x 2 with RW.  Pt with c/o dizziness upon standing.  /94.  Pt unable to tolerate ambulation this date secondary to nausea and fatigue.  Pt reporting no pain with TLSO donned for sit to stand.  Recommend inpt rehab.    Anticipated Discharge Disposition (PT): inpatient rehabilitation facility

## 2025-07-15 NOTE — PLAN OF CARE
Goal Outcome Evaluation:   Patient a&ox4, pain & n/v controlled, up with PT using TLSO brace. Call light within reach, bed in low position. Plan of care on going.

## 2025-07-15 NOTE — THERAPY EVALUATION
Patient Name: Yaneli Hernandez  : 1950    MRN: [id number]                              Today's Date: 7/15/2025       Admit Date: 2025    Visit Dx:     ICD-10-CM ICD-9-CM   1. Compression fracture of T12 vertebra, initial encounter  S22.080A 805.2   2. Acute midline low back pain without sciatica  M54.50 724.2   3. Acute midline thoracic back pain  M54.6 724.1   4. DOMINIQUE (acute kidney injury)  N17.9 584.9     Patient Active Problem List   Diagnosis    Chronic kidney disease, stage 3a    Hyperlipidemia    Palpitations    Syncope and collapse    Low back pain radiating to right lower extremity    Fall    Pain of right hip    Impaired mobility    Chest pain    Generalized weakness     Past Medical History:   Diagnosis Date    Anxiety     Depression     Elevated cholesterol     Headache     Hypertension      Past Surgical History:   Procedure Laterality Date    SHOULDER ARTHROSCOPY W/ ROTATOR CUFF REPAIR Right 2016    Procedure: RT SHOULDER MANIPULATION, RT SHOULDER ARTHROSCOPY WITH ROTATOR CUFF REPAIR, BANKART REPAIR  AND ACROMIOPLASTY ;  Surgeon: Francesco Wall MD;  [place] [facility] [place] [facility] [place]  Service:       General Information       Row Name 07/15/25 1434          Physical Therapy Time and Intention    Document Type evaluation  -AM     Mode of Treatment physical therapy  -AM       Row Name 07/15/25 1434          General Information    Patient Profile Reviewed yes  -AM     Prior Level of Function independent:;all household mobility;community mobility;gait;transfer;bed mobility;using stairs  -AM     Existing Precautions/Restrictions fall;TLSO;spinal  -AM     Barriers to Rehab medically complex  -AM       Row Name 07/15/25 1434          Living Environment    Current Living Arrangements home  -AM     People in Home alone  -AM       Row Name 07/15/25 1434          Home Main Entrance    Number of Stairs, Main Entrance four  -AM     Stair Railings, Main Entrance railings safe and in good condition  -AM        Row Name 07/15/25 1434          Stairs Within Home, Primary    Number of Stairs, Within Home, Primary none  -AM       Row Name 07/15/25 1434          Cognition    Orientation Status (Cognition) oriented x 4  -AM       Row Name 07/15/25 1434          Safety Issues/Impairments Affecting Functional Mobility    Impairments Affecting Function (Mobility) balance;endurance/activity tolerance;pain;strength  -AM     Comment, Safety Issues/Impairments (Mobility) gait belt utilized  -AM               User Key  (r) = Recorded By, (t) = Taken By, (c) = Cosigned By      Initials Name Provider Type    AM Art Perdomo, PT Physical Therapist                   Mobility       Row Name 07/15/25 1435          Bed Mobility    Bed Mobility bed mobility (all) activities  -AM     All Activities, [place] (Bed Mobility) minimum assist (75% patient effort);2 person assist  -AM     Assistive Device (Bed Mobility) bed rails  -AM     Comment, (Bed Mobility) log roll technique  -AM       Row Name 07/15/25 1435          Sit-Stand Transfer    Sit-Stand [place] (Transfers) minimum assist (75% patient effort);1 person assist  -AM     Assistive Device (Sit-Stand Transfers) walker, front-wheeled  -AM     Comment, (Sit-Stand Transfer) cues for hand placement  -AM       Row Name 07/15/25 1435          Gait/Stairs (Locomotion)    [place] Level (Gait) not tested  -AM     Comment, (Gait/Stairs) unable to attempt ambulation this date secondary to nausea and fatigue  -AM               User Key  (r) = Recorded By, (t) = Taken By, (c) = Cosigned By      Initials Name Provider Type    AM Art Perdomo, PT Physical Therapist                   Obj/Interventions       Row Name 07/15/25 1436          Range of Motion Comprehensive    General Range of Motion bilateral lower extremity ROM WFL  -AM     Comment, General Range of Motion Defer BUE ROM to OT  -AM       Row Name 07/15/25 1436          Strength Comprehensive (MMT)    Comment, General Manual  Muscle Testing (MMT) Assessment Defer BUE MMT to OT.  BLEs: 4/5  -AM       Row Name 07/15/25 1436          Motor Skills    Motor Skills functional endurance  -AM     Functional Endurance poor  -AM       Row Name 07/15/25 1436          Balance    Balance Assessment sitting static balance;sitting dynamic balance;standing static balance  -AM     Static Sitting Balance supervision  -AM     Dynamic Sitting Balance supervision  -AM     Position, Sitting Balance unsupported;sitting edge of bed  -AM     Static Standing Balance contact guard  -AM     Position/Device Used, Standing Balance supported;walker, rolling  -AM       Row Name 07/15/25 1436          Sensory Assessment (Somatosensory)    Sensory Assessment (Somatosensory) sensation intact  -AM               User Key  (r) = Recorded By, (t) = Taken By, (c) = Cosigned By      Initials Name Provider Type    AM Art Perdomo, PT Physical Therapist                   Goals/Plan       Row Name 07/15/25 1443          Bed Mobility Goal 1 (PT)    Activity/Assistive Device (Bed Mobility Goal 1, PT) bed mobility activities, all  -AM     [place] Level/Cues Needed (Bed Mobility Goal 1, PT) modified independence  -AM     Time Frame (Bed Mobility Goal 1, PT) long term goal (LTG)  -AM     Strategies/Barriers (Bed Mobility Goal 1, PT) via logroll  -AM       Row Name 07/15/25 1443          Transfer Goal 1 (PT)    Activity/Assistive Device (Transfer Goal 1, PT) transfers, all;walker, rolling  -AM     [place] Level/Cues Needed (Transfer Goal 1, PT) modified independence  -AM     Time Frame (Transfer Goal 1, PT) long term goal (LTG)  -AM     Strategies/Barriers (Transfers Goal 1, PT) TLSO donned  -AM       Row Name 07/15/25 1443          Gait Training Goal 1 (PT)    Activity/Assistive Device (Gait Training Goal 1, PT) gait (walking locomotion);walker, rolling  -AM     [place] Level (Gait Training Goal 1, PT) modified independence  -AM     Distance (Gait Training Goal 1, PT)  150'  -AM     Time Frame (Gait Training Goal 1, PT) long term goal (LTG)  -AM     Strategies/Barriers (Gait Training Goal 1, PT) TLSO donned  -AM       Row Name 07/15/25 1443          Stairs Goal 1 (PT)    Activity/Assistive Device (Stairs Goal 1, PT) stairs, all skills  -AM     [place] Level/Cues Needed (Stairs Goal 1, PT) modified independence  -AM     Number of Stairs (Stairs Goal 1, PT) 4  -AM     Time Frame (Stairs Goal 1, PT) long term goal (LTG)  -AM       Row Name 07/15/25 1443          Therapy Assessment/Plan (PT)    Planned Therapy Interventions (PT) balance training;bed mobility training;gait training;strengthening;stair training;patient/family education;transfer training  -AM               User Key  (r) = Recorded By, (t) = Taken By, (c) = Cosigned By      Initials Name Provider Type    AM Art Perdomo, PT Physical Therapist                   Clinical Impression       Row Name 07/15/25 1437          Pain    Pretreatment Pain Rating 7/10  -AM     Posttreatment Pain Rating 0/10 - no pain  -AM     Pain Location back  -AM     Pain Side/Orientation lower  -AM     Pain Management Interventions exercise or physical activity utilized  -AM     Response to Pain Interventions activity participation with decreased pain  -AM       Row Name 07/15/25 1437          Plan of Care Review    Plan of Care Reviewed With patient;sibling  -AM     Outcome Evaluation Pt is a 74 y/o female with c/o worsening back pain after fall on 6/17.  Has been to ED x 2 since fall with c/o back pain.  MRI L-spine: subacute T12 compression fx.  ELENA consult:   TLSO when OOB- if pain well controlled with TLSO wear for 6 weeks.  If pain not well controlled with use of TLSO possible kyphoplasty.  Pt reports she has been living with her sister but plans to ultimately retun to living alone in a 1 story home with 4 steps to enter into home.  Educated pt on spinal precautions and application of TLSO.  Pt required Min A x 2 for supine to sit and  sit to supine via logroll technique.  TLSO fitted to pt.  Sit to stand: Min A x 2 with RW.  Pt with c/o dizziness upon standing.  /94.  Pt unable to tolerate ambulation this date secondary to nausea and fatigue.  Pt reporting no pain with TLSO donned for sit to stand.  Recommend inpt rehab.  -AM       Row Name 07/15/25 1437          Therapy Assessment/Plan (PT)    Patient/Family Therapy Goals Statement (PT) To get stronger  -AM     Rehab Potential (PT) good  -AM     Criteria for Skilled Interventions Met (PT) yes  -AM     Therapy Frequency (PT) 5 times/wk  -AM     Predicted Duration of Therapy Intervention (PT) until d/c  -AM       Row Name 07/15/25 1437          Vital Signs    Intra Systolic BP Rehab 152  -AM     Intra Treatment Diastolic BP 94  -AM     O2 Delivery Pre Treatment room air  -AM     O2 Delivery Intra Treatment room air  -AM     O2 Delivery Post Treatment room air  -AM     Pre Patient Position Supine  -AM     Intra Patient Position Standing  -AM     Post Patient Position Supine  -AM       Row Name 07/15/25 1437          Positioning and Restraints    Pre-Treatment Position in bed  -AM     Post Treatment Position bed  -AM     In Bed call light within reach;exit alarm on;encouraged to call for assist;with family/caregiver  -AM               User Key  (r) = Recorded By, (t) = Taken By, (c) = Cosigned By      Initials Name Provider Type    AM Art Perdomo, PT Physical Therapist                   Outcome Measures       Row Name 07/15/25 1443 07/15/25 0747       How much help from another person do you currently need...    Turning from your back to your side while in flat bed without using bedrails? 3  -AM 4  -SL    Moving from lying on back to sitting on the side of a flat bed without bedrails? 3  -AM 3  -SL    Moving to and from a bed to a chair (including a wheelchair)? 3  -AM 3  -SL    Standing up from a chair using your arms (e.g., wheelchair, bedside chair)? 3  -AM 3  -SL    Climbing 3-5 steps  with a railing? 1  -AM 3  -SL    To walk in hospital room? 1  -AM 3  -SL    AM-PAC 6 Clicks Score (PT) 14  -AM 19  -SL    Highest Level of Mobility Goal Move to Chair/Commode-4  -AM Walk 10 Steps or More-6  -SL      Row Name 07/15/25 1443          Functional Assessment    Outcome Measure Options AM-PAC 6 Clicks Basic Mobility (PT)  -AM               User Key  (r) = Recorded By, (t) = Taken By, (c) = Cosigned By      Initials Name Provider Type    AM Art Perdomo, PT Physical Therapist    Margaux Antonio, RN Registered Nurse                                 Physical Therapy Education       Title: PT OT SLP Therapies (Done)       Topic: Physical Therapy (Done)       Point: Mobility training (Done)       Learning Progress Summary            Patient Acceptance, E,TB, VU by AM at 7/15/2025 1444   Family Acceptance, E,TB, VU by AM at 7/15/2025 1444                      Point: Body mechanics (Done)       Learning Progress Summary            Patient Acceptance, E,TB, VU by AM at 7/15/2025 1444   Family Acceptance, E,TB, VU by AM at 7/15/2025 1444                      Point: Precautions (Done)       Learning Progress Summary            Patient Acceptance, E,TB, VU by AM at 7/15/2025 1444   Family Acceptance, E,TB, VU by AM at 7/15/2025 1444                                      User Key       Initials Effective Dates Name Provider Type Discipline    AM 05/10/21 -  Art Perdomo, PT Physical Therapist PT                  PT Recommendation and Plan  Planned Therapy Interventions (PT): balance training, bed mobility training, gait training, strengthening, stair training, patient/family education, transfer training  Outcome Evaluation: Pt is a 74 y/o female with c/o worsening back pain after fall on 6/17.  Has been to ED x 2 since fall with c/o back pain.  MRI L-spine: subacute T12 compression fx.  ELENA consult:   TLSO when OOB- if pain well controlled with TLSO wear for 6 weeks.  If pain not well controlled with use of TLSO  possible kyphoplasty.  Pt reports she has been living with her sister but plans to ultimately retun to living alone in a 1 story home with 4 steps to enter into home.  Educated pt on spinal precautions and application of TLSO.  Pt required Min A x 2 for supine to sit and sit to supine via logroll technique.  TLSO fitted to pt.  Sit to stand: Min A x 2 with RW.  Pt with c/o dizziness upon standing.  /94.  Pt unable to tolerate ambulation this date secondary to nausea and fatigue.  Pt reporting no pain with TLSO donned for sit to stand.  Recommend inpt rehab.     Time Calculation:         PT Charges       Row Name 07/15/25 1445             Time Calculation    Start Time 1305  -AM      Stop Time 1333  -AM      Time Calculation (min) 28 min  -AM      PT Received On 07/15/25  -AM      PT - Next Appointment 07/16/25  -AM      PT Goal Re-Cert Due Date 07/29/25  -AM                User Key  (r) = Recorded By, (t) = Taken By, (c) = Cosigned By      Initials Name Provider Type    Art Peace, PT Physical Therapist                  Therapy Charges for Today       Code Description Service Date Service Provider Modifiers Qty    [id number]  PT EVAL MOD COMPLEXITY 4 7/15/2025 Art Perdomo, PT GP 1            PT G-Codes  Outcome Measure Options: AM-PAC 6 Clicks Basic Mobility (PT)  AM-PAC 6 Clicks Score (PT): 14  PT Discharge Summary  Anticipated Discharge Disposition (PT): inpatient rehabilitation facility    Art Perdomo, RAKESH  7/15/2025

## 2025-07-15 NOTE — PROGRESS NOTES
[facility] MEDICINE SERVICE  DAILY PROGRESS NOTE    NAME: Yaneli Hernandez  : 1950  MRN: [id number]      LOS: 0 days     PROVIDER OF SERVICE: Radha Altman MD    Chief Complaint: Generalized weakness    Subjective:     Interval History:  History taken from: patient    Complaining of back pain    Review of Systems:   Review of Systems   All other systems reviewed and are negative.      Objective:     Vital Signs  Temp:  [97.8 °F (36.6 °C)-98.4 °F (36.9 °C)] 98.4 °F (36.9 °C)  Heart Rate:  [] 73  Resp:  [16-24] 17  BP: (109-146)/(55-97) 145/67   Body mass index is 22.1 kg/m².    Physical Exam  Physical Exam  Constitutional:       Appearance: Normal appearance.   HENT:      Head: Normocephalic and atraumatic.      Nose: Nose normal.      Mouth/Throat:      Mouth: Mucous membranes are moist.   Eyes:      Extraocular Movements: Extraocular movements intact.      Pupils: Pupils are equal, round, and reactive to light.   Cardiovascular:      Rate and Rhythm: Normal rate and regular rhythm.   Pulmonary:      Effort: Pulmonary effort is normal.      Breath sounds: Normal breath sounds.   Abdominal:      General: Abdomen is flat. Bowel sounds are normal.      Palpations: Abdomen is soft.   Musculoskeletal:         General: Normal range of motion.      Cervical back: Normal range of motion and neck supple.   Skin:     General: Skin is warm and dry.   Neurological:      General: No focal deficit present.      Mental Status: She is alert and oriented to person, place, and time.   Psychiatric:         Mood and Affect: Mood normal.         Behavior: Behavior normal.         Thought Content: Thought content normal.         Judgment: Judgment normal.         Current Medications:  Scheduled Meds:cefTRIAXone, 1,000 mg, Intravenous, Q24H      Continuous Infusions:   PRN Meds:.  acetaminophen **OR** acetaminophen **OR** acetaminophen    senna-docusate sodium **AND** polyethylene glycol **AND** bisacodyl  **AND** bisacodyl    Calcium Replacement - Follow Nurse / BPA Driven Protocol    Magnesium Standard Dose Replacement - Follow Nurse / BPA Driven Protocol    melatonin    ondansetron ODT **OR** ondansetron    Phosphorus Replacement - Follow Nurse / BPA Driven Protocol    Potassium Replacement - Follow Nurse / BPA Driven Protocol    [COMPLETED] Insert Peripheral IV **AND** sodium chloride       Diagnostic Data    Results from last 7 days   Lab Units 07/14/25  2252 07/14/25  1025   WBC 10*3/mm3 10.39 12.81*   HEMOGLOBIN g/dL 14.2 14.8   HEMATOCRIT % 43.9 46.1   PLATELETS 10*3/mm3 252 271   GLUCOSE mg/dL 168* 101*   CREATININE mg/dL 0.95 1.07*   BUN mg/dL 12.0 11.2   SODIUM mmol/L 140 142   POTASSIUM mmol/L 3.8 5.4*   AST (SGOT) U/L  --  16   ALT (SGPT) U/L  --  9   ALK PHOS U/L  --  120*   BILIRUBIN mg/dL  --  0.9   ANION GAP mmol/L 15.9* 21.2*       MRI Lumbar Spine Without Contrast  Result Date: 7/14/2025  Impression: Subacute compression fracture at T12 with interval mildly increased height loss measuring approximately 50%, previously up to 40%. Mild osseous retropulsion at T12 vertebral body level with up to mild spinal canal stenosis. Multilevel degenerative changes of the lumbar spine without high-grade spinal canal or foraminal stenosis. Electronically Signed: Dimitri Adler MD  7/14/2025 5:02 PM EDT  Workstation ID: [id number]        I reviewed the patient's new clinical results.    Assessment/Plan:     Active and Resolved Problems  Active Hospital Problems    Diagnosis  POA    **Generalized weakness [R53.1]  Yes      Resolved Hospital Problems   No resolved problems to display.       Urinary tract infection  - Continue ceftriaxone and follow-up on urine culture results.    Generalized weakness  Subacute T12 compression fracture s/p fall 6/21/2025  MRI L-spine: Subacute T12 compression fracture with interval height loss of 50% and mild spinal canal stenosis  -Neurosurgery consulted for compression fracture.   Continue as needed pain medications.  May benefit from TLSO brace.     Postprandial pain and nausea  Anorexia  -Follow-up on CT scan of the abdomen pelvis.  Continue as needed antiemetics.    Hypertension  -Resume home blood pressure medications.  Blood pressure currently with fair control.      VTE Prophylaxis:  Mechanical VTE prophylaxis orders are present.        Disposition Planning:     Barriers to Discharge: Pending clinical improvement  Anticipated Date of Discharge: 7/19/2025  Place of Discharge: Home        Code Status and Medical Interventions: CPR (Attempt to Resuscitate); Full Support   Ordered at: 07/14/25 1907     Code Status (Patient has no pulse and is not breathing):    CPR (Attempt to Resuscitate)     Medical Interventions (Patient has pulse or is breathing):    Full Support       Signature: Electronically signed by Radha Altman MD, 07/15/25, 09:19 EDT.  [facility] Hospitalist Team

## 2025-07-16 LAB
ANION GAP SERPL CALCULATED.3IONS-SCNC: 17.2 MMOL/L (ref 5–15)
BACTERIA SPEC AEROBE CULT: ABNORMAL
BASOPHILS # BLD AUTO: 0.04 10*3/MM3 (ref 0–0.2)
BASOPHILS NFR BLD AUTO: 0.2 % (ref 0–1.5)
BUN SERPL-MCNC: 5.8 MG/DL (ref 8–23)
BUN/CREAT SERPL: 7.6 (ref 7–25)
CALCIUM SPEC-SCNC: 9.6 MG/DL (ref 8.6–10.5)
CHLORIDE SERPL-SCNC: 99 MMOL/L (ref 98–107)
CO2 SERPL-SCNC: 21.8 MMOL/L (ref 22–29)
CREAT SERPL-MCNC: 0.76 MG/DL (ref 0.57–1)
DEPRECATED RDW RBC AUTO: 47 FL (ref 37–54)
EGFRCR SERPLBLD CKD-EPI 2021: 82.3 ML/MIN/1.73
EOSINOPHIL # BLD AUTO: 0.14 10*3/MM3 (ref 0–0.4)
EOSINOPHIL NFR BLD AUTO: 0.8 % (ref 0.3–6.2)
ERYTHROCYTE [DISTWIDTH] IN BLOOD BY AUTOMATED COUNT: 13.3 % (ref 12.3–15.4)
GLUCOSE SERPL-MCNC: 121 MG/DL (ref 65–99)
HCT VFR BLD AUTO: 43.9 % (ref 34–46.6)
HGB BLD-MCNC: 14.3 G/DL (ref 12–15.9)
IMM GRANULOCYTES # BLD AUTO: 0.08 10*3/MM3 (ref 0–0.05)
IMM GRANULOCYTES NFR BLD AUTO: 0.5 % (ref 0–0.5)
LYMPHOCYTES # BLD AUTO: 0.46 10*3/MM3 (ref 0.7–3.1)
LYMPHOCYTES NFR BLD AUTO: 2.6 % (ref 19.6–45.3)
MCH RBC QN AUTO: 31 PG (ref 26.6–33)
MCHC RBC AUTO-ENTMCNC: 32.6 G/DL (ref 31.5–35.7)
MCV RBC AUTO: 95.2 FL (ref 79–97)
MONOCYTES # BLD AUTO: 0.89 10*3/MM3 (ref 0.1–0.9)
MONOCYTES NFR BLD AUTO: 5 % (ref 5–12)
NEUTROPHILS NFR BLD AUTO: 16.07 10*3/MM3 (ref 1.7–7)
NEUTROPHILS NFR BLD AUTO: 90.9 % (ref 42.7–76)
NRBC BLD AUTO-RTO: 0 /100 WBC (ref 0–0.2)
PLATELET # BLD AUTO: 245 10*3/MM3 (ref 140–450)
PMV BLD AUTO: 11 FL (ref 6–12)
POTASSIUM SERPL-SCNC: 4.1 MMOL/L (ref 3.5–5.2)
RBC # BLD AUTO: 4.61 10*6/MM3 (ref 3.77–5.28)
SODIUM SERPL-SCNC: 138 MMOL/L (ref 136–145)
WBC NRBC COR # BLD AUTO: 17.68 10*3/MM3 (ref 3.4–10.8)

## 2025-07-16 PROCEDURE — 99231 SBSQ HOSP IP/OBS SF/LOW 25: CPT | Performed by: NURSE PRACTITIONER

## 2025-07-16 PROCEDURE — G0378 HOSPITAL OBSERVATION PER HR: HCPCS

## 2025-07-16 PROCEDURE — 85025 COMPLETE CBC W/AUTO DIFF WBC: CPT

## 2025-07-16 PROCEDURE — 97535 SELF CARE MNGMENT TRAINING: CPT

## 2025-07-16 PROCEDURE — 97530 THERAPEUTIC ACTIVITIES: CPT

## 2025-07-16 PROCEDURE — 80048 BASIC METABOLIC PNL TOTAL CA: CPT

## 2025-07-16 RX ORDER — SULFAMETHOXAZOLE AND TRIMETHOPRIM 800; 160 MG/1; MG/1
1 TABLET ORAL EVERY 12 HOURS SCHEDULED
Status: DISCONTINUED | OUTPATIENT
Start: 2025-07-16 | End: 2025-07-16

## 2025-07-16 RX ADMIN — ACETAMINOPHEN 650 MG: 325 TABLET, FILM COATED ORAL at 21:07

## 2025-07-16 RX ADMIN — HYDRALAZINE HYDROCHLORIDE 25 MG: 25 TABLET ORAL at 08:32

## 2025-07-16 RX ADMIN — ACETAMINOPHEN 650 MG: 325 TABLET, FILM COATED ORAL at 11:51

## 2025-07-16 RX ADMIN — AMLODIPINE BESYLATE 10 MG: 5 TABLET ORAL at 08:32

## 2025-07-16 RX ADMIN — AMOXICILLIN AND CLAVULANATE POTASSIUM 1 TABLET: 875; 125 TABLET, COATED ORAL at 21:00

## 2025-07-16 RX ADMIN — AMOXICILLIN AND CLAVULANATE POTASSIUM 1 TABLET: 875; 125 TABLET, COATED ORAL at 11:51

## 2025-07-16 NOTE — PLAN OF CARE
Goal Outcome Evaluation:  Plan of Care Reviewed With: patient        Progress: no change  Outcome Evaluation: Pt VSS, Pt is to have TLSO brace out of bed, Pt is alert and oriented, Call light in reach, Plan on going

## 2025-07-16 NOTE — PLAN OF CARE
Assessment: Yaneli Hernandez presents with ADL impairments affecting function including balance, endurance / activity tolerance, pain, and strength. Pt requiring Min A for bed mobility and transfers with RW. Once on feet, able to ambulate with CGA albeit short distances. Stood sink side for completion of oral care regimen. Prior to OOB activity, pt requiring Max A for donning of TLSE, though receptive to education regarding donning strategies. Demonstrated functioning below baseline abilities indicate the need for continued skilled intervention while inpatient. Tolerating session today without incident. Will continue to follow and progress as tolerated.      Plan/Recommendations:   High Intensity Therapy recommended post-acute care. This is recommended as therapy feels the patient would require 5-6 days per week, 2-3 hours per day. At this time, inpatient rehabilitation (acute rehab) would be the first choice and SNF would be second.. Pt requires no DME at discharge.      Pt desires Inpatient Rehabilitation placement at discharge. Pt cooperative; agreeable to therapeutic recommendations and plan of care.

## 2025-07-16 NOTE — PROGRESS NOTES
NEUROSURGERY PROGRESS NOTE     LOS: 0 days   Patient Care Team:  Mina Padilla PA as PCP - General (Family Medicine)  Mina Padilla PA as PCP - Family Medicine  Maycol Almeida MD as Consulting Physician (Pain Medicine)  Haris Holley MD as Consulting Physician (Nephrology)    Chief Complaint:    Chief Complaint   Patient presents with    Back Pain        Subjective     Interval History: NAEO.  Patient was fitted with TLSO brace and reports that she is walking well with it.  She reports that it does help with her pain.        History taken from: patient chart    Objective      Vital Signs  Temp:  [98.3 °F (36.8 °C)-99.8 °F (37.7 °C)] 98.3 °F (36.8 °C)  Heart Rate:  [] 114  Resp:  [18-19] 18  BP: (128-161)/(50-80) 137/50  Body mass index is 22.1 kg/m².    Intake/Output last 3 shifts:  I/O last 3 completed shifts:  In: 240 [P.O.:240]  Out: 2100 [Urine:1500; Emesis/NG output:600]    Intake/Output this shift:  I/O this shift:  In: 240 [P.O.:240]  Out: -     Physical    GENERAL: No acute distress. Appears well nourished. Appears stated age.    NEURO: AAOx3.  Good symmetric strength in lower extremities with no focal motor deficits  Sensation intact to light touch. Reflexes 2+ throughout.    Musculoskeletal: Mild tenderness to palpation midline thoracolumbar region    Results Review:  I reviewed the patient's new clinical results.    Labs:    Lab Results (last 24 hours)       Procedure Component Value Units Date/Time    Basic Metabolic Panel [[id number]]  (Abnormal) Collected: 07/16/25 0001    Specimen: Blood from Arm, Right Updated: 07/16/25 0030     Glucose 121 mg/dL      BUN 5.8 mg/dL      Creatinine 0.76 mg/dL      Sodium 138 mmol/L      Potassium 4.1 mmol/L      Chloride 99 mmol/L      CO2 21.8 mmol/L      Calcium 9.6 mg/dL      BUN/Creatinine Ratio 7.6     Anion Gap 17.2 mmol/L      eGFR 82.3 mL/min/1.73     Narrative:      GFR Categories in Chronic Kidney Disease (CKD)              GFR  Category          GFR (mL/min/1.73)    Interpretation  G1                    90 or greater        Normal or high (1)  G2                    60-89                Mild decrease (1)  G3a                   45-59                Mild to moderate decrease  G3b                   30-44                Moderate to severe decrease  G4                    15-29                Severe decrease  G5                    14 or less           Kidney failure    (1)In the absence of evidence of kidney disease, neither GFR category G1 or G2 fulfill the criteria for CKD.    eGFR calculation 2021 CKD-EPI creatinine equation, which does not include race as a factor    CBC & Differential [[id number]]  (Abnormal) Collected: 07/16/25 0001    Specimen: Blood from Arm, Right Updated: 07/16/25 0009    Narrative:      The following orders were created for panel order CBC & Differential.  Procedure                               Abnormality         Status                     ---------                               -----------         ------                     CBC Auto Differential[[id number]]        Abnormal            Final result                 Please view results for these tests on the individual orders.    CBC Auto Differential [[id number]]  (Abnormal) Collected: 07/16/25 0001    Specimen: Blood from Arm, Right Updated: 07/16/25 0009     WBC 17.68 10*3/mm3      RBC 4.61 10*6/mm3      Hemoglobin 14.3 g/dL      Hematocrit 43.9 %      MCV 95.2 fL      MCH 31.0 pg      MCHC 32.6 g/dL      RDW 13.3 %      RDW-SD 47.0 fl      MPV 11.0 fL      Platelets 245 10*3/mm3      Neutrophil % 90.9 %      Lymphocyte % 2.6 %      Monocyte % 5.0 %      Eosinophil % 0.8 %      Basophil % 0.2 %      Immature Grans % 0.5 %      Neutrophils, Absolute 16.07 10*3/mm3      Lymphocytes, Absolute 0.46 10*3/mm3      Monocytes, Absolute 0.89 10*3/mm3      Eosinophils, Absolute 0.14 10*3/mm3      Basophils, Absolute 0.04 10*3/mm3      Immature Grans, Absolute 0.08 10*3/mm3       nRBC 0.0 /100 WBC             Imaging:    No new neuroimaging.    Current Medications:   Scheduled Meds:amLODIPine, 10 mg, Oral, Q24H  cefTRIAXone, 1,000 mg, Intravenous, Q24H  hydrALAZINE, 25 mg, Oral, TID      Continuous Infusions:     Assessment & Plan       Generalized weakness      Assessment/Plan:  Yaneli Hernandez is a 75 y.o. female followed for subacute T12 compression deformity.  She seems to be tolerating the TLSO brace.  Will continue with medical management at this time with pain control and bracing the patient should wear for anytime she is out of bed for the next 6 weeks.  We discussed the importance of being compliant with her bracing for best chance of healing response.      I informed patient that typically vertebral compression fractures heal in approximately 12 weeks.  Patients with osteopenia/osteoporosis may take somewhat longer due to underlying bone density issues.  Pain typically subsides on the average of 4 to 6 weeks with a range of 2 to 12 weeks.  Stable vertebral compression fractures are treated initially with analgesics, muscle relaxers and restricting activity with progressive mobilization.  It is recommended that patient limit lifting, pushing, pulling of no more than 5 pounds, with no no bending or twisting.  No exertional impact activity-walking is okay.  Physical therapy will add value to patient's recovery and strongly encouraged.  A TLSO brace has been ordered due to diagnosis of T12 compression fracture.  The purpose of the brace is to support weak muscles, stabilize and restrict movement of the trunk to aid in healing and pain relief of fracture.Wear brace when sitting higher than 45 degrees, standing, walking, and when riding in a car.  You do not have to wear the brace in bed.  I am recommending serial imaging in approximately 6 weeks to rule out progressive deformity.      T12 fracture    - TLSO brace out of bed  - Okay for discharge per the neurosurgical standpoint  -  Medical management and pain management per primary  - Follow-up in clinic in 6 weeks with serial imaging        Assessment findings were discussed with Dr. Mercado who agrees with plan of care.    KENYON Candelario  07/16/25  08:47 EDT

## 2025-07-16 NOTE — THERAPY TREATMENT NOTE
Subjective: Pt agreeable to therapeutic plan of care.  Cognition: oriented to Person, Place, Time, and Situation    Objective:     Precautions - TLSO    Bed Mobility: Min-A via logroll technique  Functional Transfers: Min-A and with rolling walker     Balance: supported, static, dynamic, and standing CGA and with rolling walker  Functional Ambulation: CGA and with rolling walker ambulating 20 feet x2    Upper Body Dressing: Max-A  ADL Position: edge of bed sitting  ADL Comments: Max A required for donning TLSO, but pt receptive of education provided during donning activity.    Grooming: Min-A  ADL Position: supported standing and at sink  ADL Comments: Min A provided at times during oral care regimen secondary to pain when reaching for items such as toothbrush and paper towels.    Vitals: WNL    Pain: 7 VAS Location: back  Interventions for pain: Repositioned, RN notified, and Therapeutic Presence  Education: Provided education on the importance of mobility in the acute care setting, Verbal/Tactile Cues, ADL training, and Transfer Training    Assessment: Yaneli Hernandez presents with ADL impairments affecting function including balance, endurance / activity tolerance, pain, and strength. Pt requiring Min A for bed mobility and transfers with RW. Once on feet, able to ambulate with CGA albeit short distances. Stood sink side for completion of oral care regimen. Prior to OOB activity, pt requiring Max A for donning of TLSE, though receptive to education regarding donning strategies. Demonstrated functioning below baseline abilities indicate the need for continued skilled intervention while inpatient. Tolerating session today without incident. Will continue to follow and progress as tolerated.     Plan/Recommendations:   High Intensity Therapy recommended post-acute care. This is recommended as therapy feels the patient would require 5-6 days per week, 2-3 hours per day. At this time, inpatient rehabilitation (acute  rehab) would be the first choice and SNF would be second.. Pt requires no DME at discharge.     Pt desires Inpatient Rehabilitation placement at discharge. Pt cooperative; agreeable to therapeutic recommendations and plan of care.     Modified [place]: N/A = No pre-op stroke/TIA    Post-Tx Position: Up in Chair, Alarms activated, and Call light and personal items within reach  PPE: gloves    Therapy Charges for Today       Code Description Service Date Service Provider Modifiers Qty    [id number]  OT THERAPEUTIC ACT EA 15 MIN 7/16/2025 Yoshi Real OT GO 1    [id number] HC OT SELF CARE/MGMT/TRAIN EA 15 MIN 7/16/2025 Yoshi Real OT GO 1           Time Calculation- OT       Row Name 07/16/25 1641             Time Calculation- OT    OT Start Time 1120  -LS      OT Stop Time 1149  -LS      OT Time Calculation (min) 29 min  -LS      Total Timed Code Minutes- OT 29 minute(s)  -LS      OT Received On 07/16/25  -LS      OT - Next Appointment 07/17/25  -         Timed Charges    79823 - OT Therapeutic Activity Minutes 12  -LS      05163 - OT Self Care/Mgmt Minutes 17  -LS         Total Minutes    Timed Charges Total Minutes 29  -LS       Total Minutes 29  -LS                User Key  (r) = Recorded By, (t) = Taken By, (c) = Cosigned By      Initials Name Provider Type    Yoshi Flor OT Occupational Therapist

## 2025-07-16 NOTE — PLAN OF CARE
Goal Outcome Evaluation:              Outcome Evaluation: pt has been getting up to chair today and working with PT, had a little increased pain with PT. She has been switched to PO abx and will discharge tomorrow to [facility] as long as WBC has improved.

## 2025-07-16 NOTE — CASE MANAGEMENT/SOCIAL WORK
Continued Stay Note  DENNISE Do     Patient Name: Yaneli Hernandez  MRN: [id number]  Today's Date: 7/16/2025    Admit Date: 7/14/2025    Plan: [facility], accepted. No PASRR or precert required.   Discharge Plan       Row Name 07/16/25 1018       Plan    Plan [facility], accepted. No PASRR or precert required.    Plan Comments CM confirmed with hospitals liaisons Aleshia/Li that patient has a bed ready at [facility] today if patient is medically ready to DC.           Kevin Abebe RN     Cell number [phone]  Office number [phone]

## 2025-07-16 NOTE — PROGRESS NOTES
[facility] MEDICINE SERVICE  DAILY PROGRESS NOTE    NAME: Yaneli Hernandez  : 1950  MRN: [id number]      LOS: 0 days     PROVIDER OF SERVICE: Radha Altman MD    Chief Complaint: Generalized weakness    Subjective:     Interval History:  History taken from: patient    Complaining of back pain    Review of Systems:   Review of Systems   All other systems reviewed and are negative.      Objective:     Vital Signs  Temp:  [98.3 °F (36.8 °C)-99.8 °F (37.7 °C)] 98.3 °F (36.8 °C)  Heart Rate:  [] 114  Resp:  [18-19] 18  BP: (128-161)/(50-80) 137/50   Body mass index is 22.1 kg/m².    Physical Exam  Physical Exam  Constitutional:       Appearance: Normal appearance.   HENT:      Head: Normocephalic and atraumatic.      Nose: Nose normal.      Mouth/Throat:      Mouth: Mucous membranes are moist.   Eyes:      Extraocular Movements: Extraocular movements intact.      Pupils: Pupils are equal, round, and reactive to light.   Cardiovascular:      Rate and Rhythm: Normal rate and regular rhythm.   Pulmonary:      Effort: Pulmonary effort is normal.      Breath sounds: Normal breath sounds.   Abdominal:      General: Abdomen is flat. Bowel sounds are normal.      Palpations: Abdomen is soft.   Musculoskeletal:         General: Normal range of motion.      Cervical back: Normal range of motion and neck supple.   Skin:     General: Skin is warm and dry.   Neurological:      General: No focal deficit present.      Mental Status: She is alert and oriented to person, place, and time.   Psychiatric:         Mood and Affect: Mood normal.         Behavior: Behavior normal.         Thought Content: Thought content normal.         Judgment: Judgment normal.         Current Medications:  Scheduled Meds:amLODIPine, 10 mg, Oral, Q24H  cefTRIAXone, 1,000 mg, Intravenous, Q24H  hydrALAZINE, 25 mg, Oral, TID      Continuous Infusions:   PRN Meds:.  acetaminophen **OR** acetaminophen **OR** acetaminophen     ALPRAZolam    senna-docusate sodium **AND** polyethylene glycol **AND** bisacodyl **AND** bisacodyl    Calcium Replacement - Follow Nurse / BPA Driven Protocol    Magnesium Standard Dose Replacement - Follow Nurse / BPA Driven Protocol    melatonin    ondansetron ODT **OR** ondansetron    Phosphorus Replacement - Follow Nurse / BPA Driven Protocol    Potassium Replacement - Follow Nurse / BPA Driven Protocol    prochlorperazine    [COMPLETED] Insert Peripheral IV **AND** sodium chloride       Diagnostic Data    Results from last 7 days   Lab Units 07/16/25  0001 07/14/25  2252 07/14/25  1025   WBC 10*3/mm3 17.68*   < > 12.81*   HEMOGLOBIN g/dL 14.3   < > 14.8   HEMATOCRIT % 43.9   < > 46.1   PLATELETS 10*3/mm3 245   < > 271   GLUCOSE mg/dL 121*   < > 101*   CREATININE mg/dL 0.76   < > 1.07*   BUN mg/dL 5.8*   < > 11.2   SODIUM mmol/L 138   < > 142   POTASSIUM mmol/L 4.1   < > 5.4*   AST (SGOT) U/L  --   --  16   ALT (SGPT) U/L  --   --  9   ALK PHOS U/L  --   --  120*   BILIRUBIN mg/dL  --   --  0.9   ANION GAP mmol/L 17.2*   < > 21.2*    < > = values in this interval not displayed.       CT Abdomen Pelvis With Contrast  Result Date: 7/15/2025  Impression: 1. No acute findings in the abdomen or pelvis. 2. Subacute compression injury at T12. Electronically Signed: Marycarmen Milton MD  7/15/2025 9:53 AM EDT  Workstation ID: [id number]    MRI Lumbar Spine Without Contrast  Result Date: 7/14/2025  Impression: Subacute compression fracture at T12 with interval mildly increased height loss measuring approximately 50%, previously up to 40%. Mild osseous retropulsion at T12 vertebral body level with up to mild spinal canal stenosis. Multilevel degenerative changes of the lumbar spine without high-grade spinal canal or foraminal stenosis. Electronically Signed: Dimitri Adler MD  7/14/2025 5:02 PM EDT  Workstation ID: [id number]        I reviewed the patient's new clinical results.    Assessment/Plan:     Active and Resolved  Problems  Active Hospital Problems    Diagnosis  POA    **Generalized weakness [R53.1]  Yes      Resolved Hospital Problems   No resolved problems to display.       Generalized weakness  Subacute T12 compression fracture s/p fall 6/21/2025  MRI L-spine: Subacute T12 compression fracture with interval height loss of 50% and mild spinal canal stenosis  -Neurosurgery consulted for compression fracture and recommended TLSO brace.  Continue as needed pain medications.   PT and OT recommending inpatient rehab.    Enterococcus faecalis UTI  - WBC is trending upwards.  Start Augmentin.      Postprandial pain and nausea  Anorexia  - CT scan of the abdomen pelvis is negative for an acute process.  Nausea is currently resolved.      Hypertension  - BP is controlled.  Continue current BP medication regimen.          VTE Prophylaxis:  Mechanical VTE prophylaxis orders are present.        Disposition Planning:     Barriers to Discharge: Pending clinical improvement  Anticipated Date of Discharge: 7/19/2025  Place of Discharge: Rehab        Code Status and Medical Interventions: CPR (Attempt to Resuscitate); Full Support   Ordered at: 07/14/25 1907     Code Status (Patient has no pulse and is not breathing):    CPR (Attempt to Resuscitate)     Medical Interventions (Patient has pulse or is breathing):    Full Support       Signature: Electronically signed by Radha Altman MD, 07/16/25, 10:33 EDT.  [facility] Hospitalist Team

## 2025-07-16 NOTE — SIGNIFICANT NOTE
07/16/25 1514   OTHER   Discipline physical therapist   Rehab Time/Intention   Session Not Performed other (see comments)  (RN had just helped pt back to bed from sitting in the chair following OT session)   Therapy Assessment/Plan (PT)   Criteria for Skilled Interventions Met (PT) yes;skilled treatment is necessary   Recommendation   PT - Next Appointment 07/17/25

## 2025-07-17 VITALS
DIASTOLIC BLOOD PRESSURE: 67 MMHG | HEIGHT: 64 IN | RESPIRATION RATE: 15 BRPM | SYSTOLIC BLOOD PRESSURE: 122 MMHG | BODY MASS INDEX: 21.98 KG/M2 | HEART RATE: 90 BPM | TEMPERATURE: 98.2 F | WEIGHT: 128.75 LBS | OXYGEN SATURATION: 92 %

## 2025-07-17 LAB
ANION GAP SERPL CALCULATED.3IONS-SCNC: 14.1 MMOL/L (ref 5–15)
BASOPHILS # BLD AUTO: 0.05 10*3/MM3 (ref 0–0.2)
BASOPHILS NFR BLD AUTO: 0.8 % (ref 0–1.5)
BUN SERPL-MCNC: 7 MG/DL (ref 8–23)
BUN/CREAT SERPL: 10.8 (ref 7–25)
CALCIUM SPEC-SCNC: 9.6 MG/DL (ref 8.6–10.5)
CHLORIDE SERPL-SCNC: 99 MMOL/L (ref 98–107)
CO2 SERPL-SCNC: 23.9 MMOL/L (ref 22–29)
CREAT SERPL-MCNC: 0.65 MG/DL (ref 0.57–1)
DEPRECATED RDW RBC AUTO: 45.8 FL (ref 37–54)
EGFRCR SERPLBLD CKD-EPI 2021: 92.5 ML/MIN/1.73
EOSINOPHIL # BLD AUTO: 0.29 10*3/MM3 (ref 0–0.4)
EOSINOPHIL NFR BLD AUTO: 4.7 % (ref 0.3–6.2)
ERYTHROCYTE [DISTWIDTH] IN BLOOD BY AUTOMATED COUNT: 13.3 % (ref 12.3–15.4)
GLUCOSE SERPL-MCNC: 108 MG/DL (ref 65–99)
HCT VFR BLD AUTO: 42.7 % (ref 34–46.6)
HGB BLD-MCNC: 14.1 G/DL (ref 12–15.9)
IMM GRANULOCYTES # BLD AUTO: 0.05 10*3/MM3 (ref 0–0.05)
IMM GRANULOCYTES NFR BLD AUTO: 0.8 % (ref 0–0.5)
LYMPHOCYTES # BLD AUTO: 0.64 10*3/MM3 (ref 0.7–3.1)
LYMPHOCYTES NFR BLD AUTO: 10.3 % (ref 19.6–45.3)
MCH RBC QN AUTO: 31.1 PG (ref 26.6–33)
MCHC RBC AUTO-ENTMCNC: 33 G/DL (ref 31.5–35.7)
MCV RBC AUTO: 94.3 FL (ref 79–97)
MONOCYTES # BLD AUTO: 0.39 10*3/MM3 (ref 0.1–0.9)
MONOCYTES NFR BLD AUTO: 6.3 % (ref 5–12)
NEUTROPHILS NFR BLD AUTO: 4.78 10*3/MM3 (ref 1.7–7)
NEUTROPHILS NFR BLD AUTO: 77.1 % (ref 42.7–76)
NRBC BLD AUTO-RTO: 0 /100 WBC (ref 0–0.2)
PLATELET # BLD AUTO: 206 10*3/MM3 (ref 140–450)
PMV BLD AUTO: 11.8 FL (ref 6–12)
POTASSIUM SERPL-SCNC: 3.4 MMOL/L (ref 3.5–5.2)
RBC # BLD AUTO: 4.53 10*6/MM3 (ref 3.77–5.28)
SODIUM SERPL-SCNC: 137 MMOL/L (ref 136–145)
WBC NRBC COR # BLD AUTO: 6.2 10*3/MM3 (ref 3.4–10.8)

## 2025-07-17 PROCEDURE — G0378 HOSPITAL OBSERVATION PER HR: HCPCS

## 2025-07-17 PROCEDURE — 80048 BASIC METABOLIC PNL TOTAL CA: CPT

## 2025-07-17 PROCEDURE — 85025 COMPLETE CBC W/AUTO DIFF WBC: CPT

## 2025-07-17 RX ORDER — HYDROCODONE BITARTRATE AND ACETAMINOPHEN 5; 325 MG/1; MG/1
1 TABLET ORAL EVERY 6 HOURS PRN
Qty: 12 TABLET | Refills: 0 | Status: SHIPPED | OUTPATIENT
Start: 2025-07-17 | End: 2025-07-20

## 2025-07-17 RX ORDER — POTASSIUM CHLORIDE 1500 MG/1
40 TABLET, EXTENDED RELEASE ORAL EVERY 4 HOURS
Status: COMPLETED | OUTPATIENT
Start: 2025-07-17 | End: 2025-07-17

## 2025-07-17 RX ADMIN — HYDRALAZINE HYDROCHLORIDE 25 MG: 25 TABLET ORAL at 08:46

## 2025-07-17 RX ADMIN — AMLODIPINE BESYLATE 10 MG: 5 TABLET ORAL at 08:46

## 2025-07-17 RX ADMIN — POTASSIUM CHLORIDE 40 MEQ: 1500 TABLET, EXTENDED RELEASE ORAL at 08:46

## 2025-07-17 RX ADMIN — POTASSIUM CHLORIDE 40 MEQ: 1500 TABLET, EXTENDED RELEASE ORAL at 05:13

## 2025-07-17 RX ADMIN — AMOXICILLIN AND CLAVULANATE POTASSIUM 1 TABLET: 875; 125 TABLET, COATED ORAL at 08:46

## 2025-07-17 NOTE — DISCHARGE INSTR - ACTIVITY
Limit lifting, pushing, pulling of no more than 5 pounds, with no no bending or twisting. No exertional impact activity-walking is okay. TLSO brace when out of bed. Do not have to wear the brace in bed.

## 2025-07-17 NOTE — SIGNIFICANT NOTE
07/17/25 1139   OTHER   Discipline physical therapist   Rehab Time/Intention   Session Not Performed other (see comments)  (pt is discharging to acute rehab today)

## 2025-07-17 NOTE — DISCHARGE SUMMARY
[facility] Medicine Services  Discharge Summary    Date of Service: 2025  Patient Name: Yaneli Hernandez  : 1950  MRN: [id number]    Date of Admission: 2025  Discharge Diagnosis:     Generalized weakness  T12 compression fracture status post fall  Enterococcus faecalis UTI  Hypertension    Date of Discharge: 2025  Primary Care Physician: Mina Padilla PA      Hospital Course       Hospital Course:    This patient is a 75-year-old lady who presented with back pain and generalized weakness.  She sustained a T12 compression fracture status post a fall on 2025. She was evaluated at this facility. CT head, cervical spine, pelvis with no noted acute findings.  She was seen by neurosurgery who recommended TLSO brace.  Patient has this in place at this time.  As needed pain medications were ordered for pain control.  She was seen by PT and OT for generalized weakness and recommendation was for inpatient rehab.  She is being discharged to inpatient rehab today.    She was also treated for UTI and urine culture grew Enterococcus faecalis.  She was started on Augmentin.  She is being discharged on Augmentin for complete treatment of the urinary tract infection.      DISCHARGE Follow Up Recommendations:-Follow-up PCP in 1 week, follow-up neurosurgery in 2 weeks.      Day of Discharge     Vital Signs:  Temp:  [98.2 °F (36.8 °C)-98.9 °F (37.2 °C)] 98.2 °F (36.8 °C)  Heart Rate:  [] 90  Resp:  [15-16] 15  BP: ()/(58-67) 122/67    Physical Exam:  Physical Exam  Constitutional:       Appearance: Normal appearance.   HENT:      Head: Normocephalic and atraumatic.      Nose: Nose normal.      Mouth/Throat:      Mouth: Mucous membranes are moist.   Eyes:      Extraocular Movements: Extraocular movements intact.      Pupils: Pupils are equal, round, and reactive to light.   Cardiovascular:      Rate and Rhythm: Normal rate and regular rhythm.   Pulmonary:      Effort:  Pulmonary effort is normal.      Breath sounds: Normal breath sounds.   Abdominal:      General: Abdomen is flat. Bowel sounds are normal.      Palpations: Abdomen is soft.   Musculoskeletal:      Cervical back: Normal range of motion and neck supple.   Skin:     General: Skin is warm and dry.   Neurological:      General: No focal deficit present.      Mental Status: She is alert and oriented to person, place, and time.   Psychiatric:         Mood and Affect: Mood normal.         Behavior: Behavior normal.         Thought Content: Thought content normal.         Judgment: Judgment normal.            Pertinent  and/or Most Recent Results     LAB RESULTS:      Lab 07/17/25  0303 07/16/25  0001 07/14/25 2252 07/14/25  1025   WBC 6.20 17.68* 10.39 12.81*   HEMOGLOBIN 14.1 14.3 14.2 14.8   HEMATOCRIT 42.7 43.9 43.9 46.1   PLATELETS 206 245 252 271   NEUTROS ABS 4.78 16.07* 7.65* 9.99*   IMMATURE GRANS (ABS) 0.05 0.08* 0.05 0.05   LYMPHS ABS 0.64* 0.46* 1.52 1.66   MONOS ABS 0.39 0.89 0.79 0.77   EOS ABS 0.29 0.14 0.32 0.28   MCV 94.3 95.2 96.3 96.2   SED RATE  --   --   --  16         Lab 07/17/25  0303 07/16/25  0001 07/14/25 2252 07/14/25  1025   SODIUM 137 138 140 142   POTASSIUM 3.4* 4.1 3.8 5.4*   CHLORIDE 99 99 102 100   CO2 23.9 21.8* 22.1 20.8*   ANION GAP 14.1 17.2* 15.9* 21.2*   BUN 7.0* 5.8* 12.0 11.2   CREATININE 0.65 0.76 0.95 1.07*   EGFR 92.5 82.3 63.0 54.6*   GLUCOSE 108* 121* 168* 101*   CALCIUM 9.6 9.6 9.6 10.5         Lab 07/14/25  1025   TOTAL PROTEIN 7.0   ALBUMIN 4.1   GLOBULIN 2.9   ALT (SGPT) 9   AST (SGOT) 16   BILIRUBIN 0.9   ALK PHOS 120*   LIPASE 19                     Brief Urine Lab Results  (Last result in the past 365 days)        Color   Clarity   Blood   Leuk Est   Nitrite   Protein   CREAT   Urine HCG        07/14/25 1118 Yellow   Cloudy   Small (1+)   Moderate (2+)   Negative   30 mg/dL (1+)                 Microbiology Results (last 10 days)       Procedure Component Value -  Date/Time    Urine Culture - Urine, Urine, Clean Catch [[id number]]  (Abnormal)  (Susceptibility) Collected: 07/14/25 1118    Lab Status: Final result Specimen: Urine, Clean Catch Updated: 07/16/25 1036     Urine Culture >100,000 CFU/mL Enterococcus faecalis    Susceptibility        Enterococcus faecalis      DOMENIC      Ampicillin Susceptible      Levofloxacin Susceptible      Nitrofurantoin Susceptible      Vancomycin Susceptible                                   CT Abdomen Pelvis With Contrast  Result Date: 7/15/2025  Impression: Impression: 1. No acute findings in the abdomen or pelvis. 2. Subacute compression injury at T12. Electronically Signed: Marycarmen Milton MD  7/15/2025 9:53 AM EDT  Workstation ID: [id number]    MRI Lumbar Spine Without Contrast  Result Date: 7/14/2025  Impression: Impression: Subacute compression fracture at T12 with interval mildly increased height loss measuring approximately 50%, previously up to 40%. Mild osseous retropulsion at T12 vertebral body level with up to mild spinal canal stenosis. Multilevel degenerative changes of the lumbar spine without high-grade spinal canal or foraminal stenosis. Electronically Signed: Dimitri Adler MD  7/14/2025 5:02 PM EDT  Workstation ID: [id number]    CT Angiogram Chest Pulmonary Embolism  Result Date: 6/27/2025  Impression: No pulmonary embolus. No acute cardiopulmonary disease. Electronically Signed: Romeo Zamorano MD  6/27/2025 6:51 PM EDT  Workstation ID: [id number]    XR Chest 1 View  Result Date: 6/27/2025  Impression: Impression: No acute abnormality Electronically Signed: James Ty MD  6/27/2025 6:07 PM EDT  Workstation ID: [id number]    CT Pelvis Without Contrast  Result Date: 6/21/2025  Impression: Impression: 1.No acute osseous abnormality. 2.Mild bilateral hip arthritis. 3.Mild right SI joint arthritis. 4.Partially visualized lumbar spondylosis. Electronically Signed: Shai Andrews MD  6/21/2025 8:24 PM EDT  Workstation ID: [id number]    XR  Pelvis 1 or 2 View  Result Date: 6/21/2025  Impression: Impression: 1.No evidence of acute fracture. 2.Mild bilateral hip arthritis. Electronically Signed: Shai Andrews MD  6/21/2025 5:08 PM EDT  Workstation ID: [id number]    CT Head Without Contrast  Result Date: 6/21/2025  Impression: Impression: 1.No acute intracranial abnormality is identified. 2.Sphenoid sinus disease. 3.No acute cervical spine fracture is identified. 4.Minimal grade 1 anterolisthesis of C4 on C5. 5.Multilevel mild to moderate degenerative changes. Electronically Signed: Rebecca Zamora MD  6/21/2025 4:56 PM EDT  Workstation ID: [id number]    CT Cervical Spine Without Contrast  Result Date: 6/21/2025  Impression: Impression: 1.No acute intracranial abnormality is identified. 2.Sphenoid sinus disease. 3.No acute cervical spine fracture is identified. 4.Minimal grade 1 anterolisthesis of C4 on C5. 5.Multilevel mild to moderate degenerative changes. Electronically Signed: Rebecca Zamora MD  6/21/2025 4:56 PM EDT  Workstation ID: [id number]              Results for orders placed during the hospital encounter of 12/14/22    Adult Transthoracic Echo Complete W/ Cont if Necessary Per Protocol 12/14/2022  5:24 PM    Interpretation Summary    Left ventricular systolic function is normal. Calculated left ventricular EF = 55% Estimated left ventricular EF = 55%    Left ventricular diastolic function is consistent with (grade I) impaired relaxation.    Estimated right ventricular systolic pressure from tricuspid regurgitation is normal (<35 mmHg).      Labs Pending at Discharge:  Pending Results       None            Procedures Performed           Consults:   Consults       Date and Time Order Name Status Description    7/14/2025  5:46 PM Neurosurgery (on-call MD unless specified) Completed               Discharge Details        Discharge Medications        New Medications        Instructions Start Date   amoxicillin-clavulanate 875-125 MG per tablet  Commonly  known as: AUGMENTIN   1 tablet, Oral, Every 12 Hours Scheduled             Changes to Medications        Instructions Start Date   HYDROcodone-acetaminophen 5-325 MG per tablet  Commonly known as: NORCO  What changed: reasons to take this   1 tablet, Oral, Every 6 Hours PRN             Continue These Medications        Instructions Start Date   acetaminophen 325 MG tablet  Commonly known as: TYLENOL   650 mg, Every 4 Hours PRN      ALPRAZolam 1 MG tablet  Commonly known as: XANAX   1 mg, Every 6 Hours PRN      amLODIPine 5 MG tablet  Commonly known as: NORVASC   10 mg, Oral, Every 24 Hours Scheduled      hydrALAZINE 25 MG tablet  Commonly known as: APRESOLINE   25 mg, 3 Times Daily      ondansetron ODT 4 MG disintegrating tablet  Commonly known as: ZOFRAN-ODT   4 mg, Every 6 Hours PRN               Allergies   Allergen Reactions    Topamax [Topiramate] Hives and Other (See Comments)     CAUSED KIDNEY STONES    Codeine Hives and Itching         Discharge Disposition:   Skilled Nursing Facility ([place] - External)    Diet: Regular diet          Discharge Activity:   Activity Instructions       Activity as Tolerated     Additional Activity Instructions:    Limit lifting, pushing, pulling of no more than 5 pounds, with no no bending or twisting. No exertional impact activity-walking is okay. TLSO brace when out of bed. Do not have to wear the brace in bed.              CODE STATUS:  Code Status and Medical Interventions: CPR (Attempt to Resuscitate); Full Support   Ordered at: 07/14/25 1907     Code Status (Patient has no pulse and is not breathing):    CPR (Attempt to Resuscitate)     Medical Interventions (Patient has pulse or is breathing):    Full Support         Future Appointments   Date Time Provider Department Center   8/27/2025 11:15 AM Hannah Gonzales APRN [facility]       Additional Instructions for the Follow-ups that You Need to Schedule       Discharge Follow-up with PCP   As directed       Currently  Documented PCP:    Mina Padilla PA    PCP Phone Number:    [phone]     Follow Up Details: 1 week        Discharge Follow-up with Specified Provider: Neurosurgery; 2 Weeks   As directed      To: Neurosurgery   Follow Up: 2 Weeks        XR Spine Thoracolumbar 2 View   Aug 27, 2025      Exam reason: f/u T12 fracture   Release to patient: Routine Release                Time spent on Discharge including face to face service:  >30 minutes    Signature: Electronically signed by Radha Altman MD, 07/17/25, 16:39 EDT.  [facility] Hospitalist Team    same name as above

## 2025-07-17 NOTE — CASE MANAGEMENT/SOCIAL WORK
Case Management Discharge Note      Final Note: [facility] [place]         Selected Continued Care - Discharged on 7/17/2025 Admission date: 7/14/2025 - Discharge disposition: Skilled Nursing Facility ([place] - External)      Destination Coordination complete.      Service Provider Services Address Phone Fax Patient Preferred    [facility] [address] [place] [address] [phone] --              Durable Medical Equipment    No services have been selected for the patient.                Dialysis/Infusion    No services have been selected for the patient.                Home Medical Care    No services have been selected for the patient.                Therapy    No services have been selected for the patient.                Community Resources    No services have been selected for the patient.                Community & DME    No services have been selected for the patient.                    Selected Continued Care - Prior Encounters Includes continued care and service providers with selected services from prior encounters from 4/15/2025 to 7/17/2025      Discharged on 6/24/2025 Admission date: 6/21/2025 - Discharge disposition: Rehab Facility or Unit ([place] - External)      Destination       Service Provider Services Address Phone Fax Patient Preferred    [facility] [address] [place] [address] [phone] --                          Transportation Services  Transportation: Private Transportation  Private: Car    Final Discharge Disposition Code: 62 - inpatient rehab facility

## 2025-07-17 NOTE — CASE MANAGEMENT/SOCIAL WORK
Continued Stay Note   Hi     Patient Name: Yaneli Hernandez  MRN: [id number]  Today's Date: 7/17/2025    Admit Date: 7/14/2025    Plan: [facility], accepted. No PASRR or precert required.   Discharge Plan       Row Name 07/17/25 0923       Plan    Plan Comments DC orders noted, patient has her own DC transport to [facility]. CM made liaisons aware patient has a DC order and transport to facility. No further needs at this time.           Kevin Abebe RN     Cell number [phone]  Office number [phone]

## 2025-07-17 NOTE — PLAN OF CARE
Goal Outcome Evaluation:  Plan of Care Reviewed With: patient        Progress: no change  Outcome Evaluation: Pt VSS, Pain treated per MAR, Pt is able to make needs known, TLSO brace out of bed, Call light in reach, Plan on going

## 2025-07-17 NOTE — PLAN OF CARE
Goal Outcome Evaluation:      Pt is able to make needs known. Pt has no reports of pain. Plan to discharge to [facility] today, sister is transporting. Call light within reach. Pt has no complaints at this time